# Patient Record
Sex: FEMALE | Race: WHITE | NOT HISPANIC OR LATINO | URBAN - METROPOLITAN AREA
[De-identification: names, ages, dates, MRNs, and addresses within clinical notes are randomized per-mention and may not be internally consistent; named-entity substitution may affect disease eponyms.]

---

## 2023-03-16 ENCOUNTER — INPATIENT (INPATIENT)
Facility: HOSPITAL | Age: 48
LOS: 5 days | Discharge: HOME | DRG: 315 | End: 2023-03-22
Attending: INTERNAL MEDICINE | Admitting: INTERNAL MEDICINE
Payer: COMMERCIAL

## 2023-03-16 ENCOUNTER — APPOINTMENT (OUTPATIENT)
Dept: HEART AND VASCULAR | Facility: CLINIC | Age: 48
End: 2023-03-16
Payer: COMMERCIAL

## 2023-03-16 ENCOUNTER — NON-APPOINTMENT (OUTPATIENT)
Age: 48
End: 2023-03-16

## 2023-03-16 VITALS
OXYGEN SATURATION: 96 % | DIASTOLIC BLOOD PRESSURE: 71 MMHG | TEMPERATURE: 99 F | HEIGHT: 62 IN | WEIGHT: 270.07 LBS | RESPIRATION RATE: 18 BRPM | HEART RATE: 68 BPM | SYSTOLIC BLOOD PRESSURE: 125 MMHG

## 2023-03-16 VITALS
OXYGEN SATURATION: 99 % | BODY MASS INDEX: 50.12 KG/M2 | HEIGHT: 62 IN | DIASTOLIC BLOOD PRESSURE: 87 MMHG | HEART RATE: 81 BPM | SYSTOLIC BLOOD PRESSURE: 154 MMHG

## 2023-03-16 VITALS — WEIGHT: 274 LBS

## 2023-03-16 DIAGNOSIS — Z98.891 HISTORY OF UTERINE SCAR FROM PREVIOUS SURGERY: Chronic | ICD-10-CM

## 2023-03-16 DIAGNOSIS — Z98.890 OTHER SPECIFIED POSTPROCEDURAL STATES: Chronic | ICD-10-CM

## 2023-03-16 DIAGNOSIS — I44.30 UNSPECIFIED ATRIOVENTRICULAR BLOCK: ICD-10-CM

## 2023-03-16 PROBLEM — Z00.00 ENCOUNTER FOR PREVENTIVE HEALTH EXAMINATION: Status: ACTIVE | Noted: 2023-03-16

## 2023-03-16 LAB
ANION GAP SERPL CALC-SCNC: 13 MMOL/L — SIGNIFICANT CHANGE UP (ref 5–17)
APTT BLD: 30.8 SEC — SIGNIFICANT CHANGE UP (ref 27.5–35.5)
BASOPHILS # BLD AUTO: 0.03 K/UL — SIGNIFICANT CHANGE UP (ref 0–0.2)
BASOPHILS NFR BLD AUTO: 0.3 % — SIGNIFICANT CHANGE UP (ref 0–2)
BUN SERPL-MCNC: 8 MG/DL — SIGNIFICANT CHANGE UP (ref 7–23)
CALCIUM SERPL-MCNC: 9.6 MG/DL — SIGNIFICANT CHANGE UP (ref 8.4–10.5)
CHLORIDE SERPL-SCNC: 98 MMOL/L — SIGNIFICANT CHANGE UP (ref 96–108)
CO2 SERPL-SCNC: 25 MMOL/L — SIGNIFICANT CHANGE UP (ref 22–31)
CREAT SERPL-MCNC: 0.52 MG/DL — SIGNIFICANT CHANGE UP (ref 0.5–1.3)
CRP SERPL-MCNC: 34.5 MG/L — HIGH (ref 0–4)
EGFR: 115 ML/MIN/1.73M2 — SIGNIFICANT CHANGE UP
EOSINOPHIL # BLD AUTO: 0.04 K/UL — SIGNIFICANT CHANGE UP (ref 0–0.5)
EOSINOPHIL NFR BLD AUTO: 0.3 % — SIGNIFICANT CHANGE UP (ref 0–6)
ERYTHROCYTE [SEDIMENTATION RATE] IN BLOOD: 48 MM/HR — HIGH
GLUCOSE SERPL-MCNC: 234 MG/DL — HIGH (ref 70–99)
HCG SERPL-ACNC: <0 MIU/ML — SIGNIFICANT CHANGE UP
HCT VFR BLD CALC: 38.2 % — SIGNIFICANT CHANGE UP (ref 34.5–45)
HGB BLD-MCNC: 12.4 G/DL — SIGNIFICANT CHANGE UP (ref 11.5–15.5)
IMM GRANULOCYTES NFR BLD AUTO: 0.3 % — SIGNIFICANT CHANGE UP (ref 0–0.9)
INR BLD: 1.12 — SIGNIFICANT CHANGE UP (ref 0.88–1.16)
LYMPHOCYTES # BLD AUTO: 2.38 K/UL — SIGNIFICANT CHANGE UP (ref 1–3.3)
LYMPHOCYTES # BLD AUTO: 20.5 % — SIGNIFICANT CHANGE UP (ref 13–44)
MAGNESIUM SERPL-MCNC: 1.8 MG/DL — SIGNIFICANT CHANGE UP (ref 1.6–2.6)
MCHC RBC-ENTMCNC: 29.9 PG — SIGNIFICANT CHANGE UP (ref 27–34)
MCHC RBC-ENTMCNC: 32.5 GM/DL — SIGNIFICANT CHANGE UP (ref 32–36)
MCV RBC AUTO: 92 FL — SIGNIFICANT CHANGE UP (ref 80–100)
MONOCYTES # BLD AUTO: 0.62 K/UL — SIGNIFICANT CHANGE UP (ref 0–0.9)
MONOCYTES NFR BLD AUTO: 5.3 % — SIGNIFICANT CHANGE UP (ref 2–14)
NEUTROPHILS # BLD AUTO: 8.49 K/UL — HIGH (ref 1.8–7.4)
NEUTROPHILS NFR BLD AUTO: 73.3 % — SIGNIFICANT CHANGE UP (ref 43–77)
NRBC # BLD: 0 /100 WBCS — SIGNIFICANT CHANGE UP (ref 0–0)
NT-PROBNP SERPL-SCNC: 2421 PG/ML — HIGH (ref 0–300)
PLATELET # BLD AUTO: 400 K/UL — SIGNIFICANT CHANGE UP (ref 150–400)
POTASSIUM SERPL-MCNC: 4.1 MMOL/L — SIGNIFICANT CHANGE UP (ref 3.5–5.3)
POTASSIUM SERPL-SCNC: 4.1 MMOL/L — SIGNIFICANT CHANGE UP (ref 3.5–5.3)
PROTHROM AB SERPL-ACNC: 13.3 SEC — SIGNIFICANT CHANGE UP (ref 10.5–13.4)
RBC # BLD: 4.15 M/UL — SIGNIFICANT CHANGE UP (ref 3.8–5.2)
RBC # FLD: 16.4 % — HIGH (ref 10.3–14.5)
SARS-COV-2 RNA SPEC QL NAA+PROBE: NEGATIVE — SIGNIFICANT CHANGE UP
SODIUM SERPL-SCNC: 136 MMOL/L — SIGNIFICANT CHANGE UP (ref 135–145)
TROPONIN T SERPL-MCNC: 0.01 NG/ML — SIGNIFICANT CHANGE UP (ref 0–0.01)
TSH SERPL-MCNC: 7.15 UIU/ML — HIGH (ref 0.27–4.2)
WBC # BLD: 11.6 K/UL — HIGH (ref 3.8–10.5)
WBC # FLD AUTO: 11.6 K/UL — HIGH (ref 3.8–10.5)

## 2023-03-16 PROCEDURE — 71045 X-RAY EXAM CHEST 1 VIEW: CPT | Mod: 26

## 2023-03-16 PROCEDURE — 99212 OFFICE O/P EST SF 10 MIN: CPT | Mod: 25

## 2023-03-16 PROCEDURE — 93000 ELECTROCARDIOGRAM COMPLETE: CPT

## 2023-03-16 PROCEDURE — 99291 CRITICAL CARE FIRST HOUR: CPT

## 2023-03-16 RX ORDER — FUROSEMIDE 40 MG
20 TABLET ORAL ONCE
Refills: 0 | Status: COMPLETED | OUTPATIENT
Start: 2023-03-16 | End: 2023-03-16

## 2023-03-16 RX ORDER — LOSARTAN POTASSIUM 100 MG/1
25 TABLET, FILM COATED ORAL ONCE
Refills: 0 | Status: DISCONTINUED | OUTPATIENT
Start: 2023-03-16 | End: 2023-03-17

## 2023-03-16 RX ORDER — MAGNESIUM OXIDE 400 MG ORAL TABLET 241.3 MG
400 TABLET ORAL ONCE
Refills: 0 | Status: COMPLETED | OUTPATIENT
Start: 2023-03-16 | End: 2023-03-16

## 2023-03-16 RX ORDER — LOSARTAN POTASSIUM 100 MG/1
25 TABLET, FILM COATED ORAL EVERY 24 HOURS
Refills: 0 | Status: DISCONTINUED | OUTPATIENT
Start: 2023-03-17 | End: 2023-03-17

## 2023-03-16 RX ORDER — INSULIN LISPRO 100/ML
VIAL (ML) SUBCUTANEOUS
Refills: 0 | Status: DISCONTINUED | OUTPATIENT
Start: 2023-03-16 | End: 2023-03-22

## 2023-03-16 RX ADMIN — MAGNESIUM OXIDE 400 MG ORAL TABLET 400 MILLIGRAM(S): 241.3 TABLET ORAL at 19:00

## 2023-03-16 RX ADMIN — Medication 20 MILLIGRAM(S): at 22:12

## 2023-03-16 NOTE — H&P ADULT - HISTORY OF PRESENT ILLNESS
46 y/o F, with no significant PMH, who presented to [facility] ED on 3/16/23 from Dr. Rushing's office who did an EKG that shows a high degree AV block and LBBB. Patient states that she was in her usual state of health until about 2 weeks ago, where she had difficulty breathing with ambulation. Patient also w/ subjective fever, weakness, and light cough; however have all resolved.  Patient went to urgent care 1 night ago and had an EKG that showed high degree AV block, which prompted her to see her cardiologist today. Patient feels well overall. Patient denies CP, dizziness, headache, lightheadedness, syncope, fever, chills, malaise, or other symptoms.     In the ED, VS: T: 99F, BP: 125/71 mmHg, HR: 68 bpm, spO2: 96% on RA  Labs significant: WBC: 11.60, BNP: 2421, COVID-19: Negative; CXR: High degree AVB, LBBB @    CXR: Cardiomegaly. Question mild pulmonary edema. Hazy opacification of the peripheral lung bases, most likely due to overlying soft tissue, although   early consolidation cannot be excluded. EKG: High grade AV block, LBBB, rates in 66 bpm   Patient admitted to cardiology/telemetry for further workup of high-grade AV block.

## 2023-03-16 NOTE — H&P ADULT - ASSESSMENT
48 y/o F, with no significant PMH, who presented to [facility] ED on 3/16/23 from Dr. Rushing's office who did an EKG that shows a high degree AV block and LBBB. Patient states that she was in her usual state of health until about 2 weeks ago, where she had difficulty breathing with ambulation. Patient also w/ subjective fever, weakness, and light cough; however have all resolved.  in the ED, patient w/ LBBB and high gradeAV block on EKG. Seen by EP who recommended cards admission for cardiac MRI/echo and further monitoring.

## 2023-03-16 NOTE — PHYSICAL EXAM
[Normal] : alert and oriented, normal memory [de-identified] : Heavyset [de-identified] : tachy w extra systoles

## 2023-03-16 NOTE — PROGRESS NOTE ADULT - SUBJECTIVE AND OBJECTIVE BOX
*TRANSFER from [facility] to [facility] 5E*  48 y/o F, with no significant PMH, who presented to [facility] ED on 3/16/23 from Dr. Rushing's office who did an EKG that shows a high degree AV block and LBBB. Patient states that she was in her usual state of health until about 2 weeks ago, where she had difficulty breathing with ambulation. Patient also w/ subjective fever, weakness, and light cough; however have all resolved.  Patient went to urgent care 1 night ago and had an EKG that showed high degree AV block, which prompted her to see her cardiologist the day of presentation . Patient feels well overall. Patient denies CP, dizziness, headache, lightheadedness, syncope, fever, chills, malaise, or other symptoms. The patient is admitted to CCU for monitoring of AV block w implementation of pacer pads with continuation of conservative treatment.     ALISHA MIDDLETON 47y Female  MRN#: [id number]     SUBJECTIVE  Currently admitted to medicine with the primary diagnosis of symptomatic AV heart block  Today the patient continues to feel well overall. They deny any CP, headache, lightheadedness, dizziness, syncope, or subjective fever. She reports improved SOB.      OBJECTIVE  PAST MEDICAL & SURGICAL HISTORY  S/P     S/P hernia repair    Home Meds:    ALLERGIES:  No Known Allergies    MEDICATIONS:  STANDING MEDICATIONS    PRN MEDICATIONS    VITAL SIGNS: Last 24 Hours  T(C): 37.1 (16 Mar 2023 18:15), Max: 37.2 (16 Mar 2023 12:40)  T(F): 98.8 (16 Mar 2023 18:15), Max: 99 (16 Mar 2023 12:40)  HR: 56 (16 Mar 2023 19:58) (56 - 79)  BP: 162/74 (16 Mar 2023 19:58) (107/73 - 162/74)  BP(mean): 106 (16 Mar 2023 19:58) (89 - 106)  RR: 22 (16 Mar 2023 19:58) (17 - 22)  SpO2: 97% (16 Mar 2023 19:58) (95% - 98%)    LABS:                        12.4   11.60 )-----------( 400      ( 16 Mar 2023 13:04 )             38.2     03-16    136  |  98  |  8   ----------------------------<  234<H>  4.1   |  25  |  0.52    Ca    9.6      16 Mar 2023 13:04  Mg     1.8     03-16      PT/INR - ( 16 Mar 2023 13:04 )   PT: 13.3 sec;   INR: 1.12          PTT - ( 16 Mar 2023 13:04 )  PTT:30.8 sec      Troponin T, Serum: 0.01 ng/mL (23 @ 13:04)  Sedimentation Rate, Erythrocyte: 48 mm/Hr *H* (23 @ 13:04)      CARDIAC MARKERS ( 16 Mar 2023 13:04 )  x     / 0.01 ng/mL / x     / x     / x          PHYSICAL EXAM:  General: NAD, AAOx3  HEENT: atraumatic, normocephalic  Pulmonary: + rales in the BL lung fields, No wheeze, no increased WOB on exam.   Cardiovascular: Regular rate and rhythm; no murmurs, rubs or gallops. Normal S1S2  Gastrointestinal: Soft, nontender, nondistended; bowel sounds present  Musculoskeletal: 2+ peripheral pulses, no clubbing, cyanosis or edema  Neurology: Pt. alert and oriented, fluent speech, able to move all extremities  Skin: no rashes or lesions         *TRANSFER from [facility] to [facility] 5E*    The patient is a 48 y/o F, with no significant PMH, who presented to [facility] ED on 3/16/23 from Dr. Rushing's office s/p EKG showing high degree AV block w concurrent LBBB. Patient states that she was in her usual state of health until about 2 weeks ago, where she had difficulty breathing, particularly w ambulation w concurrent subjective fever, weakness, and light cough; however have all resolved upon presentation. On presentation, the patient reported feeling well overall without any CP, dizziness, headache, lightheadedness, syncope, fever, chills, malaise, or other symptoms. In the ED the patient presented with stable vitals with CXR notable for cardiomegaly (BNP >2K) w hazy opacification of the peripheral lung bases. Patient is admitted to CCU s/p implementation of pacer pads with management and workup of high-grade AV block (w LBBB).   ALISHA MIDDLETON 47y Female  MRN#: [id number]     SUBJECTIVE  Currently admitted to medicine with the primary diagnosis of symptomatic AV heart block  Today the patient continues to feel well overall. They deny any CP, headache, lightheadedness, dizziness, syncope, or subjective fever. She reports improved SOB.      OBJECTIVE  PAST MEDICAL & SURGICAL HISTORY  S/P     S/P hernia repair    Home Meds:    ALLERGIES:  No Known Allergies    MEDICATIONS:  STANDING MEDICATIONS    PRN MEDICATIONS    VITAL SIGNS: Last 24 Hours  T(C): 37.1 (16 Mar 2023 18:15), Max: 37.2 (16 Mar 2023 12:40)  T(F): 98.8 (16 Mar 2023 18:15), Max: 99 (16 Mar 2023 12:40)  HR: 56 (16 Mar 2023 19:58) (56 - 79)  BP: 162/74 (16 Mar 2023 19:58) (107/73 - 162/74)  BP(mean): 106 (16 Mar 2023 19:58) (89 - 106)  RR: 22 (16 Mar 2023 19:58) (17 - 22)  SpO2: 97% (16 Mar 2023 19:58) (95% - 98%)    LABS:                        12.4   11.60 )-----------( 400      ( 16 Mar 2023 13:04 )             38.2     03-16    136  |  98  |  8   ----------------------------<  234<H>  4.1   |  25  |  0.52    Ca    9.6      16 Mar 2023 13:04  Mg     1.8           PT/INR - ( 16 Mar 2023 13:04 )   PT: 13.3 sec;   INR: 1.12          PTT - ( 16 Mar 2023 13:04 )  PTT:30.8 sec      Troponin T, Serum: 0.01 ng/mL (23 @ 13:04)  Sedimentation Rate, Erythrocyte: 48 mm/Hr *H* (23 @ 13:04)      CARDIAC MARKERS ( 16 Mar 2023 13:04 )  x     / 0.01 ng/mL / x     / x     / x          PHYSICAL EXAM:  General: NAD, AAOx3  HEENT: atraumatic, normocephalic  Pulmonary: + rales in the BL lung fields, No wheeze, no increased WOB on exam.   Cardiovascular: Regular rate and rhythm; no murmurs, rubs or gallops. Normal S1S2  Gastrointestinal: Soft, nontender, nondistended; bowel sounds present  Musculoskeletal: 2+ peripheral pulses, no clubbing, cyanosis or edema  Neurology: Pt. alert and oriented, fluent speech, able to move all extremities  Skin: no rashes or lesions         *TRANSFER from [facility] to [facility] 5E*    The patient is a 48 y/o F, with no significant PMH, who presented to [facility] ED on 3/16/23 from Dr. Rushing's office s/p EKG showing high degree AV block w concurrent LBBB. Patient states that she was in her usual state of health until about 2 weeks ago, where she had difficulty breathing, particularly w ambulation w concurrent subjective fever, weakness, and light cough; however have all resolved upon presentation. On presentation, the patient reported feeling well overall without any CP, dizziness, headache, lightheadedness, syncope, fever, chills, malaise, or other symptoms. In the ED the patient presented with stable vitals with CXR notable for cardiomegaly (BNP >2K) w hazy opacification of the peripheral lung bases. Patient is admitted to CCU s/p implementation of pacer pads with management and workup of high-grade AV block (w LBBB).     ALISHA MIDDLETON 47y Female  MRN#: [id number]     SUBJECTIVE  Currently admitted to medicine with the primary diagnosis of symptomatic AV heart block  Today the patient continues to feel well overall. They deny any CP, headache, lightheadedness, dizziness, syncope, or subjective fever. She reports improved SOB.      OBJECTIVE  PAST MEDICAL & SURGICAL HISTORY  S/P     S/P hernia repair    Home Meds:    ALLERGIES:  No Known Allergies    MEDICATIONS:  STANDING MEDICATIONS    PRN MEDICATIONS    VITAL SIGNS: Last 24 Hours  T(C): 37.1 (16 Mar 2023 18:15), Max: 37.2 (16 Mar 2023 12:40)  T(F): 98.8 (16 Mar 2023 18:15), Max: 99 (16 Mar 2023 12:40)  HR: 56 (16 Mar 2023 19:58) (56 - 79)  BP: 162/74 (16 Mar 2023 19:58) (107/73 - 162/74)  BP(mean): 106 (16 Mar 2023 19:58) (89 - 106)  RR: 22 (16 Mar 2023 19:58) (17 - 22)  SpO2: 97% (16 Mar 2023 19:58) (95% - 98%)    LABS:                        12.4   11.60 )-----------( 400      ( 16 Mar 2023 13:04 )             38.2     03-16    136  |  98  |  8   ----------------------------<  234<H>  4.1   |  25  |  0.52    Ca    9.6      16 Mar 2023 13:04  Mg     1.8           PT/INR - ( 16 Mar 2023 13:04 )   PT: 13.3 sec;   INR: 1.12          PTT - ( 16 Mar 2023 13:04 )  PTT:30.8 sec      Troponin T, Serum: 0.01 ng/mL (23 @ 13:04)  Sedimentation Rate, Erythrocyte: 48 mm/Hr *H* (23 @ 13:04)      CARDIAC MARKERS ( 16 Mar 2023 13:04 )  x     / 0.01 ng/mL / x     / x     / x          PHYSICAL EXAM:  General: NAD, AAOx3  HEENT: atraumatic, normocephalic  Pulmonary: + rales in the BL lung fields, No wheeze, no increased WOB on exam.   Cardiovascular: Regular rate and rhythm; no murmurs, rubs or gallops. Normal S1S2  Gastrointestinal: Soft, nontender, nondistended; bowel sounds present  Musculoskeletal: 2+ peripheral pulses, no clubbing, cyanosis or edema  Neurology: Pt. alert and oriented, fluent speech, able to move all extremities  Skin: no rashes or lesions         *TRANSFER from [facility] to [facility] 5E*    The patient is a 48 y/o F, with no significant PMH, who presented to [facility] ED on 3/16/23 from Dr. Rushing's office s/p EKG showing high degree AV block w concurrent LBBB. Patient states that she was in her usual state of health until about 2 weeks ago, where she had difficulty breathing, particularly w ambulation w concurrent subjective fever, weakness, and light cough; however have all resolved upon presentation. On presentation, the patient reported feeling well overall without any CP, dizziness, headache, lightheadedness, syncope, fever, chills, malaise, or other symptoms. In the ED the patient presented with stable vitals with CXR notable for cardiomegaly (BNP >2K) w hazy opacification of the peripheral lung bases. Patient is admitted to CCU s/p implementation of pacer pads with management and workup of high-grade AV block (w LBBB).     ALISHA MIDDLETON 47y Female  MRN#: [id number]     SUBJECTIVE  Currently admitted to medicine with the primary diagnosis of symptomatic AV heart block  Today the patient continues to feel well overall. They deny any CP, headache, lightheadedness, dizziness, syncope, or subjective fever. She reports improved SOB.      OBJECTIVE  PAST MEDICAL & SURGICAL HISTORY  S/P     S/P hernia repair    Home Meds:    ALLERGIES:  No Known Allergies    MEDICATIONS:  STANDING MEDICATIONS    PRN MEDICATIONS    VITAL SIGNS: Last 24 Hours  T(C): 37.1 (16 Mar 2023 18:15), Max: 37.2 (16 Mar 2023 12:40)  T(F): 98.8 (16 Mar 2023 18:15), Max: 99 (16 Mar 2023 12:40)  HR: 56 (16 Mar 2023 19:58) (56 - 79)  BP: 162/74 (16 Mar 2023 19:58) (107/73 - 162/74)  BP(mean): 106 (16 Mar 2023 19:58) (89 - 106)  RR: 22 (16 Mar 2023 19:58) (17 - 22)  SpO2: 97% (16 Mar 2023 19:58) (95% - 98%)    LABS:                        12.4   11.60 )-----------( 400      ( 16 Mar 2023 13:04 )             38.2     03-16    136  |  98  |  8   ----------------------------<  234<H>  4.1   |  25  |  0.52    Ca    9.6      16 Mar 2023 13:04  Mg     1.8           PT/INR - ( 16 Mar 2023 13:04 )   PT: 13.3 sec;   INR: 1.12          PTT - ( 16 Mar 2023 13:04 )  PTT:30.8 sec      Troponin T, Serum: 0.01 ng/mL (23 @ 13:04)  Sedimentation Rate, Erythrocyte: 48 mm/Hr *H* (23 @ 13:04)      CARDIAC MARKERS ( 16 Mar 2023 13:04 )  x     / 0.01 ng/mL / x     / x     / x          PHYSICAL EXAM:  General: NAD, AAOx3  HEENT: atraumatic, normocephalic  Pulmonary: + rales in the BL lung fields, No wheeze, no increased WOB on exam.   Cardiovascular: Regular rate and rhythm; no murmurs, rubs or gallops. Normal S1S2  Gastrointestinal: Soft, nontender, nondistended; bowel sounds present  Musculoskeletal: 2+ peripheral pulses, no clubbing, +1 pitting edema B/L LE   Neurology: Pt. alert and oriented, fluent speech, able to move all extremities  Skin: no rashes or lesions         *TRANSFER from [facility] to [facility] 5E*  Hospital Course  The patient is a 48 y/o F, with no significant PMH, who presented to [facility] ED on 3/16/23 from Dr. Rushing's office s/p EKG showing high degree AV block w concurrent LBBB. Patient states that she was in her usual state of health until about 2 weeks ago, where she had difficulty breathing, particularly w ambulation w concurrent subjective fever, generalized weakness, and a light cough (w/o sputum production); however have all resolved upon presentation. On presentation, the patient reported feeling well overall without any CP, dizziness, headache, lightheadedness, syncope, fever, chills, malaise, or other symptoms. In the ED the patient presented with stable vitals with CXR notable for cardiomegaly (BNP >2K) w hazy opacification of the peripheral lung bases. Patient is admitted to CCU s/p implementation of pacer pads with management and workup of high-grade AV block (w LBBB).     ALISHA MIDDLETON 47y Female  MRN#: [id number]     SUBJECTIVE  Currently admitted to medicine with the primary diagnosis of symptomatic AV heart block  Today the patient continues to feel well overall. They deny any CP, headache, lightheadedness, dizziness, syncope, or subjective fever. She reports improved SOB.      OBJECTIVE  PAST MEDICAL & SURGICAL HISTORY  S/P     S/P hernia repair    Home Meds:    ALLERGIES:  No Known Allergies    MEDICATIONS:  STANDING MEDICATIONS    PRN MEDICATIONS    VITAL SIGNS: Last 24 Hours  T(C): 37.1 (16 Mar 2023 18:15), Max: 37.2 (16 Mar 2023 12:40)  T(F): 98.8 (16 Mar 2023 18:15), Max: 99 (16 Mar 2023 12:40)  HR: 56 (16 Mar 2023 19:58) (56 - 79)  BP: 162/74 (16 Mar 2023 19:58) (107/73 - 162/74)  BP(mean): 106 (16 Mar 2023 19:58) (89 - 106)  RR: 22 (16 Mar 2023 19:58) (17 - 22)  SpO2: 97% (16 Mar 2023 19:58) (95% - 98%)    LABS:                        12.4   11.60 )-----------( 400      ( 16 Mar 2023 13:04 )             38.2     03-    136  |  98  |  8   ----------------------------<  234<H>  4.1   |  25  |  0.52    Ca    9.6      16 Mar 2023 13:04  Mg     1.8     03-16      PT/INR - ( 16 Mar 2023 13:04 )   PT: 13.3 sec;   INR: 1.12          PTT - ( 16 Mar 2023 13:04 )  PTT:30.8 sec      Troponin T, Serum: 0.01 ng/mL (23 @ 13:04)  Sedimentation Rate, Erythrocyte: 48 mm/Hr *H* (23 @ 13:04)      CARDIAC MARKERS ( 16 Mar 2023 13:04 )  x     / 0.01 ng/mL / x     / x     / x          PHYSICAL EXAM:  General: NAD, AAOx3  HEENT: atraumatic, normocephalic  Pulmonary: + rales in the BL lung fields, No wheeze, no increased WOB on exam.   Cardiovascular: Regular rate and rhythm; no murmurs, rubs or gallops. Normal S1S2  Gastrointestinal: Soft, nontender, nondistended; bowel sounds present  Musculoskeletal: 2+ peripheral pulses, no clubbing, +1 pitting edema B/L LE   Neurology: Pt. alert and oriented, fluent speech, able to move all extremities  Skin: no rashes or lesions         *TRANSFER from [facility] to [facility] 5E*  Hospital Course  The patient is a 46 y/o F, with no significant PMH, who presented to [facility] ED on 3/16/23 from Dr. Rushing's office s/p EKG showing high degree AV block w concurrent LBBB. Patient states that she was in her usual state of health until about 2 weeks ago, where she had difficulty breathing, particularly w ambulation w concurrent subjective fever, generalized weakness, and a light cough (w/o sputum production); however have all resolved upon presentation. On presentation, the patient reported feeling well overall without any CP, dizziness, headache, lightheadedness, syncope, fever, chills, malaise, or other symptoms. In the ED the patient presented with stable vitals with CXR notable for cardiomegaly (BNP >2K) w hazy opacification of the peripheral lung bases. Patient is admitted to CCU s/p implementation of pacer pads with management and workup of high-grade AV block (w LBBB).     In the ED, VS: T: 99F, BP: 125/71 mmHg, HR: 68 bpm, spO2: 96% on RA  Labs significant: WBC: 11.60, BNP: 2421, COVID-19: Negative; CXR: High degree AVB, LBBB @    CXR: Cardiomegaly. Question mild pulmonary edema. Hazy opacification of the peripheral lung bases, most likely due to overlying soft tissue, although   early consolidation cannot be excluded. EKG: High grade AV block, LBBB, rates in 66 bpm   Patient admitted to cardiology/telemetry for further workup of high-grade AV block.     ALISHA MIDDLETON 47y Female  MRN#: [id number]     SUBJECTIVE  Currently admitted to medicine with the primary diagnosis of symptomatic AV heart block  Today the patient continues to feel well overall. They deny any CP, headache, lightheadedness, dizziness, syncope, or subjective fever. She reports improved SOB.      OBJECTIVE  PAST MEDICAL & SURGICAL HISTORY  S/P     S/P hernia repair    Home Meds:    ALLERGIES:  No Known Allergies    MEDICATIONS:  STANDING MEDICATIONS    PRN MEDICATIONS    VITAL SIGNS: Last 24 Hours  T(C): 37.1 (16 Mar 2023 18:15), Max: 37.2 (16 Mar 2023 12:40)  T(F): 98.8 (16 Mar 2023 18:15), Max: 99 (16 Mar 2023 12:40)  HR: 56 (16 Mar 2023 19:58) (56 - 79)  BP: 162/74 (16 Mar 2023 19:58) (107/73 - 162/74)  BP(mean): 106 (16 Mar 2023 19:58) (89 - 106)  RR: 22 (16 Mar 2023 19:58) (17 - 22)  SpO2: 97% (16 Mar 2023 19:58) (95% - 98%)    LABS:                        12.4   11.60 )-----------( 400      ( 16 Mar 2023 13:04 )             38.2     03-16    136  |  98  |  8   ----------------------------<  234<H>  4.1   |  25  |  0.52    Ca    9.6      16 Mar 2023 13:04  Mg     1.8     03-16      PT/INR - ( 16 Mar 2023 13:04 )   PT: 13.3 sec;   INR: 1.12          PTT - ( 16 Mar 2023 13:04 )  PTT:30.8 sec      Troponin T, Serum: 0.01 ng/mL (23 @ 13:04)  Sedimentation Rate, Erythrocyte: 48 mm/Hr *H* (23 @ 13:04)      CARDIAC MARKERS ( 16 Mar 2023 13:04 )  x     / 0.01 ng/mL / x     / x     / x          PHYSICAL EXAM:  General: NAD, AAOx3  HEENT: atraumatic, normocephalic  Pulmonary: + rales in the BL lung fields, No wheeze, no increased WOB on exam.   Cardiovascular: Regular rate and rhythm; no murmurs, rubs or gallops. Normal S1S2  Gastrointestinal: Soft, nontender, nondistended; bowel sounds present  Musculoskeletal: 2+ peripheral pulses, no clubbing, +1 pitting edema B/L LE   Neurology: Pt. alert and oriented, fluent speech, able to move all extremities  Skin: no rashes or lesions

## 2023-03-16 NOTE — PATIENT PROFILE ADULT - FALL HARM RISK - HARM RISK INTERVENTIONS

## 2023-03-16 NOTE — ASSESSMENT
[FreeTextEntry1] : EKG 3/15/2022 (outside, canned into [employer]): NSR, high degree AV block, wide LBBB (QRS approx 170 msec)\par EKG 3/16, atrial tachycardia, PVCs, LBBB\par \par A.P\par 1. Abnormal EKG\par 2. LBBB\par 3. High degree AV block\par Hx as above\par Fairly acute symptom onset, with objective and evolving EKG changes as above w transient high degree AV block. On exam now, hemodynamically stable, euvolemic.\par Plan\par - refer  ED (ED notified)\par - EP consult (Dr Marie aware)\par - echo in ED\par Further w/u pending echo findings

## 2023-03-16 NOTE — H&P ADULT - ATTENDING COMMENTS
46 yo woman  with no significant PMH or issues during previous pregnancies. Presented with a 2wk URI viral prodrome after contact with sick children followed by insidious onset of PEREIRA, orthopnea and LE edema. Came to see a cardiologist in the office, found to be bradycardic.    ECG showed high-degree AVB  CXR showed mild pulmonary edema   Labs with no major electrolyte abnormalities and no anemia - pBNP~2k, CRP~34   TTE shows global LV dysfunction with LVEF~30%, normal cavity size and normal RV  Covid swab negative     No VT and hemodynamically stable.     Suspect acute myocarditis with high risk features: acute heart failure and high degree AVB. Suspect viral/lymphocytic based on history but cannot exclude GCM.    - Offered EMB after long discussion with her and  - they declined. Reinforced that if she worsens we would like to do it  - Lyme titers (low likelihood)  - Improved substantially with one dose of Lasix, now stable   - Expectant management for now - If she does not worsen will slowly introduce GDMT for HFrEF    Ben Cronin MD

## 2023-03-16 NOTE — H&P ADULT - CONSTITUTIONAL COMMENTS
Spoke with Dr Je Wu to reorder inhaler as ordered previously. Pt needs b12 and folate checked. Bactrim daily as pervious.
NAD, sitting comfortably in bed

## 2023-03-16 NOTE — H&P ADULT - PROBLEM SELECTOR PLAN 1
EKG w/ high grade AV block and LBBB (new)  - States hx of viral-like symptoms - cough, fever, PEREIRA 2 weeks ago   - Troponin: Negative x 1  - CXR w/ questionable cardiopulmonary edema   - ECHO ordered  - Cardiac MRI ordered/ ? if patient has reaction from contrast (give IV solumedrol prior to MRI)  - Ordered ESR/CRP; lyme titers; thyroid studies, lipid, a1c  - EP to follow patient   - Pacer pads on, atropine at bedside, telemetry monitoring     F: none   E: K >4, MG > 2  N: DASH, TLC  Dvt: ambulatory  Dispo: pending EP workup

## 2023-03-16 NOTE — CONSULT NOTE ADULT - ASSESSMENT
46 yo F with no significant past medical history who was referred to [facility] Ed for evaluation of high degree heart block noted on her  ECG and symptoms of SOB, PEREIRA , LE swelling and weight gain.   Patients ECGs show intermitted high degree AVB, LBBB, and Wenckebach on ecg 3/16/23.   Will follow up echo cardiogram, please get cMRI to r/o any infiltrative heart diseases, would also send Lyme titers .  Cardiac telemetry with pacing pads . Will follow

## 2023-03-16 NOTE — HISTORY OF PRESENT ILLNESS
[FreeTextEntry1] : 47 F\par No signif cardiac or medical history - HTN, -DM, - lipids, - tob\par Remote post gestation TFT abnormality resolved without treatment\par Prior routine EKG 3 years ago, definitively normal per pt\par \par In [facility], active, two weeks ago noted PEREIRA during usual walk [place] to [place], accompanied by LE edema and weight gain. No CP. Symptoms on and off over last two weeks but steadily progressing. Saw Urgent Care last night, EKG w high degree AV block. Overnight spontaneous improvement in exertional symptoms w accompanying spontaneous diuresis.\par Denies CP, palpitations, dizziness, lightheadedness or syncope. No constitutional symptoms. travel to [place] 2 mos ago. No vitamins/ health food supplements\par \par No family history cardiac disease

## 2023-03-16 NOTE — ED PROVIDER NOTE - PHYSICAL EXAMINATION
General: Well appearing, in no acute distress  HEENT: Normocephalic, atraumatic, extraocular movements intact  CV: Regular rate  Pulm: No respiratory distress, no tachypnea, CTAB  Abd: Flat, no gross distension  Ext: warm and well perfused, no LE edema  Skin: No gross rashes or lesions  Neuro: Alert and oriented, moving all extremities general: Well appearing, in no acute distress

## 2023-03-16 NOTE — CHART NOTE - NSCHARTNOTEFT_GEN_A_CORE
Limited TTE:    Largely limited by poor body habitus, technically difficult study  Probably normal LV size and function  No significant valvular disease    Official TTE with contrast in the AM  Kyle Marshall PGY4 Cardiology Limited TTE:    Largely limited by poor body habitus, technically difficult study  LV size likely normal but mild to moderate reduced function (EF 35-40%)  No significant valvular disease    Official TTE with contrast in the AM  Kyle Marshall PGY4 Cardiology Limited TTE:    Largely limited by poor body habitus, technically difficult study  LV size likely normal but mild to moderate reduced function (EF ~40%)  No significant valvular disease    Official TTE with contrast in the AM  Kyle Marshall PGY4 Cardiology

## 2023-03-16 NOTE — PROGRESS NOTE ADULT - ASSESSMENT
48 y/o F, with no significant PMH, who presented to [facility] ED on 3/16/23 from Dr. Rushing's office w EKG high degree AV block and LBBB w 2 week hx of SOB and increased WOB w ambulation. In addition had accompanying subjective fever, weakness, and light cough; however have all resolved. Patient found to have LBBB and high grade AV block on EKG w current admission for monitoring of AV block with further assessment via cardiac MRI/echo.       48 y/o F, with no significant PMH, who presented to [facility] ED on 3/16/23 from Dr. Rushing's office w EKG high degree AV block and LBBB w 2 week hx of SOB and increased WOB w ambulation. In addition had accompanying subjective fever, weakness, and light cough; however have all resolved. Patient found to have LBBB and high grade AV block on EKG w current admission for monitoring of AV block with further assessment via cardiac MRI/echo.      NEURO    PULM    CARDIOVASCULAR    ENDO    GI        RENAL    HEME    ID      MISC  F:   E: Replete PRN   N:   DVT ppx:   GI ppx:   Dispo: CCU    LINES  A-line (3/13)  TLC (3/13)  Cordova (3/13)     Extubated 3/15*     46 y/o F, with no significant PMH, who presented to [facility] ED on 3/16/23 from Dr. Rushing's office w EKG high degree AV block and LBBB w 2 week hx of SOB and increased WOB w ambulation. In addition had accompanying subjective fever, weakness, and light cough; however have all resolved. Patient found to have LBBB and high grade AV block on EKG w current admission for monitoring of AV block with further assessment via cardiac MRI/echo.    NEURO  TRA     PULM  on RA   - TRA    CARDIOVASCULAR  #high grade AV block and LBBB  2/2 likely infectious etiology vs ischemia vs infiltrative (viral myocarditis)   Pt states hx of viral-like symptoms, cough, fever, PEREIRA 2 weeks ago. Troponin: Negative x 1. CXR w/ questionable cardiopulmonary edema   - f/u TTE  - f/u cardiac MRI - note if reaction from contrast (give IV solumedrol prior to MRI)   - f/u ESR/CRP, lyme titers, thyroid studies, lipid, a1c   - EP consulted, f/u recs   - Pacer pads on w atropine at bedside + telemetry     ENDO    GI        RENAL    HEME    ID      MISC  F: None   E: Replete PRN   N: DASH, TLC   DVT ppx: ambulatory   Dispo: CCU    LINES  A-line (3/13)  TLC (3/13)  Cordova (3/13)     Extubated 3/15*     48 y/o F, with no significant PMH, who presented to [facility] ED on 3/16/23 from Dr. Rushing's office w EKG high degree AV block and LBBB w 2 week hx of SOB and increased WOB w ambulation. In addition had accompanying subjective fever, weakness, and light cough; however have all resolved. Patient found to have LBBB and high grade AV block on EKG w current admission for monitoring of AV block with further assessment via cardiac MRI/echo.    NEURO  TRA     PULM  on RA   - TRA    CARDIOVASCULAR  #high grade AV block and LBBB  2/2 likely infectious etiology vs ischemia vs infiltrative (viral myocarditis)   Pt states hx of viral-like symptoms, cough, fever, PEREIRA 2 weeks ago. Troponin: Negative x 1. CXR w/ questionable cardiopulmonary edema   - f/u TTE  - f/u cardiac MRI - note if reaction from contrast (give IV solumedrol prior to MRI)   - f/u ESR/CRP, lyme titers, thyroid studies, lipid, a1c   - EP consulted, f/u recs   - Pacer pads on w atropine at bedside + telemetry     ENDO  No known hx DM, however glucose 234   - AM A1c   - Sliding Scale  - On Dash Diet    GI          RENAL    HEME    ID      MISC  F: None   E: Replete PRN   N: DASH, TLC   DVT ppx: ambulatory   Dispo: CCU    LINES  A-line (3/13)  TLC (3/13)  Cordova (3/13)     Extubated 3/15*     46 y/o F, with no significant PMH, who presented to [facility] ED on 3/16/23 from Dr. Rushing's office w EKG high degree AV block and LBBB w 2 week hx of SOB and increased WOB w ambulation. In addition had accompanying subjective fever, weakness, and light cough; however have all resolved. Patient found to have LBBB and high grade AV block on EKG w current admission for monitoring of AV block with further assessment via cardiac MRI/echo.    NEURO  TRA     PULM  on RA   - TRA    CARDIOVASCULAR  #high grade AV block and LBBB  2/2 likely infectious etiology vs ischemia vs infiltrative (viral myocarditis)   Pt states hx of viral-like symptoms, cough, fever, PEREIRA 2 weeks ago. Troponin: Negative x 1. CXR w/ questionable cardiopulmonary edema   - f/u TTE  - f/u cardiac MRI - note if reaction from contrast (give IV solumedrol prior to MRI)   - f/u ESR/CRP, lyme titers, thyroid studies, lipid, a1c   - EP consulted, f/u recs   - Pacer pads on w atropine at bedside + telemetry     ENDO  No known hx DM, however glucose 234   - AM A1c   - Sliding Scale  - On Dash Diet    GI  TRA      TRA    RENAL  TRA    HEME  TRA    ID  TRA    MISC  F: None   E: Replete PRN   N: DASH, TLC   DVT ppx: ambulatory   Dispo: CCU       46 y/o F, with no significant PMH, who presented to [facility] ED on 3/16/23 from Dr. Rushing's office w EKG high degree AV block and LBBB w 2 week hx of SOB and increased WOB w ambulation. In addition had accompanying subjective fever, weakness, and light cough; however have all resolved. Patient found to have LBBB and high grade AV block on EKG w current admission for monitoring of AV block with further assessment via cardiac MRI/echo.    NEURO  TRA     PULM  on RA   TRA    CARDIOVASCULAR  #high grade AV block and LBBB  2/2 likely infectious etiology vs ischemia vs infiltrative (viral myocarditis)   Pt states hx of viral-like symptoms, cough, fever, PEREIRA 2 weeks ago. Troponin: Negative x 1. CXR w/ questionable cardiopulmonary edema   - f/u TTE  - f/u cardiac MRI - note if reaction from contrast (give IV solumedrol prior to MRI)   - f/u ESR/CRP, lyme titers, thyroid studies, lipid, a1c   - EP consulted, f/u recs   - Pacer pads on w atropine at bedside + telemetry     ENDO  No known hx DM, however glucose 234   - AM A1c   - Sliding Scale  - On Dash Diet    GI  TRA      TRA    RENAL  TRA    HEME  TRA    ID  TRA    MISC  F: None   E: Replete PRN   N: DASH, TLC   DVT ppx: ambulatory   Dispo: CCU       Assessment:    48 y/o F, with no significant PMH, who presented to [facility] ED on 3/16/23 from Dr. Rushing's office w EKG high degree AV block and LBBB w 2 week hx of SOB and increased WOB w ambulation. In addition had accompanying subjective fever, weakness, and light cough; however have all resolved. Patient found to have LBBB and high grade AV block on EKG w current admission for monitoring of AV block with further assessment via cardiac MRI/echo.    NEURO  TRA     PULM  TRA    CARDIOVASCULAR  #High grade AV block and LBBB  2/2 likely infectious etiology vs ischemia vs infiltrative (viral myocarditis)   Bedside TTE was poor quality but showed no effusion and likely slightly reduced EF. EKG w mixed wenckebach w 2:1 AV block. S/p 20 IV lasix w good response. Pacer pads on w atropine at bedside + telemetry.   - Lasix 40 IV in AM 3/17  - c/w losartan 25mg OD (pt refused 1st dose, however hypertensive on monitor 147/17)  - f/u TTE  - f/u cardiac MRI - note if reaction from contrast (give IV solumedrol prior to MRI)   - f/u ESR/CRP, lyme titers, thyroid studies, RVP  - EP consulted, f/u recs     ENDO  No known hx DM, however glucose 234   - AM lipid Panel  - AM A1c   - Sliding Scale    GI  TRA      TRA    RENAL  TRA    HEME  TRA    ID  WBC 11.6, however afebrile, WNL VS, likely reactive   - CTM w daily CBCs     MISC  F: None   E: Replete PRN   N: DASH/TLC   DVT ppx: ambulatory   Dispo: CCU       Assessment:    46 y/o F, with no significant PMH, who presented to [facility] ED on 3/16/23 from Dr. Rushing's office w EKG high degree AV block and LBBB w 2 week hx of SOB and increased WOB w ambulation. In addition had accompanying subjective fever, weakness, and light cough; however have all resolved. Patient found to have LBBB and high grade AV block on EKG w current admission for monitoring of AV block with further assessment via cardiac MRI/echo.    NEURO  TRA     PULM  TRA    CARDIOVASCULAR  # 2:1 AV block w LBBB likely 2/2 infectious etiology given history of viral like symptoms (2 wk hx of cough, fever PEREIRA)  vs ischemia vs infiltrative. Bedside TTE was poor quality but showed no effusion and likely slightly reduced EF. EKG w mixed wenckebach w 2:1 AV block. S/p 20 IV lasix w good response. Pacer pads on w atropine at bedside + telemetry.   - Lasix 40 IV in AM 3/17  - c/w losartan 25mg OD (pt refused 1st dose, however hypertensive on monitor 147/17)  - f/u TTE  - f/u cardiac MRI - note if reaction from contrast (give IV solumedrol prior to MRI) to rule out infiltrative disease   - f/u ESR/CRP, lyme titers, thyroid studies, RVP  - EP consulted, f/u recs     ENDO  No known hx DM, however glucose 234   - AM lipid Panel  - AM A1c   - Sliding Scale    GI  TRA      TRA    RENAL  TRA    HEME  TRA    ID  WBC 11.6, however afebrile, WNL VS, likely reactive   - CTM w daily CBCs     MISC  F: None   E: Replete PRN   N: DASH/TLC   DVT ppx: ambulatory   Dispo: CCU       Assessment:    48 y/o F, with no significant PMH, who presented to [facility] ED on 3/16/23 from Dr. Rushing's office w EKG high degree AV block and LBBB w 2 week hx of SOB and increased WOB w ambulation. In addition had accompanying subjective fever, weakness, and light cough; however have all resolved. Patient found to have LBBB and high grade AV block on EKG w current admission for monitoring of AV block with further assessment via cardiac MRI/echo.    NEURO  TRA     PULM  TRA    CARDIOVASCULAR  # 2:1 AV block w LBBB likely 2/2 infectious etiology given history of viral like symptoms (2 wk hx of cough, fever PEREIRA)  vs ischemia vs infiltrative. Bedside TTE was poor quality but showed no effusion and likely slightly reduced EF. EKG w mixed wenckebach w 2:1 AV block. S/p 20 IV lasix w good response. Pacer pads on w atropine at bedside + telemetry.   - Lasix 40 IV in AM 3/17  - c/w losartan 25mg OD (pt refused 1st dose, however hypertensive on monitor 147/17)  - f/u TTE  - f/u cardiac MRI - note if reaction from contrast (give IV solumedrol prior to MRI) to rule out infiltrative disease   - f/u ESR/CRP, lyme titers, thyroid studies, RVP  - EP consulted, f/u recs     ENDO  No known hx DM, however glucose 234   - AM lipid Panel  - AM A1c   - Sliding Scale    GI  TRA    RENAL  TRA    HEME  TRA    ID  WBC 11.6, however afebrile, WNL VS, likely reactive   - CTM w daily CBCs     MISC  F: None   E: Replete PRN   N: DASH/TLC   DVT ppx: ambulatory   Dispo: CCU       Assessment:    46 y/o F, with no significant PMH, who presented to [facility] ED on 3/16/23 from Dr. Rushing's office w EKG high degree AV block and LBBB w 2 week hx of SOB and increased WOB w ambulation. In addition had accompanying subjective fever, weakness, and light cough; however have all resolved. Patient found to have LBBB and high grade AV block on EKG w current admission for monitoring of AV block with further assessment via cardiac MRI/echo.    NEURO  TRA     PULM  TRA    CARDIOVASCULAR  # 2:1 AV block w LBBB likely 2/2 infectious etiology given history of viral like symptoms (2 wk hx of cough, fever PEREIRA)  vs ischemia vs infiltrative. Bedside TTE was poor quality but showed no effusion and likely slightly reduced EF. EKG w mixed wenckebach w 2:1 AV block. S/p 20 IV lasix w good response. Pacer pads on w atropine at bedside + telemetry.   - Lasix 40 IV in AM 3/17  - c/w losartan 25mg OD (pt refused 1st dose, however hypertensive on monitor 147/17)  - f/u TTE  - f/u cardiac MRI - note if reaction from contrast (give IV solumedrol prior to MRI) to rule out infiltrative disease   - f/u ESR/CRP, lyme titers, thyroid studies, RVP  - EP consulted, f/u recs     ENDO  No known hx DM, however glucose 234   - AM lipid Panel  - AM A1c   - Sliding Scale    GI  TRA    RENAL  TRA    HEME  TRA    ID  WBC 11.6, however afebrile, WNL VS, likely reactive   - CTM w daily CBCs     MISC  F: None   E: Replete PRN   N: DASH/TLC   DVT ppx: ambulatory   Dispo: CCU       Assessment:    46 y/o F, with no significant PMH, who presented to [facility] ED on 3/16/23 from Dr. Rushing's office w EKG high degree AV block and LBBB w 2 week hx of SOB and increased WOB w ambulation. In addition had accompanying subjective fever, weakness, and light cough; however have all resolved. Patient found to have LBBB and high grade AV block on EKG w current admission for monitoring of AV block with further assessment via cardiac MRI/echo.    NEURO  TRA     PULM  TRA    CARDIOVASCULAR  # 2:1 AV block w LBBB likely 2/2 infectious etiology given history of viral like symptoms (2 wk hx of cough, fever PEREIRA)  vs ischemia vs infiltrative. Bedside TTE was poor quality but showed no effusion and likely slightly reduced EF. EKG w mixed wenckebach w 2:1 AV block. S/p 20 IV lasix w good response. Pacer pads on w atropine at bedside + telemetry.   - c/w Lasix 40 IV   - c/w losartan 25mg OD (pt refused 1st dose, however hypertensive on monitor 147/17)  - f/u TTE  - f/u cardiac MRI - note if reaction from contrast (give IV solumedrol prior to MRI) to rule out infiltrative disease   - f/u ESR/CRP, lyme titers, thyroid studies, RVP  - EP consulted, f/u recs     ENDO  No known hx DM, however glucose 234   - AM lipid Panel  - AM A1c   - Sliding Scale    GI  TRA    RENAL  TRA    HEME  TRA    ID  WBC 11.6, however afebrile, WNL VS, likely reactive   - CTM w daily CBCs     MISC  F: None   E: Replete PRN   N: DASH/TLC   DVT ppx: ambulatory   Dispo: CCU       Assessment:    48 y/o F, with no significant PMH, who presented to [facility] ED on 3/16/23 from Dr. Rushing's office w EKG high degree AV block and LBBB w 2 week hx of SOB and increased WOB w ambulation. In addition had accompanying subjective fever, weakness, and light cough; however have all resolved. Patient found to have LBBB and high grade AV block on EKG w current admission for monitoring of AV block with further assessment via cardiac MRI/echo.    NEURO  TRA     PULM  TRA    CARDIOVASCULAR  # 2:1 AV block w LBBB likely 2/2 infectious etiology given history of viral like symptoms (2 wk hx of cough, fever PEREIRA)  vs ischemia vs infiltrative. Bedside TTE was poor quality but showed no effusion and likely slightly reduced EF. EKG w mixed wenckebach w 2:1 AV block. S/p 20 IV lasix w good response. Pacer pads on w atropine at bedside + telemetry.   - c/w Lasix 40 IV   - c/w losartan 25mg OD (pt refused 1st dose, however hypertensive on monitor 147/17)  - f/u TTE  - f/u cardiac MRI - note if reaction from contrast (give IV solumedrol prior to MRI) to rule out infiltrative disease   - f/u ESR/CRP, lyme titers, thyroid studies, RVP  - EP consulted, f/u recs     ENDO  No known hx DM, however glucose 234   - AM lipid Panel  - AM A1c   - Sliding Scale    GI  TRA    RENAL  TRA    HEME  TRA    ID  WBC 11.6, however afebrile, WNL VS, likely reactive   - CTM w daily CBCs     MISC  F: None   E: Replete PRN   N: DASH/TLC   DVT ppx: Lovenox  Dispo: CCU

## 2023-03-16 NOTE — ED PROVIDER NOTE - PROGRESS NOTE DETAILS
VADIM NAIR believes patient has high degree AV block, recommends cardiology admission, cardiac MRI to evaluate for PPM/ICD. Does not believe it is complete heart block. Will admit to cards.

## 2023-03-16 NOTE — CONSULT NOTE ADULT - SUBJECTIVE AND OBJECTIVE BOX
Electrophysiology Consult Note:     CHIEF COMPLAINT:  Patient is a 47y old  Female who presents with a chief complaint of       HISTORY OF PRESENT ILLNESS:   48 yo F with no significant past medical history who was referred to [facility] Ed for evaluation of high degree heart block noted on her  ECG.   Patient presented to her cardiologist office yesterday with c/o SOB ,PEREIRA, LE edema and weight gain. Patient states that her symptoms started about 2 weeks ago and were getting progressively worst. Patient denies any chest pain , palpitations, dizziness, syncope. No recent infections, hospitalizations no new medications.   She was advised to go to Ed for management and possible interventions.      PAST MEDICAL & SURGICAL HISTORY:      FAMILY HISTORY:      SOCIAL HISTORY:    [x ] Non-smoker      Allergies    No Known Allergies    Intolerances    	    MEDICATIONS:  MEDICATIONS  (STANDING):    MEDICATIONS  (PRN):        REVIEW OF SYSTEMS:  CONSTITUTIONAL: No fever, weight loss, or fatigue  EYES: No eye pain, visual disturbances, or discharge  ENMT:  No difficulty hearing, tinnitus, vertigo; No sinus or throat pain  NECK: No pain or stiffness  BREASTS: No pain, masses, or nipple discharge  RESPIRATORY: No cough, wheezing, chills or hemoptysis; No Shortness of Breath  CARDIOVASCULAR: No chest pain, palpitations, dizziness, or leg swelling  GASTROINTESTINAL: No abdominal or epigastric pain. No nausea, vomiting, or hematemesis; No diarrhea or constipation. No melena or hematochezia.  GENITOURINARY: No dysuria, frequency, hematuria, or incontinence  NEUROLOGICAL: No headaches, memory loss, loss of strength, numbness, or tremors      PHYSICAL EXAM:  Vital Signs Last 24 Hrs  T(C): 37.2 (16 Mar 2023 12:40), Max: 37.2 (16 Mar 2023 12:40)  T(F): 99 (16 Mar 2023 12:40), Max: 99 (16 Mar 2023 12:40)  HR: 68 (16 Mar 2023 12:40) (68 - 68)  BP: 125/71 (16 Mar 2023 12:40) (125/71 - 125/71)  BP(mean): --  RR: 18 (16 Mar 2023 12:40) (18 - 18)  SpO2: 96% (16 Mar 2023 12:40) (96% - 96%)    Parameters below as of 16 Mar 2023 12:40  Patient On (Oxygen Delivery Method): transtracheal catheter      Daily Height in cm: 157.48 (16 Mar 2023 12:40)    Daily     Constitutional: NAD	  HEENT:  PERRL, EOMI	  CVS: l S1 S2, No JVD, trace  edema  Pulm: Lungs clear to auscultation	  GI:  Soft, Non-tender, + BS	  Ext: trace  LE edema  Neurologic: A&O x 3,  Skin: No rash or lesion       	  LABS:	                         12.4   11.60 )-----------( 400      ( 16 Mar 2023 13:04 )             38.2     03-16    136  |  98  |  8   ----------------------------<  234<H>  4.1   |  25  |  0.52    Ca    9.6      16 Mar 2023 13:04      proBNP:   Lipid Profile:   HgA1c:   TSH: 	        Telemetry:  SR, LBBB, intermittent high degree HB     Echo: PND

## 2023-03-16 NOTE — ED PROVIDER NOTE - OBJECTIVE STATEMENT
47-year-old female with no significant past medical history presenting with 2 weeks of shortness of breath without fever, chills, chest pain, lightheadedness, dizziness.  No recent travel, no history of DVT or PE, no leg pain or leg swelling.  Patient saw her cardiologist Dr. Rushing Who did an EKG that showed a high degree AV block with a left bundle branch block, discussed case with Dr. Kitchen, who advised patient come to ER. Denies sob currently. Feels fine at rest. ROS as above.

## 2023-03-16 NOTE — ED PROVIDER NOTE - NS ED ROS FT
Constitutional: No fever or chills  Eyes: No discharge or drainage  Ears, Nose, Mouth, Throat: No nasal discharge, no sore throat  Cardiovascular: No chest pain, no palpitations  Respiratory: + shortness of breath, no cough  Gastrointestinal: No nausea or vomiting, no abdominal pain, no diarrhea or constipation  Musculoskeletal: No joint pain, no swelling  Skin: No rashes or lesions  Neurological: No numbness, weakness, tingling, no headache

## 2023-03-16 NOTE — ED ADULT NURSE NOTE - OBJECTIVE STATEMENT
Pt. a&ox4 ambulatory with steady gait, sent into ED by her cardiologist d/t abnormal EKG revealing complete HB, with no known hx or prior EKG. Pt. chief complaint of SOB / PEREIRA for last 2 days, when pt. f/u with pcp, sent to cards that she went to this am. Pt. denies cp, palpitations, dizziness, lightheadedness, fatigue, vision changes, recent illness, n/v/d, HA. Large bore IV placed, pt. on ccm, pulseox, and auto BP, placed on ACLS defib / pacing pads and zoll monitor. EKG done and cards paged.   Pt. had one episode on CCM revealing HR of 18bpm. Printed and scanned into chart. Cardiology and MD Brothers made aware. Pt. comfortable and remains asymptomatic.

## 2023-03-16 NOTE — ED PROVIDER NOTE - CLINICAL SUMMARY MEDICAL DECISION MAKING FREE TEXT BOX
47-year-old female presenting with shortness of breath, EKG with left bundle branch block with what appears to be high degree AV block, EP aware, patient otherwise hemodynamically stable. Pacer pads placed. Will check labs to ro electrolyte disturbance, CXR given sob, reassess. Not fluid overloaded on exam.

## 2023-03-17 LAB
A1C WITH ESTIMATED AVERAGE GLUCOSE RESULT: 9.8 % — HIGH (ref 4–5.6)
ALBUMIN SERPL ELPH-MCNC: 3.6 G/DL — SIGNIFICANT CHANGE UP (ref 3.3–5)
ALBUMIN SERPL ELPH-MCNC: 3.8 G/DL — SIGNIFICANT CHANGE UP (ref 3.3–5)
ALP SERPL-CCNC: 70 U/L — SIGNIFICANT CHANGE UP (ref 40–120)
ALP SERPL-CCNC: 75 U/L — SIGNIFICANT CHANGE UP (ref 40–120)
ALT FLD-CCNC: 53 U/L — HIGH (ref 10–45)
ALT FLD-CCNC: 55 U/L — HIGH (ref 10–45)
ANION GAP SERPL CALC-SCNC: 11 MMOL/L — SIGNIFICANT CHANGE UP (ref 5–17)
ANION GAP SERPL CALC-SCNC: 13 MMOL/L — SIGNIFICANT CHANGE UP (ref 5–17)
AST SERPL-CCNC: 37 U/L — SIGNIFICANT CHANGE UP (ref 10–40)
AST SERPL-CCNC: 37 U/L — SIGNIFICANT CHANGE UP (ref 10–40)
B BURGDOR C6 AB SER-ACNC: POSITIVE
B BURGDOR IGG+IGM SER-ACNC: 8.53 INDEX — HIGH (ref 0.01–0.89)
BASOPHILS # BLD AUTO: 0.04 K/UL — SIGNIFICANT CHANGE UP (ref 0–0.2)
BASOPHILS # BLD AUTO: 0.05 K/UL — SIGNIFICANT CHANGE UP (ref 0–0.2)
BASOPHILS NFR BLD AUTO: 0.3 % — SIGNIFICANT CHANGE UP (ref 0–2)
BASOPHILS NFR BLD AUTO: 0.5 % — SIGNIFICANT CHANGE UP (ref 0–2)
BILIRUB SERPL-MCNC: 0.8 MG/DL — SIGNIFICANT CHANGE UP (ref 0.2–1.2)
BILIRUB SERPL-MCNC: 0.9 MG/DL — SIGNIFICANT CHANGE UP (ref 0.2–1.2)
BUN SERPL-MCNC: 12 MG/DL — SIGNIFICANT CHANGE UP (ref 7–23)
BUN SERPL-MCNC: 8 MG/DL — SIGNIFICANT CHANGE UP (ref 7–23)
CALCIUM SERPL-MCNC: 9.4 MG/DL — SIGNIFICANT CHANGE UP (ref 8.4–10.5)
CALCIUM SERPL-MCNC: 9.6 MG/DL — SIGNIFICANT CHANGE UP (ref 8.4–10.5)
CHLORIDE SERPL-SCNC: 100 MMOL/L — SIGNIFICANT CHANGE UP (ref 96–108)
CHLORIDE SERPL-SCNC: 97 MMOL/L — SIGNIFICANT CHANGE UP (ref 96–108)
CHOLEST SERPL-MCNC: 155 MG/DL — SIGNIFICANT CHANGE UP
CO2 SERPL-SCNC: 27 MMOL/L — SIGNIFICANT CHANGE UP (ref 22–31)
CO2 SERPL-SCNC: 27 MMOL/L — SIGNIFICANT CHANGE UP (ref 22–31)
CREAT SERPL-MCNC: 0.56 MG/DL — SIGNIFICANT CHANGE UP (ref 0.5–1.3)
CREAT SERPL-MCNC: 0.58 MG/DL — SIGNIFICANT CHANGE UP (ref 0.5–1.3)
CRP SERPL-MCNC: 26.8 MG/L — HIGH (ref 0–4)
EGFR: 112 ML/MIN/1.73M2 — SIGNIFICANT CHANGE UP
EGFR: 113 ML/MIN/1.73M2 — SIGNIFICANT CHANGE UP
EOSINOPHIL # BLD AUTO: 0.11 K/UL — SIGNIFICANT CHANGE UP (ref 0–0.5)
EOSINOPHIL # BLD AUTO: 0.12 K/UL — SIGNIFICANT CHANGE UP (ref 0–0.5)
EOSINOPHIL NFR BLD AUTO: 0.9 % — SIGNIFICANT CHANGE UP (ref 0–6)
EOSINOPHIL NFR BLD AUTO: 1.1 % — SIGNIFICANT CHANGE UP (ref 0–6)
ESTIMATED AVERAGE GLUCOSE: 235 MG/DL — HIGH (ref 68–114)
GLUCOSE BLDC GLUCOMTR-MCNC: 160 MG/DL — HIGH (ref 70–99)
GLUCOSE BLDC GLUCOMTR-MCNC: 163 MG/DL — HIGH (ref 70–99)
GLUCOSE BLDC GLUCOMTR-MCNC: 164 MG/DL — HIGH (ref 70–99)
GLUCOSE BLDC GLUCOMTR-MCNC: 190 MG/DL — HIGH (ref 70–99)
GLUCOSE BLDC GLUCOMTR-MCNC: 221 MG/DL — HIGH (ref 70–99)
GLUCOSE SERPL-MCNC: 175 MG/DL — HIGH (ref 70–99)
GLUCOSE SERPL-MCNC: 177 MG/DL — HIGH (ref 70–99)
HCT VFR BLD CALC: 36.3 % — SIGNIFICANT CHANGE UP (ref 34.5–45)
HCT VFR BLD CALC: 36.4 % — SIGNIFICANT CHANGE UP (ref 34.5–45)
HDLC SERPL-MCNC: 32 MG/DL — LOW
HGB BLD-MCNC: 11.9 G/DL — SIGNIFICANT CHANGE UP (ref 11.5–15.5)
HGB BLD-MCNC: 12.1 G/DL — SIGNIFICANT CHANGE UP (ref 11.5–15.5)
IMM GRANULOCYTES NFR BLD AUTO: 0.3 % — SIGNIFICANT CHANGE UP (ref 0–0.9)
IMM GRANULOCYTES NFR BLD AUTO: 0.3 % — SIGNIFICANT CHANGE UP (ref 0–0.9)
LACTATE SERPL-SCNC: 1 MMOL/L — SIGNIFICANT CHANGE UP (ref 0.5–2)
LACTATE SERPL-SCNC: 2.1 MMOL/L — HIGH (ref 0.5–2)
LIPID PNL WITH DIRECT LDL SERPL: 95 MG/DL — SIGNIFICANT CHANGE UP
LYME IGG AB: 0.06 INDEX — SIGNIFICANT CHANGE UP (ref 0.01–0.9)
LYME IGG INTERP: NEGATIVE — SIGNIFICANT CHANGE UP
LYME IGM AB: <0.01 INDEX — SIGNIFICANT CHANGE UP (ref 0.01–0.9)
LYME IGM INTERP: NEGATIVE — SIGNIFICANT CHANGE UP
LYMPHOCYTES # BLD AUTO: 2.76 K/UL — SIGNIFICANT CHANGE UP (ref 1–3.3)
LYMPHOCYTES # BLD AUTO: 23.5 % — SIGNIFICANT CHANGE UP (ref 13–44)
LYMPHOCYTES # BLD AUTO: 3.22 K/UL — SIGNIFICANT CHANGE UP (ref 1–3.3)
LYMPHOCYTES # BLD AUTO: 30.3 % — SIGNIFICANT CHANGE UP (ref 13–44)
MAGNESIUM SERPL-MCNC: 1.7 MG/DL — SIGNIFICANT CHANGE UP (ref 1.6–2.6)
MAGNESIUM SERPL-MCNC: 1.8 MG/DL — SIGNIFICANT CHANGE UP (ref 1.6–2.6)
MCHC RBC-ENTMCNC: 30.6 PG — SIGNIFICANT CHANGE UP (ref 27–34)
MCHC RBC-ENTMCNC: 30.6 PG — SIGNIFICANT CHANGE UP (ref 27–34)
MCHC RBC-ENTMCNC: 32.7 GM/DL — SIGNIFICANT CHANGE UP (ref 32–36)
MCHC RBC-ENTMCNC: 33.3 GM/DL — SIGNIFICANT CHANGE UP (ref 32–36)
MCV RBC AUTO: 91.9 FL — SIGNIFICANT CHANGE UP (ref 80–100)
MCV RBC AUTO: 93.6 FL — SIGNIFICANT CHANGE UP (ref 80–100)
MONOCYTES # BLD AUTO: 0.71 K/UL — SIGNIFICANT CHANGE UP (ref 0–0.9)
MONOCYTES # BLD AUTO: 0.83 K/UL — SIGNIFICANT CHANGE UP (ref 0–0.9)
MONOCYTES NFR BLD AUTO: 6.7 % — SIGNIFICANT CHANGE UP (ref 2–14)
MONOCYTES NFR BLD AUTO: 7.1 % — SIGNIFICANT CHANGE UP (ref 2–14)
NEUTROPHILS # BLD AUTO: 6.49 K/UL — SIGNIFICANT CHANGE UP (ref 1.8–7.4)
NEUTROPHILS # BLD AUTO: 7.97 K/UL — HIGH (ref 1.8–7.4)
NEUTROPHILS NFR BLD AUTO: 61.1 % — SIGNIFICANT CHANGE UP (ref 43–77)
NEUTROPHILS NFR BLD AUTO: 67.9 % — SIGNIFICANT CHANGE UP (ref 43–77)
NON HDL CHOLESTEROL: 123 MG/DL — SIGNIFICANT CHANGE UP
NRBC # BLD: 0 /100 WBCS — SIGNIFICANT CHANGE UP (ref 0–0)
NRBC # BLD: 0 /100 WBCS — SIGNIFICANT CHANGE UP (ref 0–0)
PHOSPHATE SERPL-MCNC: 4.6 MG/DL — HIGH (ref 2.5–4.5)
PHOSPHATE SERPL-MCNC: 5.3 MG/DL — HIGH (ref 2.5–4.5)
PLATELET # BLD AUTO: 366 K/UL — SIGNIFICANT CHANGE UP (ref 150–400)
PLATELET # BLD AUTO: 381 K/UL — SIGNIFICANT CHANGE UP (ref 150–400)
POTASSIUM SERPL-MCNC: 3.7 MMOL/L — SIGNIFICANT CHANGE UP (ref 3.5–5.3)
POTASSIUM SERPL-MCNC: 3.8 MMOL/L — SIGNIFICANT CHANGE UP (ref 3.5–5.3)
POTASSIUM SERPL-SCNC: 3.7 MMOL/L — SIGNIFICANT CHANGE UP (ref 3.5–5.3)
POTASSIUM SERPL-SCNC: 3.8 MMOL/L — SIGNIFICANT CHANGE UP (ref 3.5–5.3)
PROT SERPL-MCNC: 7 G/DL — SIGNIFICANT CHANGE UP (ref 6–8.3)
PROT SERPL-MCNC: 7.3 G/DL — SIGNIFICANT CHANGE UP (ref 6–8.3)
RAPID RVP RESULT: SIGNIFICANT CHANGE UP
RBC # BLD: 3.89 M/UL — SIGNIFICANT CHANGE UP (ref 3.8–5.2)
RBC # BLD: 3.95 M/UL — SIGNIFICANT CHANGE UP (ref 3.8–5.2)
RBC # FLD: 16.1 % — HIGH (ref 10.3–14.5)
RBC # FLD: 16.7 % — HIGH (ref 10.3–14.5)
SARS-COV-2 RNA SPEC QL NAA+PROBE: SIGNIFICANT CHANGE UP
SODIUM SERPL-SCNC: 137 MMOL/L — SIGNIFICANT CHANGE UP (ref 135–145)
SODIUM SERPL-SCNC: 138 MMOL/L — SIGNIFICANT CHANGE UP (ref 135–145)
T3 SERPL-MCNC: 106 NG/DL — SIGNIFICANT CHANGE UP (ref 80–200)
T4 FREE SERPL-MCNC: 1.33 NG/DL — SIGNIFICANT CHANGE UP (ref 0.93–1.7)
TRIGL SERPL-MCNC: 142 MG/DL — SIGNIFICANT CHANGE UP
WBC # BLD: 10.62 K/UL — HIGH (ref 3.8–10.5)
WBC # BLD: 11.74 K/UL — HIGH (ref 3.8–10.5)
WBC # FLD AUTO: 10.62 K/UL — HIGH (ref 3.8–10.5)
WBC # FLD AUTO: 11.74 K/UL — HIGH (ref 3.8–10.5)

## 2023-03-17 PROCEDURE — 93306 TTE W/DOPPLER COMPLETE: CPT | Mod: 26

## 2023-03-17 PROCEDURE — 93308 TTE F-UP OR LMTD: CPT | Mod: 26

## 2023-03-17 PROCEDURE — 75561 CARDIAC MRI FOR MORPH W/DYE: CPT | Mod: 26

## 2023-03-17 PROCEDURE — 71045 X-RAY EXAM CHEST 1 VIEW: CPT | Mod: 26

## 2023-03-17 PROCEDURE — 99291 CRITICAL CARE FIRST HOUR: CPT

## 2023-03-17 RX ORDER — CEFTRIAXONE 500 MG/1
2000 INJECTION, POWDER, FOR SOLUTION INTRAMUSCULAR; INTRAVENOUS EVERY 24 HOURS
Refills: 0 | Status: DISCONTINUED | OUTPATIENT
Start: 2023-03-17 | End: 2023-03-17

## 2023-03-17 RX ORDER — ENOXAPARIN SODIUM 100 MG/ML
40 INJECTION SUBCUTANEOUS EVERY 12 HOURS
Refills: 0 | Status: DISCONTINUED | OUTPATIENT
Start: 2023-03-18 | End: 2023-03-22

## 2023-03-17 RX ORDER — FUROSEMIDE 40 MG
40 TABLET ORAL EVERY 24 HOURS
Refills: 0 | Status: DISCONTINUED | OUTPATIENT
Start: 2023-03-17 | End: 2023-03-17

## 2023-03-17 RX ORDER — ENOXAPARIN SODIUM 100 MG/ML
40 INJECTION SUBCUTANEOUS EVERY 24 HOURS
Refills: 0 | Status: DISCONTINUED | OUTPATIENT
Start: 2023-03-17 | End: 2023-03-17

## 2023-03-17 RX ORDER — ENOXAPARIN SODIUM 100 MG/ML
40 INJECTION SUBCUTANEOUS EVERY 12 HOURS
Refills: 0 | Status: DISCONTINUED | OUTPATIENT
Start: 2023-03-17 | End: 2023-03-17

## 2023-03-17 RX ORDER — POTASSIUM CHLORIDE 20 MEQ
20 PACKET (EA) ORAL
Refills: 0 | Status: COMPLETED | OUTPATIENT
Start: 2023-03-17 | End: 2023-03-18

## 2023-03-17 RX ORDER — MAGNESIUM SULFATE 500 MG/ML
1 VIAL (ML) INJECTION ONCE
Refills: 0 | Status: COMPLETED | OUTPATIENT
Start: 2023-03-17 | End: 2023-03-17

## 2023-03-17 RX ORDER — POTASSIUM CHLORIDE 20 MEQ
10 PACKET (EA) ORAL
Refills: 0 | Status: DISCONTINUED | OUTPATIENT
Start: 2023-03-17 | End: 2023-03-17

## 2023-03-17 RX ADMIN — Medication 100 GRAM(S): at 20:00

## 2023-03-17 RX ADMIN — Medication 100 MILLIEQUIVALENT(S): at 21:07

## 2023-03-17 RX ADMIN — Medication 40 MILLIGRAM(S): at 06:32

## 2023-03-17 NOTE — PROGRESS NOTE ADULT - SUBJECTIVE AND OBJECTIVE BOX
ALISHA MIDDLETON 47y Female  MRN#: [id number]     INCOMPLETE NOTE      SUBJECTIVE  Patient is a 47y old Female who presents with a chief complaint of AV block (16 Mar 2023 21:33)    Patient was seen and examined at bedside. As per overnight team, no o/n events, patient resting comfortably. No complaints at this time. Patient denies: fever, chills, lightheadedness, weakness, CP, palpitations, SOB, cough, N/V. ROS otherwise negative.    OBJECTIVE  PAST MEDICAL & SURGICAL HISTORY  S/P     S/P hernia repair      Home Meds:    ALLERGIES:  No Known Allergies    MEDICATIONS:  STANDING MEDICATIONS  insulin lispro (ADMELOG) corrective regimen sliding scale   SubCutaneous three times a day before meals    PRN MEDICATIONS      VITAL SIGNS: Last 24 Hours  T(C): 36.9 (17 Mar 2023 09:54), Max: 37.2 (16 Mar 2023 12:40)  T(F): 98.5 (17 Mar 2023 09:54), Max: 99 (16 Mar 2023 12:40)  HR: 73 (17 Mar 2023 11:00) (45 - 79)  BP: 126/76 (17 Mar 2023 11:00) (107/73 - 178/73)  BP(mean): 95 (17 Mar 2023 11:00) (83 - 117)  RR: 21 (17 Mar 2023 11:00) (15 - 34)  SpO2: 98% (17 Mar 2023 11:00) (95% - 98%)    PHYSICAL EXAM:  Constitutional: resting comfortably in bed; NAD  HEENT: NC/AT, PER, anicteric sclera, no nasal discharge; MMM  Neck: supple; no JVD or thyromegaly  Respiratory: CTA B/L; no W/R/R, no retractions  Cardiac: +S1/S2; RRR; no M/R/G  Gastrointestinal: soft, NT/ND; no rebound or guarding  Back: spine midline, no bony tenderness or step-offs; no CVAT B/L  Extremities: WWP, no clubbing or cyanosis; no peripheral edema  Musculoskeletal: NROM x4; no joint swelling, tenderness or erythema  Vascular: 2+ radial, DP/PT pulses B/L  Dermatologic: skin warm, dry and intact; no rashes, wounds, or scars  Neurologic: AAOx3; CNII-XII grossly intact; no focal deficits  Psychiatric: affect and characteristics of appearance, verbalizations, behaviors are appropriate    LABS:                        11.9   10.62 )-----------( 366      ( 17 Mar 2023 05:27 )             36.4         138  |  100  |  8   ----------------------------<  177<H>  3.8   |  27  |  0.58    Ca    9.6      17 Mar 2023 05:27  Phos  4.6       Mg     1.7         TPro  7.0  /  Alb  3.6  /  TBili  0.9  /  DBili  x   /  AST  37  /  ALT  53<H>  /  AlkPhos  70      PT/INR - ( 16 Mar 2023 13:04 )   PT: 13.3 sec;   INR: 1.12          PTT - ( 16 Mar 2023 13:04 )  PTT:30.8 sec      Lactate, Blood: 2.1 mmol/L *H* (23 @ 05:27)  Troponin T, Serum: 0.01 ng/mL (23 @ 13:04)  Sedimentation Rate, Erythrocyte: 48 mm/Hr *H* (23 @ 13:04)      Culture - Blood (collected 16 Mar 2023 23:49)  Source: .Blood Blood  Preliminary Report (17 Mar 2023 12:01):    No growth at 12 hours      CARDIAC MARKERS ( 16 Mar 2023 13:04 )  x     / 0.01 ng/mL / x     / x     / x          RADIOLOGY:     ALISHA MIDDLETON 47y Female  MRN#: [id number]     SUBJECTIVE  Patient is a 47y old Female who presents with AV block (16 Mar 2023 21:33)    Overnight: Pt arrived to CCU, received 20 IV lasix w/ good UOP.  EKG w/ intermittent 2:1 AV block. Refused lovenox and losartan.     Patient was seen and examined sitting in chair, eating breakfast. She denies any complaints at this time. She states she continues to have mild dyspnea with exertion. Patient denies: fever, chills, lightheadedness, weakness, CP, palpitations, cough, N/V. ROS otherwise negative.    OBJECTIVE  PAST MEDICAL & SURGICAL HISTORY  S/P     S/P hernia repair      Home Meds:    ALLERGIES:  No Known Allergies    MEDICATIONS:  STANDING MEDICATIONS  insulin lispro (ADMELOG) corrective regimen sliding scale   SubCutaneous three times a day before meals    PRN MEDICATIONS      VITAL SIGNS: Last 24 Hours  T(C): 36.9 (17 Mar 2023 09:54), Max: 37.2 (16 Mar 2023 12:40)  T(F): 98.5 (17 Mar 2023 09:54), Max: 99 (16 Mar 2023 12:40)  HR: 73 (17 Mar 2023 11:00) (45 - 79)  BP: 126/76 (17 Mar 2023 11:00) (107/73 - 178/73)  BP(mean): 95 (17 Mar 2023 11:00) (83 - 117)  RR: 21 (17 Mar 2023 11:00) (15 - 34)  SpO2: 98% (17 Mar 2023 11:00) (95% - 98%)    PHYSICAL EXAM:  Constitutional: obese, middle aged female, resting comfortably in chair, NAD  HEENT: NC/AT, PER, anicteric sclera, no nasal discharge; MMM  Neck: supple; no JVD or thyromegaly, no lymphadenopathy  Respiratory: CTA B/L; no W/R/R, no retractions  Cardiac: +S1/S2; slow rate, irregular rhythm no murmurs  Gastrointestinal: soft, NT/ND; no rebound or guarding  Extremities: WWP, no clubbing or cyanosis; 1+ pitting edema, varicose veins, chronic appearing skin changes  Musculoskeletal: NROM x4; no joint swelling, tenderness or erythema  Vascular: 2+ radial, DP/PT pulses B/L  Dermatologic: skin warm, dry and intact; no rashes, wounds, or scars  Neurologic: AAOx3; CNII-XII grossly intact; no focal deficits  Psychiatric: affect and characteristics of appearance, verbalizations, behaviors are appropriate    LABS:                        11.9   10.62 )-----------( 366      ( 17 Mar 2023 05:27 )             36.4         138  |  100  |  8   ----------------------------<  177<H>  3.8   |  27  |  0.58    Ca    9.6      17 Mar 2023 05:27  Phos  4.6       Mg     1.7         TPro  7.0  /  Alb  3.6  /  TBili  0.9  /  DBili  x   /  AST  37  /  ALT  53<H>  /  AlkPhos  70  -    PT/INR - ( 16 Mar 2023 13:04 )   PT: 13.3 sec;   INR: 1.12          PTT - ( 16 Mar 2023 13:04 )  PTT:30.8 sec      Lactate, Blood: 2.1 mmol/L *H* (23 @ 05:27)  Troponin T, Serum: 0.01 ng/mL (23 @ 13:04)  Sedimentation Rate, Erythrocyte: 48 mm/Hr *H* (23 @ 13:04)      Culture - Blood (collected 16 Mar 2023 23:49)  Source: .Blood Blood  Preliminary Report (17 Mar 2023 12:01):    No growth at 12 hours      CARDIAC MARKERS ( 16 Mar 2023 13:04 )  x     / 0.01 ng/mL / x     / x     / x

## 2023-03-17 NOTE — PROGRESS NOTE ADULT - ASSESSMENT
Assessment:    46 y/o F, with no significant PMH, who presented to [facility] ED on 3/16/23 from Dr. Rushing's office w EKG high degree AV block and LBBB w 2 week hx of SOB and increased WOB w ambulation. In addition had accompanying subjective fever, weakness, and light cough; however have all resolved. Patient found to have LBBB and high grade AV block on EKG w current admission for monitoring of AV block with further assessment via cardiac MRI/echo.    NEURO  TRA     PULM  TRA    CARDIOVASCULAR  # 2:1 AV block w LBBB likely 2/2 infectious etiology given history of viral like symptoms (2 wk hx of cough, fever PEREIRA)  vs ischemia vs infiltrative. Bedside TTE was poor quality but showed no effusion and likely slightly reduced EF. EKG w mixed wenckebach w 2:1 AV block. S/p 20 IV lasix w good response. Pacer pads on w atropine at bedside + telemetry.   - c/w Lasix 40 IV   - c/w losartan 25mg OD (pt refused 1st dose, however hypertensive on monitor 147/17)  - f/u TTE  - f/u cardiac MRI - note if reaction from contrast (give IV solumedrol prior to MRI) to rule out infiltrative disease   - f/u ESR/CRP, lyme titers, thyroid studies, RVP  - EP consulted, f/u recs     ENDO  No known hx DM, however glucose 234   - AM lipid Panel  - AM A1c   - Sliding Scale    GI  TRA    RENAL  TRA    HEME  TRA    ID  WBC 11.6, however afebrile, WNL VS, likely reactive   - CTM w daily CBCs     MISC  F: None   E: Replete PRN   N: DASH/TLC   DVT ppx: Lovenox  Dispo: CCU       Assessment:  46 y/o F, with no significant PMH, who presented to [facility] ED from outpatient provider, found to have high degree AV block and LBBB w 2 week hx of SOB and increased WOB w ambulation. Admitted to CCU for monitoring, pending Cardiac MRI for further evaluation of likely myocarditis.     NEURO  TRA     PULM  #PEREIRA/SOB  Pt reports 1 week hx of URI symptoms which have since resolved. Respiratory symptoms improving after IV diuresis, with stable CXR and physical exam  - s/p diuresis as below  - no indication for abx at this time    CARDIOVASCULAR  #High-degree AV Block  2:1 AV block w LBBB likely 2/2 myocarditis likely 2/2 infection given history of viral like symptoms (2 wk hx of cough, fever PEREIRA)  vs ischemia vs infiltrative disease. Bedside TTE was poor quality but showed no effusion and likely slightly reduced EF. EKG w 2:1 AV block. S/p 20 IV lasix w good UOP. Pacer pads on w atropine at bedside + telemetry.   - f/u formal TTE  - d/c Lasix  - d/c losartan 25mg OD (pt refused 1st dose, however hypertensive on monitor 147/17)  - f/u cardiac MRI  - f/u lyme titers  - EP consulted, f/u recs     ENDO  No known hx DM, however glucose 234   A1c 9.8  - Sliding Scale  - patient refusing insulin at this time    GI  TRA    RENAL  RTA    HEME  TRA    ID  WBC 11.6, however afebrile, WNL VS, likely reactive   - CTM w daily CBCs     MISC  F: None   E: Replete PRN   N: DASH/TLC   DVT ppx: pt refusing lovenox  Dispo: CCU       Assessment:  48 y/o F, with no significant PMH, who presented to [facility] ED from outpatient provider, found to have high degree AV block and LBBB w 2 week hx of SOB and increased WOB w ambulation. Admitted to CCU for monitoring, pending Cardiac MRI for further evaluation of likely myocarditis.     NEURO  TRA     PULM  #PEREIRA/SOB  Pt reports 1 week hx of URI symptoms which have since resolved. Respiratory symptoms improving after IV diuresis, with stable CXR and physical exam  - s/p diuresis as below  - no indication for abx at this time    CARDIOVASCULAR  #High-degree AV Block  2:1 AV block w LBBB likely 2/2 myocarditis likely 2/2 infection given history of viral like symptoms (2 wk hx of cough, fever PEREIRA)  vs ischemia vs infiltrative disease. Bedside TTE was poor quality but showed no effusion and likely slightly reduced EF. EKG w 2:1 AV block. S/p 20 IV lasix w good UOP. Pacer pads on w atropine at bedside + telemetry.   - f/u formal TTE  - d/c Lasix  - d/c losartan 25mg OD (pt refusing anti-HTN meds at this time)  - f/u cardiac MRI  - f/u lyme titers  - EP consulted, f/u recs     ENDO  No known hx DM, however glucose 234   A1c 9.8  - Sliding Scale  - patient refusing insulin at this time    GI  TRA    RENAL  TRA    HEME  TRA    ID  WBC 11.6, however afebrile, WNL VS, likely reactive   - CTM w daily CBCs     MISC  F: None   E: Replete PRN   N: DASH/TLC   DVT ppx: pt refusing lovenox  Dispo: CCU

## 2023-03-17 NOTE — CHART NOTE - NSCHARTNOTEFT_GEN_A_CORE
Pt refusing lovenox, sliding scale insulin, and losartan, stating that she does not believe she needs any of these medications at this time. Patient verbalized understanding of the indications for each of these medications. I explained the risks and benefits of each medication individually. Pt is AAOx3 and has capacity to refuse medical treatment.

## 2023-03-18 LAB
ALBUMIN SERPL ELPH-MCNC: 3.5 G/DL — SIGNIFICANT CHANGE UP (ref 3.3–5)
ALBUMIN SERPL ELPH-MCNC: 3.7 G/DL — SIGNIFICANT CHANGE UP (ref 3.3–5)
ALP SERPL-CCNC: 71 U/L — SIGNIFICANT CHANGE UP (ref 40–120)
ALP SERPL-CCNC: 71 U/L — SIGNIFICANT CHANGE UP (ref 40–120)
ALT FLD-CCNC: 45 U/L — SIGNIFICANT CHANGE UP (ref 10–45)
ALT FLD-CCNC: 49 U/L — HIGH (ref 10–45)
ANION GAP SERPL CALC-SCNC: 12 MMOL/L — SIGNIFICANT CHANGE UP (ref 5–17)
ANION GAP SERPL CALC-SCNC: 13 MMOL/L — SIGNIFICANT CHANGE UP (ref 5–17)
APTT BLD: 28.8 SEC — SIGNIFICANT CHANGE UP (ref 27.5–35.5)
AST SERPL-CCNC: 30 U/L — SIGNIFICANT CHANGE UP (ref 10–40)
AST SERPL-CCNC: 32 U/L — SIGNIFICANT CHANGE UP (ref 10–40)
B BURGDOR C6 AB SER-ACNC: POSITIVE
B BURGDOR IGG+IGM SER QL IB: SIGNIFICANT CHANGE UP
B BURGDOR IGG+IGM SER-ACNC: 7.54 INDEX — HIGH (ref 0.01–0.89)
BASOPHILS # BLD AUTO: 0.02 K/UL — SIGNIFICANT CHANGE UP (ref 0–0.2)
BASOPHILS # BLD AUTO: 0.04 K/UL — SIGNIFICANT CHANGE UP (ref 0–0.2)
BASOPHILS NFR BLD AUTO: 0.2 % — SIGNIFICANT CHANGE UP (ref 0–2)
BASOPHILS NFR BLD AUTO: 0.4 % — SIGNIFICANT CHANGE UP (ref 0–2)
BILIRUB SERPL-MCNC: 0.4 MG/DL — SIGNIFICANT CHANGE UP (ref 0.2–1.2)
BILIRUB SERPL-MCNC: 0.8 MG/DL — SIGNIFICANT CHANGE UP (ref 0.2–1.2)
BUN SERPL-MCNC: 11 MG/DL — SIGNIFICANT CHANGE UP (ref 7–23)
BUN SERPL-MCNC: 13 MG/DL — SIGNIFICANT CHANGE UP (ref 7–23)
CALCIUM SERPL-MCNC: 8.8 MG/DL — SIGNIFICANT CHANGE UP (ref 8.4–10.5)
CALCIUM SERPL-MCNC: 9.1 MG/DL — SIGNIFICANT CHANGE UP (ref 8.4–10.5)
CHLORIDE SERPL-SCNC: 101 MMOL/L — SIGNIFICANT CHANGE UP (ref 96–108)
CHLORIDE SERPL-SCNC: 99 MMOL/L — SIGNIFICANT CHANGE UP (ref 96–108)
CO2 SERPL-SCNC: 26 MMOL/L — SIGNIFICANT CHANGE UP (ref 22–31)
CO2 SERPL-SCNC: 26 MMOL/L — SIGNIFICANT CHANGE UP (ref 22–31)
CREAT SERPL-MCNC: 0.55 MG/DL — SIGNIFICANT CHANGE UP (ref 0.5–1.3)
CREAT SERPL-MCNC: 0.57 MG/DL — SIGNIFICANT CHANGE UP (ref 0.5–1.3)
CRP SERPL-MCNC: 25.6 MG/L — HIGH (ref 0–4)
EGFR: 113 ML/MIN/1.73M2 — SIGNIFICANT CHANGE UP
EGFR: 114 ML/MIN/1.73M2 — SIGNIFICANT CHANGE UP
EOSINOPHIL # BLD AUTO: 0.17 K/UL — SIGNIFICANT CHANGE UP (ref 0–0.5)
EOSINOPHIL # BLD AUTO: 0.21 K/UL — SIGNIFICANT CHANGE UP (ref 0–0.5)
EOSINOPHIL NFR BLD AUTO: 1.9 % — SIGNIFICANT CHANGE UP (ref 0–6)
EOSINOPHIL NFR BLD AUTO: 2.1 % — SIGNIFICANT CHANGE UP (ref 0–6)
GLUCOSE BLDC GLUCOMTR-MCNC: 153 MG/DL — HIGH (ref 70–99)
GLUCOSE BLDC GLUCOMTR-MCNC: 162 MG/DL — HIGH (ref 70–99)
GLUCOSE BLDC GLUCOMTR-MCNC: 171 MG/DL — HIGH (ref 70–99)
GLUCOSE BLDC GLUCOMTR-MCNC: 205 MG/DL — HIGH (ref 70–99)
GLUCOSE SERPL-MCNC: 196 MG/DL — HIGH (ref 70–99)
GLUCOSE SERPL-MCNC: 228 MG/DL — HIGH (ref 70–99)
HCT VFR BLD CALC: 35.6 % — SIGNIFICANT CHANGE UP (ref 34.5–45)
HCT VFR BLD CALC: 38.2 % — SIGNIFICANT CHANGE UP (ref 34.5–45)
HGB BLD-MCNC: 11.8 G/DL — SIGNIFICANT CHANGE UP (ref 11.5–15.5)
HGB BLD-MCNC: 12.2 G/DL — SIGNIFICANT CHANGE UP (ref 11.5–15.5)
IMM GRANULOCYTES NFR BLD AUTO: 0.2 % — SIGNIFICANT CHANGE UP (ref 0–0.9)
IMM GRANULOCYTES NFR BLD AUTO: 0.4 % — SIGNIFICANT CHANGE UP (ref 0–0.9)
INR BLD: 1.06 — SIGNIFICANT CHANGE UP (ref 0.88–1.16)
LACTATE SERPL-SCNC: 1.4 MMOL/L — SIGNIFICANT CHANGE UP (ref 0.5–2)
LACTATE SERPL-SCNC: 2.6 MMOL/L — HIGH (ref 0.5–2)
LYME IGG AB: 0.06 INDEX — SIGNIFICANT CHANGE UP (ref 0.01–0.9)
LYME IGG INTERP: NEGATIVE — SIGNIFICANT CHANGE UP
LYME IGM AB: <0.01 INDEX — SIGNIFICANT CHANGE UP (ref 0.01–0.9)
LYME IGM INTERP: NEGATIVE — SIGNIFICANT CHANGE UP
LYMPHOCYTES # BLD AUTO: 2.16 K/UL — SIGNIFICANT CHANGE UP (ref 1–3.3)
LYMPHOCYTES # BLD AUTO: 2.72 K/UL — SIGNIFICANT CHANGE UP (ref 1–3.3)
LYMPHOCYTES # BLD AUTO: 25.2 % — SIGNIFICANT CHANGE UP (ref 13–44)
LYMPHOCYTES # BLD AUTO: 26.1 % — SIGNIFICANT CHANGE UP (ref 13–44)
MAGNESIUM SERPL-MCNC: 1.8 MG/DL — SIGNIFICANT CHANGE UP (ref 1.6–2.6)
MAGNESIUM SERPL-MCNC: 2 MG/DL — SIGNIFICANT CHANGE UP (ref 1.6–2.6)
MCHC RBC-ENTMCNC: 29.5 PG — SIGNIFICANT CHANGE UP (ref 27–34)
MCHC RBC-ENTMCNC: 30.9 PG — SIGNIFICANT CHANGE UP (ref 27–34)
MCHC RBC-ENTMCNC: 31.9 GM/DL — LOW (ref 32–36)
MCHC RBC-ENTMCNC: 33.1 GM/DL — SIGNIFICANT CHANGE UP (ref 32–36)
MCV RBC AUTO: 92.5 FL — SIGNIFICANT CHANGE UP (ref 80–100)
MCV RBC AUTO: 93.2 FL — SIGNIFICANT CHANGE UP (ref 80–100)
MONOCYTES # BLD AUTO: 0.55 K/UL — SIGNIFICANT CHANGE UP (ref 0–0.9)
MONOCYTES # BLD AUTO: 0.68 K/UL — SIGNIFICANT CHANGE UP (ref 0–0.9)
MONOCYTES NFR BLD AUTO: 6.3 % — SIGNIFICANT CHANGE UP (ref 2–14)
MONOCYTES NFR BLD AUTO: 6.6 % — SIGNIFICANT CHANGE UP (ref 2–14)
NEUTROPHILS # BLD AUTO: 5.37 K/UL — SIGNIFICANT CHANGE UP (ref 1.8–7.4)
NEUTROPHILS # BLD AUTO: 7.09 K/UL — SIGNIFICANT CHANGE UP (ref 1.8–7.4)
NEUTROPHILS NFR BLD AUTO: 64.8 % — SIGNIFICANT CHANGE UP (ref 43–77)
NEUTROPHILS NFR BLD AUTO: 65.8 % — SIGNIFICANT CHANGE UP (ref 43–77)
NRBC # BLD: 0 /100 WBCS — SIGNIFICANT CHANGE UP (ref 0–0)
NRBC # BLD: 0 /100 WBCS — SIGNIFICANT CHANGE UP (ref 0–0)
PHOSPHATE SERPL-MCNC: 3.5 MG/DL — SIGNIFICANT CHANGE UP (ref 2.5–4.5)
PHOSPHATE SERPL-MCNC: 4 MG/DL — SIGNIFICANT CHANGE UP (ref 2.5–4.5)
PLATELET # BLD AUTO: 365 K/UL — SIGNIFICANT CHANGE UP (ref 150–400)
PLATELET # BLD AUTO: 374 K/UL — SIGNIFICANT CHANGE UP (ref 150–400)
POTASSIUM SERPL-MCNC: 3.8 MMOL/L — SIGNIFICANT CHANGE UP (ref 3.5–5.3)
POTASSIUM SERPL-MCNC: 4.3 MMOL/L — SIGNIFICANT CHANGE UP (ref 3.5–5.3)
POTASSIUM SERPL-SCNC: 3.8 MMOL/L — SIGNIFICANT CHANGE UP (ref 3.5–5.3)
POTASSIUM SERPL-SCNC: 4.3 MMOL/L — SIGNIFICANT CHANGE UP (ref 3.5–5.3)
PROT SERPL-MCNC: 6.9 G/DL — SIGNIFICANT CHANGE UP (ref 6–8.3)
PROT SERPL-MCNC: 6.9 G/DL — SIGNIFICANT CHANGE UP (ref 6–8.3)
PROTHROM AB SERPL-ACNC: 12.6 SEC — SIGNIFICANT CHANGE UP (ref 10.5–13.4)
RBC # BLD: 3.82 M/UL — SIGNIFICANT CHANGE UP (ref 3.8–5.2)
RBC # BLD: 4.13 M/UL — SIGNIFICANT CHANGE UP (ref 3.8–5.2)
RBC # FLD: 16.5 % — HIGH (ref 10.3–14.5)
RBC # FLD: 16.5 % — HIGH (ref 10.3–14.5)
S PNEUM AG UR QL: NEGATIVE — SIGNIFICANT CHANGE UP
S PYO AG SPEC QL IA: NEGATIVE — SIGNIFICANT CHANGE UP
SODIUM SERPL-SCNC: 138 MMOL/L — SIGNIFICANT CHANGE UP (ref 135–145)
SODIUM SERPL-SCNC: 139 MMOL/L — SIGNIFICANT CHANGE UP (ref 135–145)
WBC # BLD: 10.78 K/UL — HIGH (ref 3.8–10.5)
WBC # BLD: 8.29 K/UL — SIGNIFICANT CHANGE UP (ref 3.8–10.5)
WBC # FLD AUTO: 10.78 K/UL — HIGH (ref 3.8–10.5)
WBC # FLD AUTO: 8.29 K/UL — SIGNIFICANT CHANGE UP (ref 3.8–10.5)

## 2023-03-18 PROCEDURE — 99232 SBSQ HOSP IP/OBS MODERATE 35: CPT

## 2023-03-18 PROCEDURE — 99291 CRITICAL CARE FIRST HOUR: CPT

## 2023-03-18 PROCEDURE — 99223 1ST HOSP IP/OBS HIGH 75: CPT

## 2023-03-18 PROCEDURE — 36000 PLACE NEEDLE IN VEIN: CPT

## 2023-03-18 PROCEDURE — 71045 X-RAY EXAM CHEST 1 VIEW: CPT | Mod: 26

## 2023-03-18 RX ORDER — MAGNESIUM SULFATE 500 MG/ML
2 VIAL (ML) INJECTION ONCE
Refills: 0 | Status: COMPLETED | OUTPATIENT
Start: 2023-03-18 | End: 2023-03-18

## 2023-03-18 RX ORDER — FUROSEMIDE 40 MG
10 TABLET ORAL ONCE
Refills: 0 | Status: COMPLETED | OUTPATIENT
Start: 2023-03-18 | End: 2023-03-18

## 2023-03-18 RX ORDER — CEFTRIAXONE 500 MG/1
2000 INJECTION, POWDER, FOR SOLUTION INTRAMUSCULAR; INTRAVENOUS EVERY 24 HOURS
Refills: 0 | Status: DISCONTINUED | OUTPATIENT
Start: 2023-03-18 | End: 2023-03-22

## 2023-03-18 RX ORDER — POTASSIUM CHLORIDE 20 MEQ
20 PACKET (EA) ORAL ONCE
Refills: 0 | Status: COMPLETED | OUTPATIENT
Start: 2023-03-18 | End: 2023-03-18

## 2023-03-18 RX ADMIN — Medication 20 MILLIEQUIVALENT(S): at 03:14

## 2023-03-18 RX ADMIN — CEFTRIAXONE 100 MILLIGRAM(S): 500 INJECTION, POWDER, FOR SOLUTION INTRAMUSCULAR; INTRAVENOUS at 15:19

## 2023-03-18 RX ADMIN — Medication 25 GRAM(S): at 20:53

## 2023-03-18 RX ADMIN — Medication 20 MILLIEQUIVALENT(S): at 20:53

## 2023-03-18 RX ADMIN — Medication 10 MILLIGRAM(S): at 10:11

## 2023-03-18 RX ADMIN — Medication 20 MILLIEQUIVALENT(S): at 00:36

## 2023-03-18 RX ADMIN — Medication 4: at 11:48

## 2023-03-18 NOTE — CONSULT NOTE ADULT - SUBJECTIVE AND OBJECTIVE BOX
HPI:  48 y/o F, with no significant PMH, who presented to [facility] ED on 3/16/23 from Dr. Rushing's office who did an EKG that shows a high degree AV block and LBBB. Patient states that she was in her usual state of health until about 2 weeks ago, where she had difficulty breathing with ambulation. Patient also w/ subjective fever, weakness, and light cough; however have all resolved.  Patient went to urgent care 1 night ago and had an EKG that showed high degree AV block, which prompted her to see her cardiologist today. Patient feels well overall. Patient denies CP, dizziness, headache, lightheadedness, syncope, fever, chills, malaise, or other symptoms.     In the ED, VS: T: 99F, BP: 125/71 mmHg, HR: 68 bpm, spO2: 96% on RA  Labs significant: WBC: 11.60, BNP: 2421, COVID-19: Negative; CXR: High degree AVB, LBBB @    CXR: Cardiomegaly. Question mild pulmonary edema. Hazy opacification of the peripheral lung bases, most likely due to overlying soft tissue, although   early consolidation cannot be excluded. EKG: High grade AV block, LBBB, rates in 66 bpm   Patient admitted to cardiology/telemetry for further workup of high-grade AV block.  (16 Mar 2023 15:43)      PAST MEDICAL & SURGICAL HISTORY:  S/P       S/P hernia repair            REVIEW OF SYSTEMS:    General:	 no weakness; no fevers, no chills  Skin/Breast: no rash  Respiratory and Thorax: no SOB, no cough  Cardiovascular:	No chest pain  Gastrointestinal:	 no nausea, vomiting , diarrhea  Genitourinary:	no dysuria, no difficulty urinating, no hematuria  Musculoskeletal:	no weakness, no joint swelling/pain  Neurological:	no focal weakness/numbness  Endocrine:	no polyuria, no polydipsia      ANTIBIOTICS:  MEDICATIONS  (STANDING):  cefTRIAXone   IVPB 2000 milliGRAM(s) IV Intermittent every 24 hours  enoxaparin Injectable 40 milliGRAM(s) SubCutaneous every 12 hours  insulin lispro (ADMELOG) corrective regimen sliding scale   SubCutaneous Before meals and at bedtime    MEDICATIONS  (PRN):      Allergies    No Known Allergies    Intolerances        SOCIAL HISTORY:    FAMILY HISTORY:      Vital Signs Last 24 Hrs  T(C): 37.1 (18 Mar 2023 10:00), Max: 37.1 (18 Mar 2023 10:00)  T(F): 98.8 (18 Mar 2023 10:00), Max: 98.8 (18 Mar 2023 10:00)  HR: 83 (18 Mar 2023 14:00) (64 - 92)  BP: 146/84 (18 Mar 2023 14:00) (121/58 - 177/86)  BP(mean): 109 (18 Mar 2023 14:00) (81 - 122)  RR: 17 (18 Mar 2023 14:00) (15 - 37)  SpO2: 97% (18 Mar 2023 14:00) (93% - 99%)    Parameters below as of 18 Mar 2023 14:00  Patient On (Oxygen Delivery Method): room air        PHYSICAL EXAM:  Constitutional: NAD  Eyes: YADI, EOMI  Ear/Nose/Throat: no oral lesion, no sinus tenderness on percussion	  Neck: no JVD, no lymphadenopathy, supple  Respiratory: CTA willis  Cardiovascular: S1S2 RRR, no murmurs  Gastrointestinal: soft, (+) BS, no HSM  Extremities:no e/e/c  Vascular: DP Pulse: right normal; left normal            LABS:                        12.2   8.29  )-----------( 374      ( 18 Mar 2023 06:59 )             38.2     03-18    139  |  101  |  11  ----------------------------<  196<H>  4.3   |  26  |  0.57    Ca    8.8      18 Mar 2023 06:59  Phos  4.0     03-18  Mg     2.0     03-18    TPro  6.9  /  Alb  3.5  /  TBili  0.8  /  DBili  x   /  AST  32  /  ALT  49<H>  /  AlkPhos  71  03-18    PT/INR - ( 18 Mar 2023 06:59 )   PT: 12.6 sec;   INR: 1.06          PTT - ( 18 Mar 2023 06:59 )  PTT:28.8 sec      MICROBIOLOGY: Borrelia burgdorferi IgG/IgM Antibodies (23 @ 23:50)    LYME IgG/IgM Antibodies Result: 8.53 Index    Lyme C6 Interpretation: Positive: A negative result may occur in patients recently (< 14 days) infected  with Borrelia burgdorferi. If early Lyme disease is suspected, consider  collecting a new sample 2 – 4 weeks later for repeat testing.  Reference Range: (expressed as Index values)  < 0.90             Negative  0.90 - 1.09      Equivocal  >= 1.10           Positive  CDC/ASTPHLD Guidelines recommend that all samples judged equivocal or  positive be re-tested by confirmatory immunoassays.  Method: Chemiluminescent Immunoassay      RADIOLOGY & ADDITIONAL STUDIES: reviewed

## 2023-03-18 NOTE — CONSULT NOTE ADULT - ASSESSMENT
46 yo female with no sig PMH presenting with URI symptoms and fatigue, found to have AV block and positive Lyme screen (C6 peptide). Patient endorses spending significant time in her yard that is frequented by deer placing her at risk for Lyme disease. 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 Advise start ceftriaxone 2g IV q24h.

## 2023-03-18 NOTE — PROGRESS NOTE ADULT - SUBJECTIVE AND OBJECTIVE BOX
EPS Progress Note    S:   No complaints this am.      T(C): 37.1 (03-18-23 @ 10:00), Max: 37.1 (03-18-23 @ 10:00)  HR: 70 (03-18-23 @ 15:00) (64 - 92)  BP: 145/73 (03-18-23 @ 15:00) (121/58 - 177/86)  RR: 22 (03-18-23 @ 15:00) (15 - 37)  SpO2: 98% (03-18-23 @ 15:00) (93% - 99%)  Wt(kg): --     Telemetry:  NSR, 1st degree AV block, Mobitz type 1, IVCD          General:  No acute distress      Chest:  Chest is clear to auscultation bilaterally without wheezes, crackles, or rhonchi  Cardiac:  Regular rate and rhythm.  No murmur, rubs, or gallops heard.  Abdomen:  Soft without rebound or guarding.  Bowel sounds are presnt in all 4 quadrants.  No hepatosplenomegaly  Extremities:  No lower extremity edema is present.  No cyanosis or clubbing       MEDICATIONS  (STANDING):  cefTRIAXone   IVPB 2000 milliGRAM(s) IV Intermittent every 24 hours  enoxaparin Injectable 40 milliGRAM(s) SubCutaneous every 12 hours  insulin lispro (ADMELOG) corrective regimen sliding scale   SubCutaneous Before meals and at bedtime    MEDICATIONS  (PRN):                                                         12.2   8.29  )-----------( 374      ( 18 Mar 2023 06:59 )             38.2     03-18    139  |  101  |  11  ----------------------------<  196<H>  4.3   |  26  |  0.57    Ca    8.8      18 Mar 2023 06:59  Phos  4.0     03-18  Mg     2.0     03-18    TPro  6.9  /  Alb  3.5  /  TBili  0.8  /  DBili  x   /  AST  32  /  ALT  49<H>  /  AlkPhos  71  03-18    PT/INR - ( 18 Mar 2023 06:59 )   PT: 12.6 sec;   INR: 1.06          PTT - ( 18 Mar 2023 06:59 )  PTT:28.8 sec

## 2023-03-18 NOTE — PROGRESS NOTE ADULT - ASSESSMENT
Assessment:  48 y/o F, with no significant PMH, who presented to [facility] ED from outpatient provider, found to have high degree AV block and LBBB w 2 week hx of SOB and increased WOB w ambulation. Admitted to CCU for monitoring, pending Cardiac MRI for further evaluation of likely myocarditis.     NEURO  TRA     PULM  #PEREIRA/SOB  Pt reports 1 week hx of URI symptoms which have since resolved. Respiratory symptoms improving after IV diuresis, with stable CXR and physical exam  - s/p diuresis as below  - no indication for abx at this time    CARDIOVASCULAR  #High-degree AV Block  2:1 AV block w LBBB likely 2/2 myocarditis likely 2/2 infection given history of viral like symptoms (2 wk hx of cough, fever PEREIRA)  vs ischemia vs infiltrative disease. Bedside TTE was poor quality but showed no effusion and likely slightly reduced EF. EKG w 2:1 AV block. S/p 20 IV lasix w good UOP. Pacer pads on w atropine at bedside + telemetry.   - f/u formal TTE  - d/c Lasix  - d/c losartan 25mg OD (pt refusing anti-HTN meds at this time)  - f/u cardiac MRI  - f/u lyme titers  - EP consulted, f/u recs     ENDO  No known hx DM, however glucose 234   A1c 9.8  - Sliding Scale  - patient refusing insulin at this time    GI  TRA    RENAL  TRA    HEME  TRA    ID  WBC 11.6, however afebrile, WNL VS, likely reactive   - CTM w daily CBCs     MISC  F: None   E: Replete PRN   N: DASH/TLC   DVT ppx: pt refusing lovenox  Dispo: CCU       Assessment:  46 y/o F, with no significant PMH, who presented to [facility] ED from outpatient provider, w/ 2 weeks of SOB and increased WOB w ambulation, found to have high degree AV block and LBBB, admitted to CCU for monitoring & further evaluation for likely myocarditis.     NEURO  TRA     PULM  #PEREIRA/SOB  Pt reports 1 week hx of URI symptoms which have since resolved. Respiratory symptoms improving after IV diuresis, with stable CXR and physical exam  - s/p diuresis as below  - no indication for abx at this time    CARDIOVASCULAR  #High-degree AV Block  2:1 AV block w LBBB likely 2/2 myocarditis likely 2/2 infection given history of viral like symptoms (2 wk hx of cough, fever PEREIRA)  vs ischemia vs infiltrative disease.   Bedside TTE was poor quality but showed no effusion and likely slightly reduced EF.   EKG w 2:1 AV block. S/p 20 IV lasix w good UOP. Pacer pads on w atropine at bedside + telemetry.   TTE 3/17: moderately reduced LVSF EF 38% with global hypokinesis and normal RV size/function  Lyme titers 3/17: (+) Lyme C6 ab.   - cMRI performed, f/u read  - Start IV Lasix 20mg qd   - d/c losartan 25mg OD (pt refusing anti-HTN meds at this time)  - f/u Lyme Western Blot  - EP consulted, f/u recs     ENDO  No known hx DM, however glucose 234   A1c 9.8  - Sliding Scale  - patient refusing insulin at this time    GI  TRA    RENAL  TRA    HEME  TRA    ID  WBC 11.6, however afebrile, WNL VS, likely reactive   Lyme C6 ab (+). ID Consulted  - As above, f/u Lyme Western Blot  - ID consulted, f/u recs     MISC  F: None   E: Replete PRN   N: DASH/TLC   DVT ppx: pt refusing lovenox  Dispo: CCU       Assessment:  46 y/o F, with no significant PMH, who presented to [facility] ED from outpatient provider, w/ 2 weeks of SOB and increased WOB w ambulation, found to have high degree AV block and LBBB, admitted to CCU for monitoring & further evaluation for likely myocarditis.     NEURO  TRA     PULM  #PEREIRA/SOB  Pt reports 1 week hx of URI symptoms which have since resolved. Respiratory symptoms improving after IV diuresis, with stable CXR and physical exam  - s/p diuresis as below  - no indication for abx at this time    CARDIOVASCULAR  #High-degree AV Block  2:1 AV block w LBBB likely 2/2 myocarditis likely 2/2 infection given history of viral like symptoms (2 wk hx of cough, fever PEREIRA)  vs ischemia vs infiltrative disease.   Bedside TTE was poor quality but showed no effusion and likely slightly reduced EF.   EKG w 2:1 AV block. S/p 20 IV lasix w good UOP. Pacer pads on w atropine at bedside + telemetry.   TTE 3/17: moderately reduced LVSF EF 38% with global hypokinesis and normal RV size/function  Lyme titers 3/17: (+) Lyme C6 ab.   - cMRI performed, f/u read  - 1x Lasix 10mg IV  - d/c losartan 25mg OD (pt refusing anti-HTN meds at this time)  - f/u Lyme Western Blot  - EP consulted, f/u recs     ENDO  No known hx DM, however glucose 234   A1c 9.8  - Sliding Scale  - patient refusing insulin at this time    GI  TRA    RENAL  TRA    HEME  TRA    ID  WBC 11.6, however afebrile, WNL VS, likely reactive   Lyme C6 ab (+). ID Consulted  - As above, f/u Lyme Western Blot  - ID consulted, f/u recs     MISC  F: None   E: Replete PRN   N: DASH/TLC   DVT ppx: pt refusing lovenox  Dispo: CCU

## 2023-03-18 NOTE — PROGRESS NOTE ADULT - SUBJECTIVE AND OBJECTIVE BOX
OVERNIGHT EVENTS: 7PM CMP w mag 1.8, repleated. Verbal consent given for cMRI, went down for cardiac MRI ~2115. Lyme C6 antibody positive. Blood cultures NGTD. Patient w IV infiltration, removed. After attempting to replace line w US guided, patient reports that she does not want us to attempt to place line again/lab draws    SUBJECTIVE / INTERVAL HPI: Patient seen and examined, w/ family at bedside. Slept well, w/o complaints. No chest pain, dyspnea, fever, chills, n/v/d. Answered questions regarding test results for Lyme & cMRI, as well as plan for the day. Discussed need to place another IV today. No other complaints at this time.     VITAL SIGNS:  Vital Signs Last 24 Hrs  T(C): 37.1 (18 Mar 2023 10:00), Max: 37.1 (18 Mar 2023 10:00)  T(F): 98.8 (18 Mar 2023 10:00), Max: 98.8 (18 Mar 2023 10:00)  HR: 78 (18 Mar 2023 12:00) (64 - 92)  BP: 144/77 (18 Mar 2023 12:00) (121/58 - 166/88)  BP(mean): 105 (18 Mar 2023 12:00) (81 - 117)  RR: 30 (18 Mar 2023 12:00) (15 - 37)  SpO2: 98% (18 Mar 2023 12:00) (93% - 98%)    Parameters below as of 18 Mar 2023 12:00  Patient On (Oxygen Delivery Method): room air        PHYSICAL EXAM:  Constitutional: obese, middle aged female, resting comfortably in bed, NAD  HEENT: NC/AT, PER, anicteric sclera, no nasal discharge; MMM  Neck: supple; no JVD or thyromegaly, no lymphadenopathy  Respiratory: CTA B/L; no W/R/R, no retractions  Cardiac: +S1/S2; bradycardic, irregular rhythm, no audible murmurs  Gastrointestinal: soft, NT/ND; no rebound or guarding  Extremities: WWP, no clubbing or cyanosis; 1+ pitting LE edema to mid-shins b/l, +varicose veins, chronic appearing skin changes  Musculoskeletal: NROM x4; no joint swelling, tenderness or erythema  Vascular: 2+ DP pulses  Dermatologic: skin warm, dry and intact; no rashes, wounds, or scars  Neurologic: AAOx3; CNII-XII grossly intact; no focal deficits  Psychiatric: affect and characteristics of appearance, verbalizations, behaviors are appropriate    MEDICATIONS:  MEDICATIONS  (STANDING):  enoxaparin Injectable 40 milliGRAM(s) SubCutaneous every 12 hours  insulin lispro (ADMELOG) corrective regimen sliding scale   SubCutaneous Before meals and at bedtime    MEDICATIONS  (PRN):      ALLERGIES:  Allergies    No Known Allergies    Intolerances        LABS:                        12.2   8.29  )-----------( 374      ( 18 Mar 2023 06:59 )             38.2     03-18    139  |  101  |  11  ----------------------------<  196<H>  4.3   |  26  |  0.57    Ca    8.8      18 Mar 2023 06:59  Phos  4.0     03-18  Mg     2.0     03-18    TPro  6.9  /  Alb  3.5  /  TBili  0.8  /  DBili  x   /  AST  32  /  ALT  49<H>  /  AlkPhos  71  03-18    PT/INR - ( 18 Mar 2023 06:59 )   PT: 12.6 sec;   INR: 1.06          PTT - ( 18 Mar 2023 06:59 )  PTT:28.8 sec    CAPILLARY BLOOD GLUCOSE      POCT Blood Glucose.: 205 mg/dL (18 Mar 2023 11:07)      RADIOLOGY & ADDITIONAL TESTS: Reviewed.

## 2023-03-18 NOTE — PROGRESS NOTE ADULT - ASSESSMENT
47 year-old f emale admitted with high grade AV block and new onset LV dysfunction found to have positive Lyme titers (predominately IgG).  Agree Western blot to confirm diagnosis along with empiric ceftriaxone.  If Western blot negative, would pursue remaining myocarditis workup (aso, kelby, etc)  -maintain pacing pads  -f/u MRI results  -EP will continue to follow

## 2023-03-19 LAB
ALBUMIN SERPL ELPH-MCNC: 3.5 G/DL — SIGNIFICANT CHANGE UP (ref 3.3–5)
ALP SERPL-CCNC: 73 U/L — SIGNIFICANT CHANGE UP (ref 40–120)
ALT FLD-CCNC: 43 U/L — SIGNIFICANT CHANGE UP (ref 10–45)
ANION GAP SERPL CALC-SCNC: 13 MMOL/L — SIGNIFICANT CHANGE UP (ref 5–17)
APTT BLD: 30.1 SEC — SIGNIFICANT CHANGE UP (ref 27.5–35.5)
AST SERPL-CCNC: 33 U/L — SIGNIFICANT CHANGE UP (ref 10–40)
BASOPHILS # BLD AUTO: 0.04 K/UL — SIGNIFICANT CHANGE UP (ref 0–0.2)
BASOPHILS NFR BLD AUTO: 0.3 % — SIGNIFICANT CHANGE UP (ref 0–2)
BILIRUB SERPL-MCNC: 0.6 MG/DL — SIGNIFICANT CHANGE UP (ref 0.2–1.2)
BUN SERPL-MCNC: 12 MG/DL — SIGNIFICANT CHANGE UP (ref 7–23)
CALCIUM SERPL-MCNC: 9.5 MG/DL — SIGNIFICANT CHANGE UP (ref 8.4–10.5)
CHLORIDE SERPL-SCNC: 98 MMOL/L — SIGNIFICANT CHANGE UP (ref 96–108)
CO2 SERPL-SCNC: 24 MMOL/L — SIGNIFICANT CHANGE UP (ref 22–31)
CREAT SERPL-MCNC: 0.49 MG/DL — LOW (ref 0.5–1.3)
EGFR: 117 ML/MIN/1.73M2 — SIGNIFICANT CHANGE UP
EOSINOPHIL # BLD AUTO: 0.34 K/UL — SIGNIFICANT CHANGE UP (ref 0–0.5)
EOSINOPHIL NFR BLD AUTO: 2.9 % — SIGNIFICANT CHANGE UP (ref 0–6)
GLUCOSE BLDC GLUCOMTR-MCNC: 171 MG/DL — HIGH (ref 70–99)
GLUCOSE BLDC GLUCOMTR-MCNC: 180 MG/DL — HIGH (ref 70–99)
GLUCOSE BLDC GLUCOMTR-MCNC: 187 MG/DL — HIGH (ref 70–99)
GLUCOSE BLDC GLUCOMTR-MCNC: 194 MG/DL — HIGH (ref 70–99)
GLUCOSE SERPL-MCNC: 186 MG/DL — HIGH (ref 70–99)
HCT VFR BLD CALC: 38.4 % — SIGNIFICANT CHANGE UP (ref 34.5–45)
HGB BLD-MCNC: 12.3 G/DL — SIGNIFICANT CHANGE UP (ref 11.5–15.5)
IMM GRANULOCYTES NFR BLD AUTO: 0.3 % — SIGNIFICANT CHANGE UP (ref 0–0.9)
INR BLD: 1.04 — SIGNIFICANT CHANGE UP (ref 0.88–1.16)
LACTATE SERPL-SCNC: 1.4 MMOL/L — SIGNIFICANT CHANGE UP (ref 0.5–2)
LYMPHOCYTES # BLD AUTO: 2.92 K/UL — SIGNIFICANT CHANGE UP (ref 1–3.3)
LYMPHOCYTES # BLD AUTO: 24.6 % — SIGNIFICANT CHANGE UP (ref 13–44)
MAGNESIUM SERPL-MCNC: 2.1 MG/DL — SIGNIFICANT CHANGE UP (ref 1.6–2.6)
MCHC RBC-ENTMCNC: 30.1 PG — SIGNIFICANT CHANGE UP (ref 27–34)
MCHC RBC-ENTMCNC: 32 GM/DL — SIGNIFICANT CHANGE UP (ref 32–36)
MCV RBC AUTO: 94.1 FL — SIGNIFICANT CHANGE UP (ref 80–100)
MONOCYTES # BLD AUTO: 0.79 K/UL — SIGNIFICANT CHANGE UP (ref 0–0.9)
MONOCYTES NFR BLD AUTO: 6.6 % — SIGNIFICANT CHANGE UP (ref 2–14)
NEUTROPHILS # BLD AUTO: 7.76 K/UL — HIGH (ref 1.8–7.4)
NEUTROPHILS NFR BLD AUTO: 65.3 % — SIGNIFICANT CHANGE UP (ref 43–77)
NRBC # BLD: 0 /100 WBCS — SIGNIFICANT CHANGE UP (ref 0–0)
PHOSPHATE SERPL-MCNC: 4.5 MG/DL — SIGNIFICANT CHANGE UP (ref 2.5–4.5)
PLATELET # BLD AUTO: 358 K/UL — SIGNIFICANT CHANGE UP (ref 150–400)
POTASSIUM SERPL-MCNC: 4.2 MMOL/L — SIGNIFICANT CHANGE UP (ref 3.5–5.3)
POTASSIUM SERPL-SCNC: 4.2 MMOL/L — SIGNIFICANT CHANGE UP (ref 3.5–5.3)
PROT SERPL-MCNC: 7.3 G/DL — SIGNIFICANT CHANGE UP (ref 6–8.3)
PROTHROM AB SERPL-ACNC: 12.4 SEC — SIGNIFICANT CHANGE UP (ref 10.5–13.4)
RBC # BLD: 4.08 M/UL — SIGNIFICANT CHANGE UP (ref 3.8–5.2)
RBC # FLD: 16.3 % — HIGH (ref 10.3–14.5)
S PYO DNA THROAT QL NAA+PROBE: SIGNIFICANT CHANGE UP
SODIUM SERPL-SCNC: 135 MMOL/L — SIGNIFICANT CHANGE UP (ref 135–145)
WBC # BLD: 11.88 K/UL — HIGH (ref 3.8–10.5)
WBC # FLD AUTO: 11.88 K/UL — HIGH (ref 3.8–10.5)

## 2023-03-19 PROCEDURE — 99291 CRITICAL CARE FIRST HOUR: CPT

## 2023-03-19 PROCEDURE — 99232 SBSQ HOSP IP/OBS MODERATE 35: CPT

## 2023-03-19 RX ADMIN — CEFTRIAXONE 100 MILLIGRAM(S): 500 INJECTION, POWDER, FOR SOLUTION INTRAMUSCULAR; INTRAVENOUS at 14:55

## 2023-03-19 NOTE — CHART NOTE - NSCHARTNOTEFT_GEN_A_CORE
Patient refusing frequent blood pressure measurements and would like a reduction of checks - every 4 hours. Patient made aware about the risks of less frequent blood pressure checks as well as the importance/benefits of frequent blood pressure checks. The patient is at fully capacity and AOx3. The patient is also refusing anti-hypertensive medications and was also made aware of the risks and benefits of each medication. The patient verbalized understanding of both the importance/indications of the medications as well as the importance/indications of blood pressure checks. Patient refusing frequent blood pressure measurements and would like a reduction of checks - every 4 hours. Patient made aware about the risks of less frequent blood pressure checks as well as the importance/benefits of frequent blood pressure checks. The patient is at fully capacity and AOx3. The patient is also refusing anti-hypertensive medications and was also made aware of the risks and benefits of each medication. The patient verbalized understanding of both the importance/indications of the medications as well as the importance/indications of blood pressure checks. In addition, patient continues to refuse insulin o/n, made aware of risks of refusing insulin.

## 2023-03-19 NOTE — PROGRESS NOTE ADULT - SUBJECTIVE AND OBJECTIVE BOX
ALISHA MIDDLETON 47y Female  MRN#: [id number]     SUBJECTIVE  Patient is a 47y old Female who presents with a chief complaint of AV block (18 Mar 2023 15:58)    Patient was seen and examined at bedside. As per overnight team, no o/n events, patient resting comfortably this AM. C/o mild lightheadedness when transferring from bed to chair. Patient denies: fever, chills, weakness, CP, palpitations, SOB, cough, N/V. ROS otherwise negative.    OBJECTIVE  PAST MEDICAL & SURGICAL HISTORY  S/P     S/P hernia repair      Home Meds:    ALLERGIES:  No Known Allergies    MEDICATIONS:  STANDING MEDICATIONS  cefTRIAXone   IVPB 2000 milliGRAM(s) IV Intermittent every 24 hours  enoxaparin Injectable 40 milliGRAM(s) SubCutaneous every 12 hours  insulin lispro (ADMELOG) corrective regimen sliding scale   SubCutaneous Before meals and at bedtime    PRN MEDICATIONS      VITAL SIGNS: Last 24 Hours  T(C): 36.4 (19 Mar 2023 09:00), Max: 36.8 (18 Mar 2023 22:28)  T(F): 97.5 (19 Mar 2023 09:00), Max: 98.3 (18 Mar 2023 22:28)  HR: 76 (19 Mar 2023 10:00) (67 - 92)  BP: 149/79 (19 Mar 2023 10:00) (111/56 - 177/86)  BP(mean): 108 (19 Mar 2023 10:00) (79 - 122)  RR: 27 (19 Mar 2023 10:00) (12 - 35)  SpO2: 98% (19 Mar 2023 10:00) (92% - 99%)    PHYSICAL EXAM:  Constitutional: obese, middle aged female, resting comfortably in bed, NAD  HEENT: NC/AT, PER, anicteric sclera, no nasal discharge; MMM  Neck: supple; no JVD or thyromegaly, no lymphadenopathy  Respiratory: CTA B/L; no W/R/R, no retractions  Cardiac: +S1/S2; regular rate, irregular rhythm, no audible murmurs  Gastrointestinal: soft, round, NT/ND; no rebound or guarding  Extremities: WWP, no clubbing or cyanosis; 1+ pitting LE edema to mid-shins b/l, +varicose veins, chronic appearing skin changes  Musculoskeletal: NROM x4; no joint swelling, tenderness or erythema  Vascular: 2+ DP pulses  Neurologic: AAOx3; CNII-XII grossly intact; no focal deficits  Psychiatric: affect and characteristics of appearance, verbalizations, behaviors are appropriate    LABS:                        12.3   11.88 )-----------( 358      ( 19 Mar 2023 05:39 )             38.4         135  |  98  |  12  ----------------------------<  186<H>  4.2   |  24  |  0.49<L>    Ca    9.5      19 Mar 2023 05:39  Phos  4.5       Mg     2.1         TPro  7.3  /  Alb  3.5  /  TBili  0.6  /  DBili  x   /  AST  33  /  ALT  43  /  AlkPhos  73  -    PT/INR - ( 19 Mar 2023 05:39 )   PT: 12.4 sec;   INR: 1.04          PTT - ( 19 Mar 2023 05:39 )  PTT:30.1 sec      Lactate, Blood: 1.4 mmol/L (23 @ 05:39)  Lactate, Blood: 2.6 mmol/L *H* (23 @ 18:03)      Culture - Blood (collected 16 Mar 2023 23:49)  Source: .Blood Blood  Preliminary Report (19 Mar 2023 00:00):    No growth at 2 days.

## 2023-03-19 NOTE — PROGRESS NOTE ADULT - ASSESSMENT
Assessment:  48 y/o F, with no significant PMH, who presented to [facility] ED from outpatient provider, w/ 2 weeks of SOB and increased WOB w ambulation, found to have high degree AV block and LBBB, admitted to CCU for monitoring & further evaluation for likely myocarditis.     NEURO  TRA     PULM  #PEREIRA/SOB--RESOLVED  Pt reports 1 week hx of URI symptoms which have since resolved. Respiratory symptoms improving after IV diuresis, with stable CXR and physical exam  - s/p diuresis as below    CARDIOVASCULAR  #High-degree AV Block  2:1 AV block w LBBB likely 2/2 myocarditis likely 2/2 infection given history of viral like symptoms (2 wk hx of cough, fever PEREIRA)  vs ischemia vs infiltrative disease.   Bedside TTE was poor quality but showed no effusion and likely slightly reduced EF.   EKG w 2:1 AV block. S/p 20 IV lasix w good UOP. Pacer pads on w atropine at bedside + telemetry.   TTE 3/17: moderately reduced LVSF EF 38% with global hypokinesis and normal RV size/function  Lyme titers 3/17: (+) Lyme C6 ab.     Rhythm on 3/19 AM 1st degree AV Block w/ infrequent PVCs, LBBB.   - cMRI performed, pending read  - no further diuresis  - d/c losartan 25mg OD (pt refusing anti-HTN meds at this time)  - f/u Lyme Western Blot    ENDO  No known hx DM, however glucose 234   A1c 9.8  - Sliding Scale  - patient refusing insulin at this time    GI  TRA    RENAL  TRA    HEME  TRA    ID  #rule-out Lyme Disease  Lyme C6 ab (+). ID Consulted  - c/w CTX 2g QD for 14 days (last day April 1st)  - pt will likely need mid-line for home infusions  - f/u Lyme Western Blot  - ID consulted, f/u recs     MISC  F: None   E: Replete PRN   N: DASH/TLC   DVT ppx: pt refusing lovenox  Dispo: CCU

## 2023-03-19 NOTE — PROVIDER CONTACT NOTE (OTHER) - SITUATION
pt c/o lightheadedness and had multiple frequent PVC's while ambulating on portable monitor in hallway

## 2023-03-20 DIAGNOSIS — A69.20 LYME DISEASE, UNSPECIFIED: ICD-10-CM

## 2023-03-20 DIAGNOSIS — E11.9 TYPE 2 DIABETES MELLITUS WITHOUT COMPLICATIONS: ICD-10-CM

## 2023-03-20 DIAGNOSIS — I40.9 ACUTE MYOCARDITIS, UNSPECIFIED: ICD-10-CM

## 2023-03-20 DIAGNOSIS — I44.39 OTHER ATRIOVENTRICULAR BLOCK: ICD-10-CM

## 2023-03-20 DIAGNOSIS — I50.22 CHRONIC SYSTOLIC (CONGESTIVE) HEART FAILURE: ICD-10-CM

## 2023-03-20 LAB
ACE SERPL-CCNC: 28 U/L — SIGNIFICANT CHANGE UP (ref 14–82)
ALBUMIN SERPL ELPH-MCNC: 3.8 G/DL — SIGNIFICANT CHANGE UP (ref 3.3–5)
ALP SERPL-CCNC: 67 U/L — SIGNIFICANT CHANGE UP (ref 40–120)
ALT FLD-CCNC: 36 U/L — SIGNIFICANT CHANGE UP (ref 10–45)
ANION GAP SERPL CALC-SCNC: 14 MMOL/L — SIGNIFICANT CHANGE UP (ref 5–17)
AST SERPL-CCNC: 27 U/L — SIGNIFICANT CHANGE UP (ref 10–40)
BASOPHILS # BLD AUTO: 0.04 K/UL — SIGNIFICANT CHANGE UP (ref 0–0.2)
BASOPHILS NFR BLD AUTO: 0.4 % — SIGNIFICANT CHANGE UP (ref 0–2)
BILIRUB SERPL-MCNC: 0.7 MG/DL — SIGNIFICANT CHANGE UP (ref 0.2–1.2)
BUN SERPL-MCNC: 13 MG/DL — SIGNIFICANT CHANGE UP (ref 7–23)
C3 SERPL-MCNC: 213 MG/DL — HIGH (ref 81–157)
C4 SERPL-MCNC: 36 MG/DL — SIGNIFICANT CHANGE UP (ref 13–39)
CALCIUM SERPL-MCNC: 9.4 MG/DL — SIGNIFICANT CHANGE UP (ref 8.4–10.5)
CARDIOLIPIN AB SER-ACNC: POSITIVE
CHLORIDE SERPL-SCNC: 100 MMOL/L — SIGNIFICANT CHANGE UP (ref 96–108)
CMV DNA CSF QL NAA+PROBE: SIGNIFICANT CHANGE UP
CMV DNA SPEC NAA+PROBE-LOG#: SIGNIFICANT CHANGE UP LOG10IU/ML
CO2 SERPL-SCNC: 25 MMOL/L — SIGNIFICANT CHANGE UP (ref 22–31)
CREAT SERPL-MCNC: 0.53 MG/DL — SIGNIFICANT CHANGE UP (ref 0.5–1.3)
EBV EA AB SER IA-ACNC: 11.1 U/ML — HIGH
EBV EA AB TITR SER IF: POSITIVE
EBV EA IGG SER-ACNC: POSITIVE
EBV NA IGG SER IA-ACNC: 263 U/ML — HIGH
EBV PATRN SPEC IB-IMP: SIGNIFICANT CHANGE UP
EBV VCA IGG AVIDITY SER QL IA: POSITIVE
EBV VCA IGM SER IA-ACNC: <10 U/ML — SIGNIFICANT CHANGE UP
EBV VCA IGM SER IA-ACNC: >750 U/ML — HIGH
EBV VCA IGM TITR FLD: NEGATIVE — SIGNIFICANT CHANGE UP
EGFR: 115 ML/MIN/1.73M2 — SIGNIFICANT CHANGE UP
EOSINOPHIL # BLD AUTO: 0.24 K/UL — SIGNIFICANT CHANGE UP (ref 0–0.5)
EOSINOPHIL NFR BLD AUTO: 2.7 % — SIGNIFICANT CHANGE UP (ref 0–6)
GLUCOSE BLDC GLUCOMTR-MCNC: 181 MG/DL — HIGH (ref 70–99)
GLUCOSE BLDC GLUCOMTR-MCNC: 184 MG/DL — HIGH (ref 70–99)
GLUCOSE BLDC GLUCOMTR-MCNC: 187 MG/DL — HIGH (ref 70–99)
GLUCOSE BLDC GLUCOMTR-MCNC: 190 MG/DL — HIGH (ref 70–99)
GLUCOSE SERPL-MCNC: 193 MG/DL — HIGH (ref 70–99)
HCT VFR BLD CALC: 37.4 % — SIGNIFICANT CHANGE UP (ref 34.5–45)
HGB BLD-MCNC: 12.2 G/DL — SIGNIFICANT CHANGE UP (ref 11.5–15.5)
IMM GRANULOCYTES NFR BLD AUTO: 0.3 % — SIGNIFICANT CHANGE UP (ref 0–0.9)
LYMPHOCYTES # BLD AUTO: 2.02 K/UL — SIGNIFICANT CHANGE UP (ref 1–3.3)
LYMPHOCYTES # BLD AUTO: 22.7 % — SIGNIFICANT CHANGE UP (ref 13–44)
MAGNESIUM SERPL-MCNC: 1.8 MG/DL — SIGNIFICANT CHANGE UP (ref 1.6–2.6)
MCHC RBC-ENTMCNC: 30.7 PG — SIGNIFICANT CHANGE UP (ref 27–34)
MCHC RBC-ENTMCNC: 32.6 GM/DL — SIGNIFICANT CHANGE UP (ref 32–36)
MCV RBC AUTO: 94 FL — SIGNIFICANT CHANGE UP (ref 80–100)
MONOCYTES # BLD AUTO: 0.6 K/UL — SIGNIFICANT CHANGE UP (ref 0–0.9)
MONOCYTES NFR BLD AUTO: 6.7 % — SIGNIFICANT CHANGE UP (ref 2–14)
NEUTROPHILS # BLD AUTO: 5.98 K/UL — SIGNIFICANT CHANGE UP (ref 1.8–7.4)
NEUTROPHILS NFR BLD AUTO: 67.2 % — SIGNIFICANT CHANGE UP (ref 43–77)
NRBC # BLD: 0 /100 WBCS — SIGNIFICANT CHANGE UP (ref 0–0)
PHOSPHATE SERPL-MCNC: 4.6 MG/DL — HIGH (ref 2.5–4.5)
PLATELET # BLD AUTO: 393 K/UL — SIGNIFICANT CHANGE UP (ref 150–400)
POTASSIUM SERPL-MCNC: 4 MMOL/L — SIGNIFICANT CHANGE UP (ref 3.5–5.3)
POTASSIUM SERPL-SCNC: 4 MMOL/L — SIGNIFICANT CHANGE UP (ref 3.5–5.3)
PROT SERPL-MCNC: 6.7 G/DL — SIGNIFICANT CHANGE UP (ref 6–8.3)
RBC # BLD: 3.98 M/UL — SIGNIFICANT CHANGE UP (ref 3.8–5.2)
RBC # FLD: 16 % — HIGH (ref 10.3–14.5)
SODIUM SERPL-SCNC: 139 MMOL/L — SIGNIFICANT CHANGE UP (ref 135–145)
WBC # BLD: 8.91 K/UL — SIGNIFICANT CHANGE UP (ref 3.8–10.5)
WBC # FLD AUTO: 8.91 K/UL — SIGNIFICANT CHANGE UP (ref 3.8–10.5)

## 2023-03-20 PROCEDURE — 99291 CRITICAL CARE FIRST HOUR: CPT | Mod: GC

## 2023-03-20 PROCEDURE — 99232 SBSQ HOSP IP/OBS MODERATE 35: CPT

## 2023-03-20 RX ORDER — MAGNESIUM SULFATE 500 MG/ML
1 VIAL (ML) INJECTION ONCE
Refills: 0 | Status: COMPLETED | OUTPATIENT
Start: 2023-03-20 | End: 2023-03-20

## 2023-03-20 RX ADMIN — CEFTRIAXONE 100 MILLIGRAM(S): 500 INJECTION, POWDER, FOR SOLUTION INTRAMUSCULAR; INTRAVENOUS at 14:36

## 2023-03-20 RX ADMIN — Medication 100 GRAM(S): at 08:23

## 2023-03-20 NOTE — PROGRESS NOTE ADULT - CRITICAL CARE SERVICES
Dose Setting #3 (Mj/Cm2): 791
Total Energy In Joules: 311.44
Treatment Number: 6
Post-Care Instructions: I reviewed with the patient in detail post-care instructions. Patient should stay away from the sun and wear sun protection until treated areas are fully healed.
40
Location #2: axillae
Detail Level: Generalized
50
Location #3: scalp
Location #1: feet, legs, arms, back, hands, buttocks, toenails, nails, chest
Dose Setting #2 (Mj/Cm2): 0
Dose Setting #1 (Mj/Cm2): 416
50
Consent: see scanned.
Total Square Area In Cm2 (Required For Proper Billing): 448

## 2023-03-20 NOTE — PROGRESS NOTE ADULT - ASSESSMENT
47F with no significant PMHx presenting with URI symptoms and fatigue, found to have AV block and positive Lyme screen (C6 peptide). Patient endorses spending significant time in her yard that is frequented by deer placing her at risk for Lyme disease. 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   - C/w CTX 2g IV q24h  - f/u western blot for confirmatory testing  - f/u cardiac MRI read  - trend EKGs    ID team 1 following

## 2023-03-20 NOTE — PROGRESS NOTE ADULT - PROBLEM SELECTOR PLAN 1
2:1 AV block w LBBB likely 2/2 myocarditis likely 2/2 infection given history of viral like symptoms (2 wk hx of cough, fever PEREIRA)  vs ischemia vs infiltrative disease.   - ECHO 03/17/2023: Moderately reduced LVSF, EF: 38%, with global hypokinesis and normal RV size/function   - Cardiac MRI 3/17/23: LV is normal in size. No evidence of LVH. LV global systolic function is reduced. Global hypokinesis with paradoxical septal motion.  RV is normal in size. RV global systolic function is normal. RV ejection fraction is 60%.  No significant valvular abnormalities.  No significant abnormalities of the visualized portions of the great vessels. On delayed enhancement imaging,  there is evidence of myocardial scarring in a non ischemic pattern. The pattern can be seen in myocarditis.  - Patient's EKG improved after initiation of - CTX 2g QD for 14 days (last day April 1st)  - Lyme titers +, f/u western blot   - EP following

## 2023-03-20 NOTE — PROGRESS NOTE ADULT - PROBLEM SELECTOR PLAN 2
ECHO 03/17/2023: Moderately reduced LVSF, EF: 38%, with global hypokinesis and normal RV size/function  - s/p lasix 20 mg IVP x 1 and Lasix 40 mg IVP x 1, has not required additional Lasix since  - No further diuresis needed at this time

## 2023-03-20 NOTE — PROGRESS NOTE ADULT - SUBJECTIVE AND OBJECTIVE BOX
Will continue to MONITOR the POSTERIOR CAPSULE. ALISHA MIDDLETON 47y Female  MRN#: [id number]     SUBJECTIVE  Patient is a 47y old Female who presents with a chief complaint of AV block (19 Mar 2023 16:40)    Patient was seen and examined at bedside. As per overnight team, no o/n events, patient resting comfortably. No complaints at this time. Patient denies: fever, chills, lightheadedness, weakness, CP, palpitations, SOB, cough, N/V. ROS otherwise negative.    OBJECTIVE  PAST MEDICAL & SURGICAL HISTORY  S/P     S/P hernia repair      Home Meds:    ALLERGIES:  No Known Allergies    MEDICATIONS:  STANDING MEDICATIONS  cefTRIAXone   IVPB 2000 milliGRAM(s) IV Intermittent every 24 hours  enoxaparin Injectable 40 milliGRAM(s) SubCutaneous every 12 hours  insulin lispro (ADMELOG) corrective regimen sliding scale   SubCutaneous Before meals and at bedtime  magnesium sulfate  IVPB 1 Gram(s) IV Intermittent once    PRN MEDICATIONS      VITAL SIGNS: Last 24 Hours  T(C): 36.4 (20 Mar 2023 00:53), Max: 36.6 (19 Mar 2023 17:27)  T(F): 97.6 (20 Mar 2023 00:53), Max: 97.8 (19 Mar 2023 17:27)  HR: 81 (20 Mar 2023 07:00) (68 - 104)  BP: 136/84 (20 Mar 2023 04:00) (120/58 - 164/82)  BP(mean): 106 (20 Mar 2023 04:00) (82 - 121)  RR: 25 (20 Mar 2023 07:00) (12 - 44)  SpO2: 100% (20 Mar 2023 04:00) (95% - 100%)    PHYSICAL EXAM:  Constitutional: resting comfortably in bed; NAD  HEENT: NC/AT, PER, anicteric sclera, no nasal discharge; MMM  Neck: supple; no JVD or thyromegaly  Respiratory: CTA B/L; no W/R/R, no retractions  Cardiac: +S1/S2; RRR; no M/R/G  Gastrointestinal: soft, NT/ND; no rebound or guarding  Back: spine midline, no bony tenderness or step-offs; no CVAT B/L  Extremities: WWP, no clubbing or cyanosis; no peripheral edema  Musculoskeletal: NROM x4; no joint swelling, tenderness or erythema  Vascular: 2+ radial, DP/PT pulses B/L  Dermatologic: skin warm, dry and intact; no rashes, wounds, or scars  Neurologic: AAOx3; CNII-XII grossly intact; no focal deficits  Psychiatric: affect and characteristics of appearance, verbalizations, behaviors are appropriate    LABS:                        12.2   8.91  )-----------( 393      ( 20 Mar 2023 05:30 )             37.4     03-20    139  |  100  |  13  ----------------------------<  193<H>  4.0   |  25  |  0.53    Ca    9.4      20 Mar 2023 05:30  Phos  4.6     03-20  Mg     1.8     03-20    TPro  6.7  /  Alb  3.8  /  TBili  0.7  /  DBili  x   /  AST  27  /  ALT  36  /  AlkPhos  67  03-20    PT/INR - ( 19 Mar 2023 05:39 )   PT: 12.4 sec;   INR: 1.04          PTT - ( 19 Mar 2023 05:39 )  PTT:30.1 sec          Culture - Group A Streptococcus (collected 18 Mar 2023 11:44)  Source: Cult/Viral Throat  Preliminary Report (19 Mar 2023 10:36):    No beta hemolytic streptococci or Arcanobacterium haemolyticum isolated.    to date          RADIOLOGY:     ALISHA MIDDLETON 47y Female  MRN#: [id number]     SUBJECTIVE  Patient is a 47y old Female who presents with a chief complaint of AV block (19 Mar 2023 16:40)    Overnight: EBV labs c/w past infection. No other overnight events.    Patient was seen and examined at bedside. Patient resting comfortably. No complaints at this time. Pt states she became lightheaded while walking yesterday. Per RN, observed PVCs on portable monitor on tele. Patient denies: fever, chills, weakness, CP, palpitations, SOB, cough, N/V. ROS otherwise negative.    OBJECTIVE  PAST MEDICAL & SURGICAL HISTORY  S/P     S/P hernia repair      Home Meds:    ALLERGIES:  No Known Allergies    MEDICATIONS:  STANDING MEDICATIONS  cefTRIAXone   IVPB 2000 milliGRAM(s) IV Intermittent every 24 hours  enoxaparin Injectable 40 milliGRAM(s) SubCutaneous every 12 hours  insulin lispro (ADMELOG) corrective regimen sliding scale   SubCutaneous Before meals and at bedtime  magnesium sulfate  IVPB 1 Gram(s) IV Intermittent once    PRN MEDICATIONS      VITAL SIGNS: Last 24 Hours  T(C): 36.4 (20 Mar 2023 00:53), Max: 36.6 (19 Mar 2023 17:27)  T(F): 97.6 (20 Mar 2023 00:53), Max: 97.8 (19 Mar 2023 17:27)  HR: 81 (20 Mar 2023 07:00) (68 - 104)  BP: 136/84 (20 Mar 2023 04:00) (120/58 - 164/82)  BP(mean): 106 (20 Mar 2023 04:00) (82 - 121)  RR: 25 (20 Mar 2023 07:00) (12 - 44)  SpO2: 100% (20 Mar 2023 04:00) (95% - 100%)    PHYSICAL EXAM:  Constitutional: obese, middle aged female, resting comfortably in bed, NAD  HEENT: NC/AT, PER, anicteric sclera, no nasal discharge; MMM  Neck: supple; no JVD or thyromegaly, no lymphadenopathy  Respiratory: CTA B/L; no W/R/R, no retractions  Cardiac: +S1/S2; regular rate, irregular rhythm, no audible murmurs  Gastrointestinal: soft, round, NT/ND; no rebound or guarding  Extremities: WWP, no clubbing or cyanosis; 1+ pitting LE edema to mid-shins b/l, +varicose veins, chronic appearing skin changes  Musculoskeletal: NROM x4; no joint swelling, tenderness or erythema  Vascular: 2+ DP pulses  Neurologic: AAOx3; CNII-XII grossly intact; no focal deficits  Psychiatric: affect and characteristics of appearance, verbalizations, behaviors are appropriate    LABS:                        12.2   8.91  )-----------( 393      ( 20 Mar 2023 05:30 )             37.4     03-20    139  |  100  |  13  ----------------------------<  193<H>  4.0   |  25  |  0.53    Ca    9.4      20 Mar 2023 05:30  Phos  4.6     03-20  Mg     1.8     03-20    TPro  6.7  /  Alb  3.8  /  TBili  0.7  /  DBili  x   /  AST  27  /  ALT  36  /  AlkPhos  67  03-20    PT/INR - ( 19 Mar 2023 05:39 )   PT: 12.4 sec;   INR: 1.04          PTT - ( 19 Mar 2023 05:39 )  PTT:30.1 sec          Culture - Group A Streptococcus (collected 18 Mar 2023 11:44)  Source: Cult/Viral Throat  Preliminary Report (19 Mar 2023 10:36):    No beta hemolytic streptococci or Arcanobacterium haemolyticum isolated.    to date       ALISHA MIDDLETON 47y Female  MRN#: [id number]     ******Transfer CCU to [facility]******  Hospital Course:  48 y/o F, with no reported PMH, who presented to [facility] ED from outpatient provider, w/ 2 weeks of SOB and increased WOB w ambulation, found to have high degree AV block and LBBB, admitted to CCU for monitoring and acute heart failure management. CXR showed mild pulmonary edema, labs with no major electrolyte abnormalities and no anemia, TTE shows global LV dysfunction with LVEF~30%, normal cavity size and normal RV. EP consulted, not recommending pacemaker at this time.  Lyme C6 abs positive, ID consulted, started on empiric CTX. EBV abs c/w past infection. Cardiac MRI read pending. Pt now with 1st degree AV block x 3 days after initiating CTX treatment. Stable for stepdown to [employer] tele.    SUBJECTIVE  Patient is a 47y old Female who presents with a chief complaint of AV block (19 Mar 2023 16:40)    Overnight: EBV labs c/w past infection. No other overnight events.    Patient was seen and examined at bedside. Patient resting comfortably. No complaints at this time. Pt states she became lightheaded while walking yesterday. Per RN, observed PVCs on portable monitor on tele. Patient denies: fever, chills, weakness, CP, palpitations, SOB, cough, N/V. ROS otherwise negative.    OBJECTIVE  PAST MEDICAL & SURGICAL HISTORY  S/P     S/P hernia repair      Home Meds:    ALLERGIES:  No Known Allergies    MEDICATIONS:  STANDING MEDICATIONS  cefTRIAXone   IVPB 2000 milliGRAM(s) IV Intermittent every 24 hours  enoxaparin Injectable 40 milliGRAM(s) SubCutaneous every 12 hours  insulin lispro (ADMELOG) corrective regimen sliding scale   SubCutaneous Before meals and at bedtime  magnesium sulfate  IVPB 1 Gram(s) IV Intermittent once    PRN MEDICATIONS      VITAL SIGNS: Last 24 Hours  T(C): 36.4 (20 Mar 2023 00:53), Max: 36.6 (19 Mar 2023 17:27)  T(F): 97.6 (20 Mar 2023 00:53), Max: 97.8 (19 Mar 2023 17:27)  HR: 81 (20 Mar 2023 07:00) (68 - 104)  BP: 136/84 (20 Mar 2023 04:00) (120/58 - 164/82)  BP(mean): 106 (20 Mar 2023 04:00) (82 - 121)  RR: 25 (20 Mar 2023 07:00) (12 - 44)  SpO2: 100% (20 Mar 2023 04:00) (95% - 100%)    PHYSICAL EXAM:  Constitutional: obese, middle aged female, resting comfortably in bed, NAD  HEENT: NC/AT, PER, anicteric sclera, no nasal discharge; MMM  Neck: supple; no JVD or thyromegaly, no lymphadenopathy  Respiratory: CTA B/L; no W/R/R, no retractions  Cardiac: +S1/S2; regular rate, irregular rhythm, no audible murmurs  Gastrointestinal: soft, round, NT/ND; no rebound or guarding  Extremities: WWP, no clubbing or cyanosis; 1+ pitting LE edema to mid-shins b/l, +varicose veins, chronic appearing skin changes  Musculoskeletal: NROM x4; no joint swelling, tenderness or erythema  Vascular: 2+ DP pulses  Neurologic: AAOx3; CNII-XII grossly intact; no focal deficits  Psychiatric: affect and characteristics of appearance, verbalizations, behaviors are appropriate    LABS:                        12.2   8.91  )-----------( 393      ( 20 Mar 2023 05:30 )             37.4     03-20    139  |  100  |  13  ----------------------------<  193<H>  4.0   |  25  |  0.53    Ca    9.4      20 Mar 2023 05:30  Phos  4.6     03-20  Mg     1.8     03-20    TPro  6.7  /  Alb  3.8  /  TBili  0.7  /  DBili  x   /  AST  27  /  ALT  36  /  AlkPhos  67  03-20    PT/INR - ( 19 Mar 2023 05:39 )   PT: 12.4 sec;   INR: 1.04          PTT - ( 19 Mar 2023 05:39 )  PTT:30.1 sec          Culture - Group A Streptococcus (collected 18 Mar 2023 11:44)  Source: Cult/Viral Throat  Preliminary Report (19 Mar 2023 10:36):    No beta hemolytic streptococci or Arcanobacterium haemolyticum isolated.    to date

## 2023-03-20 NOTE — PROGRESS NOTE ADULT - SUBJECTIVE AND OBJECTIVE BOX
STEPDOWN from CCU to TELEMETRY     Hospital Course: 48 y/o F, with no reported PMH, who presented to [facility] ED from outpatient provider, w/ 2 weeks of SOB and increased WOB w ambulation, found to have high degree AV block and LBBB, admitted to CCU for monitoring and acute heart failure management. CXR showed mild pulmonary edema, labs with no major electrolyte abnormalities and no anemia, TTE shows global LV dysfunction with LVEF~30%, normal cavity size and normal RV. EP consulted, not recommending pacemaker at this time.  Lyme C6 abs positive, ID consulted, started on empiric CTX. EBV abs c/w past infection. Cardiac MRI read pending. Pt now with 1st degree AV block x 3 days after initiating CTX treatment. Stable for stepdown to cards tele.    ROS Negative except as per Subjective and HPI  	  MEDICATIONS:  cefTRIAXone   IVPB 2000 milliGRAM(s) IV Intermittent every 24 hours  insulin lispro (ADMELOG) corrective regimen sliding scale   SubCutaneous Before meals and at bedtime  enoxaparin Injectable 40 milliGRAM(s) SubCutaneous every 12 hours    [PHYSICAL EXAM:  TELEMETRY:  T(C): 36.8 (03-20-23 @ 09:11), Max: 36.8 (03-20-23 @ 09:11)  HR: 72 (03-20-23 @ 11:00) (68 - 104)  BP: 148/83 (03-20-23 @ 10:18) (120/58 - 164/82)  RR: 25 (03-20-23 @ 11:00) (18 - 44)  SpO2: 98% (03-20-23 @ 10:18) (95% - 100%)  Wt(kg): --  I&O's Summary    19 Mar 2023 07:01  -  20 Mar 2023 07:00  --------------------------------------------------------  IN: 100 mL / OUT: 1600 mL / NET: -1500 mL                                    Appearance: Normal, obese, sitting comfortably in bed   HEENT:   Normal oral mucosa, PERRL, EOMI	  Neck: Supple, - JVD; Carotid Bruit   Cardiovascular: Normal S1 S2, No JVD, No murmurs,   Respiratory: + bibasilar rales 	  Gastrointestinal:  Soft, Non-tender, + BS	  Skin: No rashes, No ecchymoses, No cyanosis  Extremities: Normal range of motion, No clubbing, cyanosis or edema  Vascular: Peripheral pulses palpable 2+ bilaterally  Neurologic: Non-focal  Psychiatry: A & O x 3, Mood & affect appropriate      	    ECG:  	  RADIOLOGY:   DIAGNOSTIC TESTING:  [ ] Echocardiogram:  [ ]  Catheterization:  [ ] Stress Test:    [ ] CODI  OTHER: 	    LABS:	 	  CARDIAC MARKERS:                                  12.2   8.91  )-----------( 393      ( 20 Mar 2023 05:30 )             37.4     03-20    139  |  100  |  13  ----------------------------<  193<H>  4.0   |  25  |  0.53    Ca    9.4      20 Mar 2023 05:30  Phos  4.6     03-20  Mg     1.8     03-20    TPro  6.7  /  Alb  3.8  /  TBili  0.7  /  DBili  x   /  AST  27  /  ALT  36  /  AlkPhos  67  03-20    proBNP:   Lipid Profile:   HgA1c:   TSH:   PT/INR - ( 19 Mar 2023 05:39 )   PT: 12.4 sec;   INR: 1.04          PTT - ( 19 Mar 2023 05:39 )  PTT:30.1 sec    ASSESSMENT/PLAN: 	        DVT ppx:  Dispo:     STEPDOWN from CCU to TELEMETRY     Hospital Course: 48 y/o F, with no reported PMH, who presented to [facility] ED from outpatient provider, w/ 2 weeks of SOB and increased WOB w ambulation, found to have high degree AV block and LBBB, admitted to CCU for monitoring and acute heart failure management. CXR showed mild pulmonary edema, labs with no major electrolyte abnormalities and no anemia, TTE shows global LV dysfunction with LVEF~30%, normal cavity size and normal RV. EP consulted, not recommending pacemaker at this time.  Lyme C6 abs positive, ID consulted, started on empiric CTX. EBV abs c/w past infection. Cardiac MRI read pending. Pt now with 1st degree AV block x 3 days after initiating CTX treatment. Stable for stepdown to cards tele.    ROS Negative except as per Subjective and HPI  	  MEDICATIONS:  cefTRIAXone   IVPB 2000 milliGRAM(s) IV Intermittent every 24 hours  insulin lispro (ADMELOG) corrective regimen sliding scale   SubCutaneous Before meals and at bedtime  enoxaparin Injectable 40 milliGRAM(s) SubCutaneous every 12 hours    [PHYSICAL EXAM:  TELEMETRY:  T(C): 36.8 (03-20-23 @ 09:11), Max: 36.8 (03-20-23 @ 09:11)  HR: 72 (03-20-23 @ 11:00) (68 - 104)  BP: 148/83 (03-20-23 @ 10:18) (120/58 - 164/82)  RR: 25 (03-20-23 @ 11:00) (18 - 44)  SpO2: 98% (03-20-23 @ 10:18) (95% - 100%)  Wt(kg): --  I&O's Summary    19 Mar 2023 07:01  -  20 Mar 2023 07:00  --------------------------------------------------------  IN: 100 mL / OUT: 1600 mL / NET: -1500 mL                                    Appearance: Normal, obese, sitting comfortably in bed   HEENT:   Normal oral mucosa, PERRL, EOMI	  Neck: Supple, - JVD; Carotid Bruit   Cardiovascular: Normal S1 S2, No JVD, No murmurs,   Respiratory: + bibasilar rales 	  Gastrointestinal:  Soft, Non-tender, + BS	  Skin: No rashes, No ecchymoses, No cyanosis  Extremities: Normal range of motion, No clubbing, cyanosis or edema  Vascular: Peripheral pulses palpable 2+ bilaterally  Neurologic: Non-focal  Psychiatry: A & O x 3, Mood & affect appropriate      	    DIAGNOSTIC TESTING:  [x ] Echocardiogram 03/17/2023:    1. Moderately reduced left ventricular systolic function with global   hypokinesis.   2. Normal right ventricular size and systolic function.   3. No significant valvular disease.   4. Mild pulmonary hypertension.   5. No pericardial effusion.      LABS:             12.2   8.91  )-----------( 393      ( 20 Mar 2023 05:30 )             37.4     03-20    139  |  100  |  13  ----------------------------<  193<H>  4.0   |  25  |  0.53    Ca    9.4      20 Mar 2023 05:30  Phos  4.6     03-20  Mg     1.8     03-20    TPro  6.7  /  Alb  3.8  /  TBili  0.7  /  DBili  x   /  AST  27  /  ALT  36  /  AlkPhos  67  03-20    proBNP: 2421  Lipid Profile: total cholesterol: 155, LDL: 95  HDL: 32   HgA1c: 9.8  TSH: 7.150  PT/INR - ( 19 Mar 2023 05:39 )   PT: 12.4 sec;   INR: 1.04     PTT - ( 19 Mar 2023 05:39 )  PTT:30.1 sec

## 2023-03-20 NOTE — PROGRESS NOTE ADULT - PROBLEM SELECTOR PLAN 4
Lyme C6 ab (+). ID Consulted  - Patient's cardiac MRI with findings consistent with myocarditis  - Patient w/ Type 2 AV block on ekg and prolonged DC interval   - C/w CTX 2g QD for 14 days (last day April 1st)  - pt will likely need mid-line for home infusions  - f/u esr/crp    F: none   E: K >4, Mg >2  N: DASH, TLC, fluid restriction   DVT: pt refusing lovenox   Dispo: pending workup     Case discussed with Dr. Rushing

## 2023-03-20 NOTE — PROGRESS NOTE ADULT - ASSESSMENT
48 y/o F, with no reported PMH, who presented to [facility] ED from outpatient provider, w/ 2 weeks of SOB and increased WOB w ambulation, found to have high degree AV block and LBBB, admitted to CCU for monitoring and acute heart failure management. CXR showed mild pulmonary edema, labs with no major electrolyte abnormalities and no anemia, TTE shows global LV dysfunction with LVEF~30%, normal cavity size and normal RV. EP consulted, not recommending pacemaker at this time.  Lyme C6 abs positive, ID consulted, started on empiric CTX. EBV abs c/w past infection. Cardiac MRI read pending. Pt now with 1st degree AV block x 3 days after initiating CTX treatment. Stable for stepdown to [facility] tele.

## 2023-03-20 NOTE — PROGRESS NOTE ADULT - ASSESSMENT
Assessment:  46 y/o F, with no significant PMH, who presented to [facility] ED from outpatient provider, w/ 2 weeks of SOB and increased WOB w ambulation, found to have high degree AV block and LBBB, admitted to CCU for monitoring & further evaluation for likely myocarditis.     NEURO  TRA     PULM  #PEREIRA/SOB--RESOLVED  Pt reports 1 week hx of URI symptoms which have since resolved. Respiratory symptoms improving after IV diuresis, with stable CXR and physical exam  - s/p diuresis as below    CARDIOVASCULAR  #High-degree AV Block  2:1 AV block w LBBB likely 2/2 myocarditis likely 2/2 infection given history of viral like symptoms (2 wk hx of cough, fever PEREIRA)  vs ischemia vs infiltrative disease.   Bedside TTE was poor quality but showed no effusion and likely slightly reduced EF.   EKG w 2:1 AV block. S/p 20 IV lasix w good UOP. Pacer pads on w atropine at bedside + telemetry.   TTE 3/17: moderately reduced LVSF EF 38% with global hypokinesis and normal RV size/function  Lyme titers 3/17: (+) Lyme C6 ab.     Rhythm on 3/19 AM 1st degree AV Block w/ infrequent PVCs, LBBB.   - cMRI performed, pending read  - no further diuresis  - d/c losartan 25mg OD (pt refusing anti-HTN meds at this time)  - f/u Lyme Western Blot    ENDO  No known hx DM, however glucose 234   A1c 9.8  - Sliding Scale  - patient refusing insulin at this time    GI  TRA    RENAL  TRA    HEME  TRA    ID  #rule-out Lyme Disease  Lyme C6 ab (+). ID Consulted  - c/w CTX 2g QD for 14 days (last day April 1st)  - pt will likely need mid-line for home infusions  - f/u Lyme Western Blot  - ID consulted, f/u recs     MISC  F: None   E: Replete PRN   N: DASH/TLC   DVT ppx: pt refusing lovenox  Dispo: CCU       Assessment:  46 y/o F, with no significant PMH, who presented to 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     NEURO  TRA     PULM  #PEREIRA/SOB--RESOLVED  Pt reports 1 week hx of URI symptoms which have since resolved. Respiratory symptoms improving after IV diuresis, with stable CXR and physical exam  - s/p diuresis as below    CARDIOVASCULAR  #High-degree AV Block  2:1 AV block w LBBB likely 2/2 myocarditis likely 2/2 infection given history of viral like symptoms (2 wk hx of cough, fever PEREIRA)  vs ischemia vs infiltrative disease.   Bedside TTE was poor quality but showed no effusion and likely slightly reduced EF.   EKG w 2:1 AV block. S/p 20 IV lasix w good UOP. Pacer pads on w atropine at bedside + telemetry.   TTE 3/17: moderately reduced LVSF EF 38% with global hypokinesis and normal RV size/function    Lyme titers 3/17: (+) Lyme C6 ab.  EBV labs c/w past infection.    Rhythm on 3/20 AM 1st degree AV Block, LBBB.   - cMRI performed, pending read  - no further diuresis  - d/c losartan 25mg OD (pt refusing anti-HTN meds at this time)  - f/u Lyme Western Blot    ENDO  No known hx DM, however glucose 234   A1c 9.8  - Sliding Scale  - patient refusing insulin at this time    GI  TRA    RENAL  TRA    HEME  TRA    ID  #rule-out Lyme Disease  Lyme C6 ab (+). ID Consulted  - c/w CTX 2g QD for 14 days (last day April 1st)  - pt will likely need mid-line for home infusions  - f/u Lyme Western Blot  - ID consulted, f/u recs     MISC  F: None   E: Replete PRN   N: DASH/TLC   DVT ppx: pt refusing lovenox  Dispo: CCU

## 2023-03-20 NOTE — PROGRESS NOTE ADULT - SUBJECTIVE AND OBJECTIVE BOX
INFECTIOUS DISEASES CONSULT FOLLOW-UP NOTE    INTERVAL HPI/OVERNIGHT EVENTS: Refused vitals checks, anti-HTN meds. EKG showed 1st degree AVB + LBBB, improving. No plan for PPM. Strep throat negative. EBV serologies positive. No complaints, ROS negative    ROS:   Constitutional, eyes, ENT, cardiovascular, respiratory, gastrointestinal, genitourinary, integumentary, neurological, psychiatric and heme/lymph are otherwise negative other than noted above     ANTIBIOTICS/RELEVANT:    MEDICATIONS  (STANDING):  cefTRIAXone   IVPB 2000 milliGRAM(s) IV Intermittent every 24 hours  enoxaparin Injectable 40 milliGRAM(s) SubCutaneous every 12 hours  insulin lispro (ADMELOG) corrective regimen sliding scale   SubCutaneous Before meals and at bedtime    MEDICATIONS  (PRN):    Vital Signs Last 24 Hrs  T(C): 36.2 (20 Mar 2023 13:53), Max: 36.8 (20 Mar 2023 09:11)  T(F): 97.2 (20 Mar 2023 13:53), Max: 98.2 (20 Mar 2023 09:11)  HR: 76 (20 Mar 2023 16:27) (68 - 104)  BP: 118/53 (20 Mar 2023 16:27) (118/53 - 164/82)  BP(mean): 109 (20 Mar 2023 10:18) (82 - 116)  RR: 20 (20 Mar 2023 16:27) (18 - 29)  SpO2: 95% (20 Mar 2023 16:27) (95% - 100%)    Parameters below as of 20 Mar 2023 16:27  Patient On (Oxygen Delivery Method): room air    03-19-23 @ 07:01  -  03-20-23 @ 07:00  --------------------------------------------------------  IN: 100 mL / OUT: 1600 mL / NET: -1500 mL    03-20-23 @ 07:01  -  03-20-23 @ 17:57  --------------------------------------------------------  IN: 180 mL / OUT: 0 mL / NET: 180 mL    PHYSICAL EXAM:  Constitutional: obese, middle aged female, resting comfortably in bed, NAD  HEENT: NC/AT, PER, anicteric sclera, no nasal discharge; MMM  Neck: supple; no JVD or thyromegaly, no lymphadenopathy  Respiratory: CTA B/L; no W/R/R, no retractions  Cardiac: +S1/S2; regular rate, irregular rhythm, no audible murmurs  Gastrointestinal: soft, round, NT/ND; no rebound or guarding  Extremities: WWP, no clubbing or cyanosis; 1+ pitting LE edema to mid-shins b/l, +varicose veins, chronic appearing skin changes  Musculoskeletal: NROM x4; no joint swelling, tenderness or erythema  Vascular: 2+ DP pulses  Neurologic: AAOx3; CNII-XII grossly intact; no focal deficits  Psychiatric: affect and characteristics of appearance, verbalizations, behaviors are appropriate    LABS:                        12.2   8.91  )-----------( 393      ( 20 Mar 2023 05:30 )             37.4     03-20    139  |  100  |  13  ----------------------------<  193<H>  4.0   |  25  |  0.53    Ca    9.4      20 Mar 2023 05:30  Phos  4.6     03-20  Mg     1.8     03-20    TPro  6.7  /  Alb  3.8  /  TBili  0.7  /  DBili  x   /  AST  27  /  ALT  36  /  AlkPhos  67  03-20    PT/INR - ( 19 Mar 2023 05:39 )   PT: 12.4 sec;   INR: 1.04       PTT - ( 19 Mar 2023 05:39 )  PTT:30.1 sec    MICROBIOLOGY:    RADIOLOGY & ADDITIONAL STUDIES:  Reviewed

## 2023-03-21 DIAGNOSIS — E03.9 HYPOTHYROIDISM, UNSPECIFIED: ICD-10-CM

## 2023-03-21 LAB
ALBUMIN SERPL ELPH-MCNC: 3.6 G/DL — SIGNIFICANT CHANGE UP (ref 3.3–5)
ALP SERPL-CCNC: 64 U/L — SIGNIFICANT CHANGE UP (ref 40–120)
ALT FLD-CCNC: 33 U/L — SIGNIFICANT CHANGE UP (ref 10–45)
ANION GAP SERPL CALC-SCNC: 12 MMOL/L — SIGNIFICANT CHANGE UP (ref 5–17)
APTT BLD: 30 SEC — SIGNIFICANT CHANGE UP (ref 27.5–35.5)
AST SERPL-CCNC: 24 U/L — SIGNIFICANT CHANGE UP (ref 10–40)
B BURGDOR DNA SPEC QL NAA+PROBE: NEGATIVE — SIGNIFICANT CHANGE UP
BASOPHILS # BLD AUTO: 0.02 K/UL — SIGNIFICANT CHANGE UP (ref 0–0.2)
BASOPHILS NFR BLD AUTO: 0.2 % — SIGNIFICANT CHANGE UP (ref 0–2)
BILIRUB SERPL-MCNC: 0.5 MG/DL — SIGNIFICANT CHANGE UP (ref 0.2–1.2)
BUN SERPL-MCNC: 11 MG/DL — SIGNIFICANT CHANGE UP (ref 7–23)
CALCIUM SERPL-MCNC: 9.1 MG/DL — SIGNIFICANT CHANGE UP (ref 8.4–10.5)
CARDIOLIPIN IGM SER-MCNC: 35.3 MPL — HIGH (ref 0–12.5)
CARDIOLIPIN IGM SER-MCNC: <5 GPL — SIGNIFICANT CHANGE UP (ref 0–12.5)
CHLORIDE SERPL-SCNC: 101 MMOL/L — SIGNIFICANT CHANGE UP (ref 96–108)
CO2 SERPL-SCNC: 25 MMOL/L — SIGNIFICANT CHANGE UP (ref 22–31)
CREAT SERPL-MCNC: 0.56 MG/DL — SIGNIFICANT CHANGE UP (ref 0.5–1.3)
DEPRECATED CARDIOLIPIN IGA SER: <5 APL — SIGNIFICANT CHANGE UP (ref 0–12.5)
DSDNA AB SER-ACNC: <12 IU/ML — SIGNIFICANT CHANGE UP
EGFR: 113 ML/MIN/1.73M2 — SIGNIFICANT CHANGE UP
EOSINOPHIL # BLD AUTO: 0.27 K/UL — SIGNIFICANT CHANGE UP (ref 0–0.5)
EOSINOPHIL NFR BLD AUTO: 3.2 % — SIGNIFICANT CHANGE UP (ref 0–6)
GLUCOSE BLDC GLUCOMTR-MCNC: 168 MG/DL — HIGH (ref 70–99)
GLUCOSE BLDC GLUCOMTR-MCNC: 175 MG/DL — HIGH (ref 70–99)
GLUCOSE BLDC GLUCOMTR-MCNC: 186 MG/DL — HIGH (ref 70–99)
GLUCOSE SERPL-MCNC: 186 MG/DL — HIGH (ref 70–99)
HCT VFR BLD CALC: 38.6 % — SIGNIFICANT CHANGE UP (ref 34.5–45)
HGB BLD-MCNC: 12.3 G/DL — SIGNIFICANT CHANGE UP (ref 11.5–15.5)
IMM GRANULOCYTES NFR BLD AUTO: 0.4 % — SIGNIFICANT CHANGE UP (ref 0–0.9)
INR BLD: 1.04 — SIGNIFICANT CHANGE UP (ref 0.88–1.16)
LYMPHOCYTES # BLD AUTO: 2.33 K/UL — SIGNIFICANT CHANGE UP (ref 1–3.3)
LYMPHOCYTES # BLD AUTO: 27.4 % — SIGNIFICANT CHANGE UP (ref 13–44)
MAGNESIUM SERPL-MCNC: 1.8 MG/DL — SIGNIFICANT CHANGE UP (ref 1.6–2.6)
MCHC RBC-ENTMCNC: 29.9 PG — SIGNIFICANT CHANGE UP (ref 27–34)
MCHC RBC-ENTMCNC: 31.9 GM/DL — LOW (ref 32–36)
MCV RBC AUTO: 93.9 FL — SIGNIFICANT CHANGE UP (ref 80–100)
MONOCYTES # BLD AUTO: 0.59 K/UL — SIGNIFICANT CHANGE UP (ref 0–0.9)
MONOCYTES NFR BLD AUTO: 6.9 % — SIGNIFICANT CHANGE UP (ref 2–14)
NEUTROPHILS # BLD AUTO: 5.27 K/UL — SIGNIFICANT CHANGE UP (ref 1.8–7.4)
NEUTROPHILS NFR BLD AUTO: 61.9 % — SIGNIFICANT CHANGE UP (ref 43–77)
NRBC # BLD: 0 /100 WBCS — SIGNIFICANT CHANGE UP (ref 0–0)
PHOSPHATE SERPL-MCNC: 4.2 MG/DL — SIGNIFICANT CHANGE UP (ref 2.5–4.5)
PLATELET # BLD AUTO: 369 K/UL — SIGNIFICANT CHANGE UP (ref 150–400)
POTASSIUM SERPL-MCNC: 4 MMOL/L — SIGNIFICANT CHANGE UP (ref 3.5–5.3)
POTASSIUM SERPL-SCNC: 4 MMOL/L — SIGNIFICANT CHANGE UP (ref 3.5–5.3)
PROT SERPL-MCNC: 6.6 G/DL — SIGNIFICANT CHANGE UP (ref 6–8.3)
PROTHROM AB SERPL-ACNC: 12.4 SEC — SIGNIFICANT CHANGE UP (ref 10.5–13.4)
RBC # BLD: 4.11 M/UL — SIGNIFICANT CHANGE UP (ref 3.8–5.2)
RBC # FLD: 16.2 % — HIGH (ref 10.3–14.5)
SODIUM SERPL-SCNC: 138 MMOL/L — SIGNIFICANT CHANGE UP (ref 135–145)
STRONGYLOIDES AB SER-ACNC: NEGATIVE — SIGNIFICANT CHANGE UP
WBC # BLD: 8.51 K/UL — SIGNIFICANT CHANGE UP (ref 3.8–10.5)
WBC # FLD AUTO: 8.51 K/UL — SIGNIFICANT CHANGE UP (ref 3.8–10.5)

## 2023-03-21 PROCEDURE — 99232 SBSQ HOSP IP/OBS MODERATE 35: CPT

## 2023-03-21 PROCEDURE — 99222 1ST HOSP IP/OBS MODERATE 55: CPT

## 2023-03-21 RX ORDER — LEVOTHYROXINE SODIUM 125 MCG
25 TABLET ORAL DAILY
Refills: 0 | Status: DISCONTINUED | OUTPATIENT
Start: 2023-03-21 | End: 2023-03-22

## 2023-03-21 RX ORDER — ISOPROPYL ALCOHOL, BENZOCAINE .7; .06 ML/ML; ML/ML
1 SWAB TOPICAL
Qty: 100 | Refills: 1
Start: 2023-03-21 | End: 2023-05-09

## 2023-03-21 RX ORDER — MAGNESIUM OXIDE 400 MG ORAL TABLET 241.3 MG
800 TABLET ORAL ONCE
Refills: 0 | Status: COMPLETED | OUTPATIENT
Start: 2023-03-21 | End: 2023-03-21

## 2023-03-21 RX ORDER — VALSARTAN 80 MG/1
40 TABLET ORAL DAILY
Refills: 0 | Status: DISCONTINUED | OUTPATIENT
Start: 2023-03-21 | End: 2023-03-22

## 2023-03-21 RX ORDER — DAPAGLIFLOZIN 10 MG/1
10 TABLET, FILM COATED ORAL EVERY 24 HOURS
Refills: 0 | Status: DISCONTINUED | OUTPATIENT
Start: 2023-03-21 | End: 2023-03-22

## 2023-03-21 RX ADMIN — CEFTRIAXONE 100 MILLIGRAM(S): 500 INJECTION, POWDER, FOR SOLUTION INTRAMUSCULAR; INTRAVENOUS at 14:06

## 2023-03-21 NOTE — PROGRESS NOTE ADULT - PROBLEM SELECTOR PLAN 4
Lyme C6 ab (+). ID Consulted  - Patient's cardiac MRI with findings consistent with myocarditis  - Patient w/ Type 2 AV block on ekg and prolonged TN interval   - C/w CTX 2g QD for 14 days (last day April 1st)  - pt will likely need mid-line for home infusions  - f/u esr/crp    F: none   E: K >4, Mg >2  N: DASH, TLC, fluid restriction   DVT: pt refusing lovenox   Dispo: pending workup     Case discussed with Dr. Rushing Lyme C6 ab (+). ID Consulted  - Patient's cardiac MRI with findings consistent with myocarditis  - Patient w/ Type 2 AV block on ekg and prolonged NE interval   - C/w CTX 2g QD for 14 days (last day April 1st)  - As per ID, if lyme +,  will transition to Doxy 100 mg BID x 3 weeks     F: none   E: K >4, Mg >2  N: DASH, TLC, fluid restriction   DVT: pt refusing lovenox   Dispo: pending Lyme Western blot and endo recs     Case discussed with Dr. Rushing

## 2023-03-21 NOTE — PROGRESS NOTE ADULT - PROBLEM SELECTOR PLAN 2
ECHO 03/17/2023: Moderately reduced LVSF, EF: 38%, with global hypokinesis and normal RV size/function  - s/p lasix 20 mg IVP x 1 and Lasix 40 mg IVP x 1, has not required additional Lasix since  - No further diuresis needed at this time ECHO 03/17/2023: Moderately reduced LVSF, EF: 38%, with global hypokinesis and normal RV size/function  - s/p lasix 20 mg IVP x 1 and Lasix 40 mg IVP x 1, has not required additional Lasix since  - No further diuresis needed at this time  - Will start Valsartan 40 mg once daily and Farxiga 10 mg daily

## 2023-03-21 NOTE — CONSULT NOTE ADULT - PROBLEM SELECTOR RECOMMENDATION 9
Please continue lantus       units at night / morning.  Please continue lispro      units before each meal.  Please continue lispro moderate / low dose sliding scale four times daily with meals and at bedtime    Pt's fingerstick glucose goal is     Will continue to monitor     For discharge, pt can continue    Pt can follow up at discharge with [facility] by calling  to make an appointment.   Will discuss case with     and update primary team Please continue lispro moderate dose sliding scale four times daily with meals and at bedtime. She can decline insulin.  She is agreeable to monitoring FSG at home, with the idea that between starting farxiga and diet modifications she will be able to control glucose. If glucose remain above target with these changes, she will seek out assistance of MD.  Test supplies sent to [facility].    Pt's fingerstick glucose goal is 100-180    Will continue to monitor       Pt can follow up at discharge with [facility] by calling  to make an appointment.   Will discuss case with Dr. Shipley  and update primary team

## 2023-03-21 NOTE — PROGRESS NOTE ADULT - SUBJECTIVE AND OBJECTIVE BOX
Interventional Cardiology PA Adult Progress Note    Subjective Assessment:    Patient seen and examined at bedside. Endorsing SOB with minimal exertion, polydipsia, and polyuria. Patient has multiple questions today regarding why her fingersticks have been elevated to 180s. Explained to patient her A1C is elevated 9.8 which indicates she has DM and that she should start medication for better glucose control.   Denies fever, chills, chest pain, nausea, vomiting, numbness/tingling, or LE swelling.   	  MEDICATIONS:    cefTRIAXone   IVPB 2000 milliGRAM(s) IV Intermittent every 24 hours  insulin lispro (ADMELOG) corrective regimen sliding scale   SubCutaneous Before meals and at bedtime  enoxaparin Injectable 40 milliGRAM(s) SubCutaneous every 12 hours  magnesium oxide 800 milliGRAM(s) Oral once      [PHYSICAL EXAM:  TELEMETRY:  T(C): 36.7 (03-21-23 @ 08:49), Max: 36.7 (03-21-23 @ 08:49)  HR: 83 (03-21-23 @ 09:28) (72 - 83)  BP: 164/83 (03-21-23 @ 09:28) (118/53 - 164/83)  RR: 20 (03-21-23 @ 09:28) (18 - 20)  SpO2: 95% (03-21-23 @ 09:28) (95% - 97%)  Wt(kg): --  I&O's Summary    20 Mar 2023 07:01  -  21 Mar 2023 07:00  --------------------------------------------------------  IN: 420 mL / OUT: 0 mL / NET: 420 mL    21 Mar 2023 07:01  -  21 Mar 2023 13:31  --------------------------------------------------------  IN: 180 mL / OUT: 0 mL / NET: 180 mL                            Appearance: sitting upright in chair, obese 	  Cardiovascular: Normal S1 S2    Respiratory: Lungs clear to auscultation  Gastrointestinal:  Soft, Non-tender, + BS	  Skin: No rashes, No ecchymoses, No cyanosis  Extremities: No LE edema   Vascular: Peripheral pulses palpable 2+ bilaterally  Neurologic: Non-focal  Psychiatry: A & O x 3, Mood & affect appropriate      	              12.3   8.51  )-----------( 369      ( 21 Mar 2023 06:59 )             38.6     03-21    138  |  101  |  11  ----------------------------<  186<H>  4.0   |  25  |  0.56    Ca    9.1      21 Mar 2023 06:59  Phos  4.2     03-21  Mg     1.8     03-21    TPro  6.6  /  Alb  3.6  /  TBili  0.5  /  DBili  x   /  AST  24  /  ALT  33  /  AlkPhos  64  03-21       Interventional Cardiology PA Adult Progress Note    Subjective Assessment:    Patient seen and examined at bedside. Endorsing SOB with minimal exertion, polydipsia, and polyuria. Patient has multiple questions today regarding why her fingersticks have been elevated to 180s. Explained to patient her A1C is elevated 9.8 which indicates she has DM and that she should start medication for better glucose control. Denies fever, chills, chest pain, nausea, vomiting, numbness/tingling, or LE swelling.   	  MEDICATIONS:    cefTRIAXone   IVPB 2000 milliGRAM(s) IV Intermittent every 24 hours  insulin lispro (ADMELOG) corrective regimen sliding scale   SubCutaneous Before meals and at bedtime  enoxaparin Injectable 40 milliGRAM(s) SubCutaneous every 12 hours  magnesium oxide 800 milliGRAM(s) Oral once      [PHYSICAL EXAM:  TELEMETRY:  T(C): 36.7 (03-21-23 @ 08:49), Max: 36.7 (03-21-23 @ 08:49)  HR: 83 (03-21-23 @ 09:28) (72 - 83)  BP: 164/83 (03-21-23 @ 09:28) (118/53 - 164/83)  RR: 20 (03-21-23 @ 09:28) (18 - 20)  SpO2: 95% (03-21-23 @ 09:28) (95% - 97%)  Wt(kg): --  I&O's Summary    20 Mar 2023 07:01  -  21 Mar 2023 07:00  --------------------------------------------------------  IN: 420 mL / OUT: 0 mL / NET: 420 mL    21 Mar 2023 07:01  -  21 Mar 2023 13:31  --------------------------------------------------------  IN: 180 mL / OUT: 0 mL / NET: 180 mL                            Appearance: sitting upright in chair, obese 	  Cardiovascular: Normal S1 S2    Respiratory: Lungs clear to auscultation  Gastrointestinal:  Soft, Non-tender, + BS	  Skin: No rashes, No ecchymoses, No cyanosis  Extremities: No LE edema   Vascular: Peripheral pulses palpable 2+ bilaterally  Neurologic: Non-focal  Psychiatry: A & O x 3, Mood & affect appropriate      	              12.3   8.51  )-----------( 369      ( 21 Mar 2023 06:59 )             38.6     03-21    138  |  101  |  11  ----------------------------<  186<H>  4.0   |  25  |  0.56    Ca    9.1      21 Mar 2023 06:59  Phos  4.2     03-21  Mg     1.8     03-21    TPro  6.6  /  Alb  3.6  /  TBili  0.5  /  DBili  x   /  AST  24  /  ALT  33  /  AlkPhos  64  03-21

## 2023-03-21 NOTE — PROGRESS NOTE ADULT - ASSESSMENT
46 y/o F, with no reported PMH, who presented to [facility] ED from outpatient provider, w/ 2 weeks of SOB and increased WOB w ambulation, found to have high degree AV block and LBBB, admitted to CCU for monitoring and acute heart failure management. CXR showed mild pulmonary edema, labs with no major electrolyte abnormalities and no anemia, TTE shows global LV dysfunction with LVEF~30%, normal cavity size and normal RV. EP consulted, not recommending pacemaker at this time.  Lyme C6 abs positive, ID consulted, started on empiric CTX. EBV abs c/w past infection. Cardiac MRI read pending. Pt now with 1st degree AV block x 3 days after initiating CTX treatment. Stable for stepdown to [facility] tele. 48 y/o F, with no reported PMH, who presented to [facility] ED from outpatient provider, w/ 2 weeks of SOB and increased WOB w ambulation, found to have high degree AV block and LBBB, admitted to CCU for monitoring and acute heart failure management. CXR showed mild pulmonary edema, labs with no major electrolyte abnormalities and no anemia, TTE shows global LV dysfunction with LVEF~30%, normal cavity size and normal RV. EP consulted, not recommending pacemaker at this time.  Lyme C6 abs positive, ID consulted, started on empiric CTX. EBV abs c/w past infection. Cardiac MRI w/ findings supporting myocarditis. 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 Patient also to be seen by endo for newly diagnosed diabetes and started on GDMT for HFrEF.

## 2023-03-21 NOTE — DIETITIAN INITIAL EVALUATION ADULT - ADD RECOMMEND
1. Monitor PO intake/appetite, GI distress, diet tolerance, labs, weights.  2. Honor pt food preferences as able.  3. Endo vs CDE Consult.  4. RD to remain available for additional nutrition interventions as needed.   * Moderate Nutrition Risk.

## 2023-03-21 NOTE — CONSULT NOTE ADULT - TIME BILLING
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
Discussion re: options for treatment of DM, initiation of thyroid hormone replacement

## 2023-03-21 NOTE — DIETITIAN INITIAL EVALUATION ADULT - OTHER CALCULATIONS
5'2''  pounds +-10%  Wt 270 pounds BMI 29.3 %[id number]  upper- IBW used to calculate energy needs due to pt's current body weight exceeding 120% of IBW   adjust for age/BMI, EF; fluids per team

## 2023-03-21 NOTE — DIETITIAN INITIAL EVALUATION ADULT - NAME AND PHONE
- Yes, [phone]   - Pt would benefit from further Medical Nutrition Therapy after discharge. Recommend to follow up with [facility] for continuity of care. Email [email or web address] or call [phone].

## 2023-03-21 NOTE — CONSULT NOTE ADULT - NS ATTEND AMEND GEN_ALL_CORE FT
Pt seen on rounds this afternoon.  47-yo woman admitted for treatment of CHF and evaluation of heart block with possible need for PPM.  Has no history of DM, but has not seen physicians in approximately 2 years.  Glucoses have been in the high 100 range since admission, but she has been refusing correction doses of insulin.  AS noted above, pt has nutritional training and wants to manage her diabetes initially with only dietary modification.  Review of diet indicates that intake of juice and other sweetened drinks has likely contributed to the hyperglycemia.  She was also noted to have sub-clinical hypothyroidism, with free T4 1.3 ng/dl, TSH elevated to 7.2 uU/ml.  She has no thyroid enlargement on exam.  --With glucoses only moderately elevated and stable, will hold off starting any standing medication--she clearly does not want insulin  --TFTs likely reflect underlying Hashimoto's disease, and pt is agreeable to starting a small dose of levothyroxine--will start 25 mcg/day (though we indicated to her that her mild hypothyroidism is not likely contributing to her difficulty losing weight)

## 2023-03-21 NOTE — DIETITIAN INITIAL EVALUATION ADULT - PERTINENT LABORATORY DATA
03-21    138  |  101  |  11  ----------------------------<  186<H>  4.0   |  25  |  0.56    Ca    9.1      21 Mar 2023 06:59  Phos  4.2     03-21  Mg     1.8     03-21    TPro  6.6  /  Alb  3.6  /  TBili  0.5  /  DBili  x   /  AST  24  /  ALT  33  /  AlkPhos  64  03-21  POCT Blood Glucose.: 175 mg/dL (03-21-23 @ 11:44)  A1C with Estimated Average Glucose Result: 9.8 % (03-17-23 @ 05:27)

## 2023-03-21 NOTE — CONSULT NOTE ADULT - SUBJECTIVE AND OBJECTIVE BOX
HPI: 48 y/o F, with no reported PMH, who presented to [facility] ED from outpatient provider, w/ 2 weeks of SOB and increased WOB w ambulation, found to have high degree AV block and LBBB, admitted to CCU for monitoring and acute heart failure management. CXR showed mild pulmonary edema, labs with no major electrolyte abnormalities and no anemia, TTE shows global LV dysfunction with LVEF~30%, normal cavity size and normal RV. EP consulted, not recommending pacemaker at this time.  Lyme C6 abs positive, ID consulted, started on empiric CTX. EBV abs c/w past infection. Cardiac MRI read pending. Pt now with 1st degree AV block x 3 days after initiating CTX treatment. Stable for stepdown to [facility].    FSG & insulin:    Dinner FSG   Lispro   +    Ate  Bedtime FSG     Lantus      and Lispro         Breakfast FSG   Lispro    +    Ate  Lunch FSG      Lispro    +    Ate      Age at Dx:  How dx:  Hx and duration of insulin:  Current Therapy:  Hx of other regimens:    Home FSG:  Fasting  Lunch  Dinner  Bed    Diet:  Exercise:    Hx of hypoglycemia:  Hx of DKA/HHS:  Complications:  Outpatient Endo:    FH:  DM:  Thyroid:  Autoimmune:  Other:    SH:  Smoking  Etoh:  Recreational Drugs:  Social Life:    Current Meds:  cefTRIAXone   IVPB 2000 milliGRAM(s) IV Intermittent every 24 hours  enoxaparin Injectable 40 milliGRAM(s) SubCutaneous every 12 hours  insulin lispro (ADMELOG) corrective regimen sliding scale   SubCutaneous Before meals and at bedtime  magnesium oxide 800 milliGRAM(s) Oral once      Allergies:  No Known Allergies      ROS:  Denies the following except as indicated.    General: weight loss/weight gain, decreased appetite, fatigue  Eyes: Blurry vision, double vision, visual changes  ENT: Throat pain, changes in voice,   CV: palpitations, SOB, CP, cough  GI: NVD, difficulty swallowing, abdominal pain  : polyuria, dysuria  Endo: abnormal menses, temperature intolerance, decreased libido  MSK: weakness, joint pain  Skin: rash, dryness, diaphoresis  Heme: Easy bruising, bleeding  Neuro: HA, dizziness, lightheadedness, numbness/ tingling  Psych: Anxiety, Depression    Vital Signs Last 24 Hrs  T(C): 36 (21 Mar 2023 13:43), Max: 36.7 (21 Mar 2023 08:49)  T(F): 96.8 (21 Mar 2023 13:43), Max: 98.1 (21 Mar 2023 08:49)  HR: 83 (21 Mar 2023 09:28) (72 - 83)  BP: 164/83 (21 Mar 2023 09:28) (118/53 - 164/83)  BP(mean): --  RR: 20 (21 Mar 2023 09:28) (18 - 20)  SpO2: 95% (21 Mar 2023 09:28) (95% - 97%)    Parameters below as of 21 Mar 2023 09:28  Patient On (Oxygen Delivery Method): room air      Height (cm): 157.5 (03-16 @ 15:43)  Weight (kg): 122.5 (03-16 @ 15:43)  BMI (kg/m2): 49.4 (03-16 @ 15:43)      Constitutional: wn/wd in NAD.   HEENT: NCAT, MMM, OP clear, EOMI, , no proptosis or lid retraction  Neck: no thyromegaly or palpable thyroid nodules   Respiratory: lungs CTAB.  Cardiovascular: regular rhythm, normal S1 and S2, no audible murmurs, no peripheral edema  GI: soft, NT/ND, no masses/HSM appreciated.  Neurology: no tremors, DTR 2+  Skin: no visible rashes/lesions. no acanthosis nigricans. no hyperlipotrophy. no cushing's stigmata.  Psychiatric: AAO x 3, normal affect/mood.  Ext: radial pulses intact, DP pulses intact, extremities warm, no cyanosis, clubbing or edema.       LABS:                        12.3   8.51  )-----------( 369      ( 21 Mar 2023 06:59 )             38.6     03-21    138  |  101  |  11  ----------------------------<  186<H>  4.0   |  25  |  0.56    Ca    9.1      21 Mar 2023 06:59  Phos  4.2     03-21  Mg     1.8     03-21    TPro  6.6  /  Alb  3.6  /  TBili  0.5  /  DBili  x   /  AST  24  /  ALT  33  /  AlkPhos  64  03-21    PT/INR - ( 21 Mar 2023 06:59 )   PT: 12.4 sec;   INR: 1.04          PTT - ( 21 Mar 2023 06:59 )  PTT:30.0 sec      Thyroid Stimulating Hormone, Serum: 7.150 (03-16 @ 13:04)    CAPILLARY BLOOD GLUCOSE  POCT Blood Glucose.: 175 mg/dL (21 Mar 2023 11:44)  POCT Blood Glucose.: 186 mg/dL (21 Mar 2023 06:56)  POCT Blood Glucose.: 190 mg/dL (20 Mar 2023 22:01)  POCT Blood Glucose.: 187 mg/dL (20 Mar 2023 16:49)     HPI: 48 y/o F, with no reported PMH, who presented to [facility] ED from outpatient provider, w/ 2 weeks of SOB and increased WOB w ambulation, found to have high degree AV block and LBBB, admitted to CCU for monitoring and acute heart failure management. CXR showed mild pulmonary edema, labs with no major electrolyte abnormalities and no anemia, TTE shows global LV dysfunction with LVEF~30%, normal cavity size and normal RV. EP consulted, not recommending pacemaker at this time.  Lyme C6 abs positive, ID consulted, started on empiric CTX. EBV abs c/w past infection. Cardiac MRI read pending. Pt now with 1st degree AV block x 3 days after initiating CTX treatment. Also found to have A1C of 9.8% and new T2DM diagnosis.    This is a new diabetes diagnosis though she has not seen a MD for 2 years. She has a family history of diabetes. Here fasting -180 and premeal -190. She is declining insulin correctional SS. She is hesitant to start any diabetes medications but has been started on farxiga by cardiology. She will eliminate juice and change her energy drinks to sugar free.  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  She reports that she has been unable to lose weight. TSH was elevated to 7.15 and free T4 1.3. She does not have a history of hypothyroidism She has had a thyroid US in the past with no nodules.    Current Meds:  cefTRIAXone   IVPB 2000 milliGRAM(s) IV Intermittent every 24 hours  enoxaparin Injectable 40 milliGRAM(s) SubCutaneous every 12 hours  insulin lispro (ADMELOG) corrective regimen sliding scale   SubCutaneous Before meals and at bedtime  magnesium oxide 800 milliGRAM(s) Oral once      Allergies:  No Known Allergies      ROS:  Denies the following except as indicated.    General: weight loss/weight gain, decreased appetite, + fatigue  Eyes: Blurry vision, double vision, visual changes  ENT: Throat pain, changes in voice,   CV: palpitations, SOB, CP, cough  GI: NVD, difficulty swallowing, abdominal pain  : polyuria, dysuria  Endo:  temperature intolerance  MSK: weakness, joint pain  Skin: rash, dryness, diaphoresis  Heme: Easy bruising, bleeding  Neuro: HA, dizziness, lightheadedness, numbness/ tingling  Psych: Anxiety, Depression    Vital Signs Last 24 Hrs  T(C): 36 (21 Mar 2023 13:43), Max: 36.7 (21 Mar 2023 08:49)  T(F): 96.8 (21 Mar 2023 13:43), Max: 98.1 (21 Mar 2023 08:49)  HR: 83 (21 Mar 2023 09:28) (72 - 83)  BP: 164/83 (21 Mar 2023 09:28) (118/53 - 164/83)  BP(mean): --  RR: 20 (21 Mar 2023 09:28) (18 - 20)  SpO2: 95% (21 Mar 2023 09:28) (95% - 97%)    Parameters below as of 21 Mar 2023 09:28  Patient On (Oxygen Delivery Method): room air      Height (cm): 157.5 (03-16 @ 15:43)  Weight (kg): 122.5 (03-16 @ 15:43)  BMI (kg/m2): 49.4 (03-16 @ 15:43)      Constitutional:  NAD. Obese  HEENT: NCAT, MMM, OP clear, EOMI, , no proptosis or lid retraction  Neck: no thyromegaly or palpable thyroid nodules   Respiratory: lungs CTAB.  Cardiovascular: regular rhythm, normal S1 and S2, no audible murmurs, mild peripheral edema  GI: soft, NT/ND, no masses/HSM appreciated.  Neurology: no tremors, DTR 2+  Skin: no visible rashes/lesions.  Psychiatric: AAO x 3, normal affect/mood.  Ext: radial pulses intact, DP pulses intact, extremities warm, no cyanosis, clubbing or edema.       LABS:                        12.3   8.51  )-----------( 369      ( 21 Mar 2023 06:59 )             38.6     03-21    138  |  101  |  11  ----------------------------<  186<H>  4.0   |  25  |  0.56    Ca    9.1      21 Mar 2023 06:59  Phos  4.2     03-21  Mg     1.8     03-21    TPro  6.6  /  Alb  3.6  /  TBili  0.5  /  DBili  x   /  AST  24  /  ALT  33  /  AlkPhos  64  03-21    PT/INR - ( 21 Mar 2023 06:59 )   PT: 12.4 sec;   INR: 1.04          PTT - ( 21 Mar 2023 06:59 )  PTT:30.0 sec      Thyroid Stimulating Hormone, Serum: 7.150 (03-16 @ 13:04)    CAPILLARY BLOOD GLUCOSE  POCT Blood Glucose.: 175 mg/dL (21 Mar 2023 11:44)  POCT Blood Glucose.: 186 mg/dL (21 Mar 2023 06:56)  POCT Blood Glucose.: 190 mg/dL (20 Mar 2023 22:01)  POCT Blood Glucose.: 187 mg/dL (20 Mar 2023 16:49)

## 2023-03-21 NOTE — PROGRESS NOTE ADULT - SUBJECTIVE AND OBJECTIVE BOX
INFECTIOUS DISEASES CONSULT FOLLOW-UP NOTE    INTERVAL HPI/OVERNIGHT EVENTS: cornel      ROS:   Constitutional, eyes, ENT, cardiovascular, respiratory, gastrointestinal, genitourinary, integumentary, neurological, psychiatric and heme/lymph are otherwise negative other than noted above       ANTIBIOTICS/RELEVANT:    MEDICATIONS  (STANDING):  cefTRIAXone   IVPB 2000 milliGRAM(s) IV Intermittent every 24 hours  dapagliflozin 10 milliGRAM(s) Oral every 24 hours  enoxaparin Injectable 40 milliGRAM(s) SubCutaneous every 12 hours  insulin lispro (ADMELOG) corrective regimen sliding scale   SubCutaneous Before meals and at bedtime  magnesium oxide 800 milliGRAM(s) Oral once  valsartan 40 milliGRAM(s) Oral daily    MEDICATIONS  (PRN):        Vital Signs Last 24 Hrs  T(C): 36 (21 Mar 2023 13:43), Max: 36.7 (21 Mar 2023 08:49)  T(F): 96.8 (21 Mar 2023 13:43), Max: 98.1 (21 Mar 2023 08:49)  HR: 83 (21 Mar 2023 09:28) (72 - 83)  BP: 164/83 (21 Mar 2023 09:28) (123/67 - 164/83)  BP(mean): --  RR: 20 (21 Mar 2023 09:28) (18 - 20)  SpO2: 95% (21 Mar 2023 09:28) (95% - 97%)    Parameters below as of 21 Mar 2023 09:28  Patient On (Oxygen Delivery Method): room air        03-20-23 @ 07:01 - 03-21-23 @ 07:00  --------------------------------------------------------  IN: 420 mL / OUT: 0 mL / NET: 420 mL    03-21-23 @ 07:01 - 03-21-23 @ 17:44  --------------------------------------------------------  IN: 360 mL / OUT: 0 mL / NET: 360 mL      PHYSICAL EXAM:  Constitutional: alert, NAD  Eyes: the sclera and conjunctiva were normal.   ENT: the ears and nose were normal in appearance.   Neck: the appearance of the neck was normal and the neck was supple.   Pulmonary: no respiratory distress and lungs were clear to auscultation bilaterally.   Heart: heart rate was normal and rhythm regular, normal S1 and S2  Vascular:. there was no peripheral edema  Abdomen: normal bowel sounds, soft, non-tender  Neurological: no focal deficits.   Psychiatric: the affect was normal        LABS:                        12.3   8.51  )-----------( 369      ( 21 Mar 2023 06:59 )             38.6     03-21    138  |  101  |  11  ----------------------------<  186<H>  4.0   |  25  |  0.56    Ca    9.1      21 Mar 2023 06:59  Phos  4.2     03-21  Mg     1.8     03-21    TPro  6.6  /  Alb  3.6  /  TBili  0.5  /  DBili  x   /  AST  24  /  ALT  33  /  AlkPhos  64  03-21    PT/INR - ( 21 Mar 2023 06:59 )   PT: 12.4 sec;   INR: 1.04          PTT - ( 21 Mar 2023 06:59 )  PTT:30.0 sec      MICROBIOLOGY:      RADIOLOGY & ADDITIONAL STUDIES:  Reviewed

## 2023-03-21 NOTE — DIETITIAN INITIAL EVALUATION ADULT - PERTINENT MEDS FT
MEDICATIONS  (STANDING):  cefTRIAXone   IVPB 2000 milliGRAM(s) IV Intermittent every 24 hours  enoxaparin Injectable 40 milliGRAM(s) SubCutaneous every 12 hours  insulin lispro (ADMELOG) corrective regimen sliding scale   SubCutaneous Before meals and at bedtime  magnesium oxide 800 milliGRAM(s) Oral once    MEDICATIONS  (PRN):

## 2023-03-21 NOTE — PROGRESS NOTE ADULT - PROBLEM SELECTOR PLAN 1
2:1 AV block w LBBB likely 2/2 myocarditis likely 2/2 infection given history of viral like symptoms (2 wk hx of cough, fever PEREIRA)  vs ischemia vs infiltrative disease.   - ECHO 03/17/2023: Moderately reduced LVSF, EF: 38%, with global hypokinesis and normal RV size/function   - Cardiac MRI 3/17/23: LV is normal in size. No evidence of LVH. LV global systolic function is reduced. Global hypokinesis with paradoxical septal motion.  RV is normal in size. RV global systolic function is normal. RV ejection fraction is 60%.  No significant valvular abnormalities.  No significant abnormalities of the visualized portions of the great vessels. On delayed enhancement imaging,  there is evidence of myocardial scarring in a non ischemic pattern. The pattern can be seen in myocarditis.  - Patient's EKG improved after initiation of - CTX 2g QD for 14 days (last day April 1st)  - Lyme titers +, f/u western blot   - EP following 2:1 AV block w LBBB likely 2/2 myocarditis likely 2/2 infection given history of viral like symptoms (2 wk hx of cough, fever PEREIRA)  vs ischemia vs infiltrative disease.   - ECHO 03/17/2023: Moderately reduced LVSF, EF: 38%, with global hypokinesis and normal RV size/function   - Cardiac MRI 3/17/23: LV is normal in size. No evidence of LVH. LV global systolic function is reduced. Global hypokinesis with paradoxical septal motion.  RV is normal in size. RV global systolic function is normal. RV ejection fraction is 60%.  No significant valvular abnormalities.  No significant abnormalities of the visualized portions of the great vessels. On delayed enhancement imaging,  there is evidence of myocardial scarring in a non ischemic pattern. The pattern can be seen in myocarditis.  - Patient's EKG improved after initiation of - CTX 2g QD for 14 days (last day April 1st)  - Lyme titers +, f/u western blot   - EP following - no indication for ppm at this time

## 2023-03-21 NOTE — CONSULT NOTE ADULT - PROBLEM SELECTOR RECOMMENDATION 2
New diagnosis.  TSH  7.15 and free T4 1.3.   Please start levothyroxine 25mcg daily.  She will need repeat TFTs in 4-6 weeks as an OP.

## 2023-03-21 NOTE — PROGRESS NOTE ADULT - ASSESSMENT
47F with no significant PMHx presenting with URI symptoms and fatigue, found to have AV block and positive Lyme screen (C6 peptide). Patient endorses spending significant time in her yard that is frequented by deer placing her at risk for Lyme disease. 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   - C/w CTX 2g IV q24h  - f/u western blot for confirmatory testing  - f/u cardiac MRI read  - trend EKGs  - please note that if western blot positive patient should be transitioned to doxycycline 100mg PO q12h to complete a 3 wk course (through 4/17)    ID team 1 to follow  47F with no significant PMHx presenting with URI symptoms and fatigue, found to have AV block and positive Lyme screen (C6 peptide). Patient endorses spending significant time in her yard that is frequented by deer placing her at risk for Lyme disease. 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   - C/w CTX 2g IV q24h  - f/u western blot for confirmatory testing    ID team 1 to follow

## 2023-03-21 NOTE — DIETITIAN INITIAL EVALUATION ADULT - OTHER INFO
46 y/o F, with no significant PMH, who presented to [facility] ED on 3/16/23 from Dr. Rushing's office who did an EKG that shows a high degree AV block and LBBB. Now admitted to cardiology/telemetry for further workup of high-grade AV block. Pt admitted to CCU for monitoring and acute heart failure management. CXR showed mild pulmonary edema, labs with no major electrolyte abnormalities and no anemia, TTE shows global LV dysfunction with LVEF~30%, normal cavity size and normal RV. EP consulted, not recommending pacemaker at this time. Lyme C6 abs positive, ID consulted, started on empiric CTX. EBV abs c/w past infection. Cardiac MRI read pending. Pt now with 1st degree AV block x 3 days after initiating CTX treatment. Stable for stepdown to [facility] tele.    Pt seen this AM on 5UR, attended IDR. Reported during IDR pt refusing much care/meds (insulin). Pt provided limited recall today when seen. PTA pt reports kosher diet, feels she watches what she eats. Reports intake of unsalted crackers /matzo, rice cakes, eggs. Feels she watches her sugar. Pt noted with A1c 9.8%, last 3 POCT 186 190 187 - no DM hx noted in chart, pt reports she has been made aware of this by team. Reports he last A1c was WDL x2 years ago and not checked since. Pt reporting various times during RD visit unsure of how she got DM as well as not understanding how her heart began to retain fluid. ? Pt understanding of current medical conditions. RD attempted to provide diet education today of which pt was some what willing to listen to. Admit wt 270 pounds, pt reports wt is up in setting of fluids. Remains with 2+BL leg ankle and foot edema. Reports owning scale. HDL 32. No pain. Kyrie 20. BM+3/19. No pressure ulcers.   Please see below for nutritions recommendations. Recs made with team.

## 2023-03-22 ENCOUNTER — TRANSCRIPTION ENCOUNTER (OUTPATIENT)
Age: 48
End: 2023-03-22

## 2023-03-22 VITALS — TEMPERATURE: 97 F

## 2023-03-22 LAB
ANION GAP SERPL CALC-SCNC: 8 MMOL/L — SIGNIFICANT CHANGE UP (ref 5–17)
B2 GLYCOPROT1 AB SER QL: NEGATIVE — SIGNIFICANT CHANGE UP
BUN SERPL-MCNC: 13 MG/DL — SIGNIFICANT CHANGE UP (ref 7–23)
CALCIUM SERPL-MCNC: 9.3 MG/DL — SIGNIFICANT CHANGE UP (ref 8.4–10.5)
CHLORIDE SERPL-SCNC: 102 MMOL/L — SIGNIFICANT CHANGE UP (ref 96–108)
CO2 SERPL-SCNC: 28 MMOL/L — SIGNIFICANT CHANGE UP (ref 22–31)
CREAT SERPL-MCNC: 0.63 MG/DL — SIGNIFICANT CHANGE UP (ref 0.5–1.3)
CULTURE RESULTS: SIGNIFICANT CHANGE UP
EGFR: 110 ML/MIN/1.73M2 — SIGNIFICANT CHANGE UP
GLUCOSE BLDC GLUCOMTR-MCNC: 148 MG/DL — HIGH (ref 70–99)
GLUCOSE BLDC GLUCOMTR-MCNC: 190 MG/DL — HIGH (ref 70–99)
GLUCOSE SERPL-MCNC: 182 MG/DL — HIGH (ref 70–99)
HCT VFR BLD CALC: 37.8 % — SIGNIFICANT CHANGE UP (ref 34.5–45)
HGB BLD-MCNC: 12.1 G/DL — SIGNIFICANT CHANGE UP (ref 11.5–15.5)
MAGNESIUM SERPL-MCNC: 1.8 MG/DL — SIGNIFICANT CHANGE UP (ref 1.6–2.6)
MCHC RBC-ENTMCNC: 30.1 PG — SIGNIFICANT CHANGE UP (ref 27–34)
MCHC RBC-ENTMCNC: 32 GM/DL — SIGNIFICANT CHANGE UP (ref 32–36)
MCV RBC AUTO: 94 FL — SIGNIFICANT CHANGE UP (ref 80–100)
NRBC # BLD: 0 /100 WBCS — SIGNIFICANT CHANGE UP (ref 0–0)
PLATELET # BLD AUTO: 390 K/UL — SIGNIFICANT CHANGE UP (ref 150–400)
POTASSIUM SERPL-MCNC: 4.4 MMOL/L — SIGNIFICANT CHANGE UP (ref 3.5–5.3)
POTASSIUM SERPL-SCNC: 4.4 MMOL/L — SIGNIFICANT CHANGE UP (ref 3.5–5.3)
RBC # BLD: 4.02 M/UL — SIGNIFICANT CHANGE UP (ref 3.8–5.2)
RBC # FLD: 16 % — HIGH (ref 10.3–14.5)
SODIUM SERPL-SCNC: 138 MMOL/L — SIGNIFICANT CHANGE UP (ref 135–145)
SPECIMEN SOURCE: SIGNIFICANT CHANGE UP
T GONDII DNA SPEC QL NAA+PROBE: SIGNIFICANT CHANGE UP
T GONDII DNA SPEC QL NAA+PROBE: SIGNIFICANT CHANGE UP
T4 FREE SERPL-MCNC: 1.24 NG/DL — SIGNIFICANT CHANGE UP (ref 0.93–1.7)
THYROGLOB AB FLD IA-ACNC: <20 IU/ML — SIGNIFICANT CHANGE UP
THYROGLOB AB SERPL-ACNC: <20 IU/ML — SIGNIFICANT CHANGE UP
THYROGLOB SERPL-MCNC: 57.9 NG/ML — SIGNIFICANT CHANGE UP (ref 1.6–59.9)
THYROPEROXIDASE AB SERPL-ACNC: <10 IU/ML — SIGNIFICANT CHANGE UP
TSH SERPL-MCNC: 5.46 UIU/ML — HIGH (ref 0.27–4.2)
WBC # BLD: 9.06 K/UL — SIGNIFICANT CHANGE UP (ref 3.8–10.5)
WBC # FLD AUTO: 9.06 K/UL — SIGNIFICANT CHANGE UP (ref 3.8–10.5)

## 2023-03-22 PROCEDURE — 87798 DETECT AGENT NOS DNA AMP: CPT

## 2023-03-22 PROCEDURE — 85610 PROTHROMBIN TIME: CPT

## 2023-03-22 PROCEDURE — 99232 SBSQ HOSP IP/OBS MODERATE 35: CPT

## 2023-03-22 PROCEDURE — 83735 ASSAY OF MAGNESIUM: CPT

## 2023-03-22 PROCEDURE — 83605 ASSAY OF LACTIC ACID: CPT

## 2023-03-22 PROCEDURE — 83036 HEMOGLOBIN GLYCOSYLATED A1C: CPT

## 2023-03-22 PROCEDURE — 86682 HELMINTH ANTIBODY: CPT

## 2023-03-22 PROCEDURE — 99291 CRITICAL CARE FIRST HOUR: CPT | Mod: 25

## 2023-03-22 PROCEDURE — 86376 MICROSOMAL ANTIBODY EACH: CPT

## 2023-03-22 PROCEDURE — 86617 LYME DISEASE ANTIBODY: CPT

## 2023-03-22 PROCEDURE — A9577: CPT

## 2023-03-22 PROCEDURE — 84100 ASSAY OF PHOSPHORUS: CPT

## 2023-03-22 PROCEDURE — 85730 THROMBOPLASTIN TIME PARTIAL: CPT

## 2023-03-22 PROCEDURE — 86140 C-REACTIVE PROTEIN: CPT

## 2023-03-22 PROCEDURE — 85652 RBC SED RATE AUTOMATED: CPT

## 2023-03-22 PROCEDURE — 86664 EPSTEIN-BARR NUCLEAR ANTIGEN: CPT

## 2023-03-22 PROCEDURE — 87899 AGENT NOS ASSAY W/OPTIC: CPT

## 2023-03-22 PROCEDURE — 87040 BLOOD CULTURE FOR BACTERIA: CPT

## 2023-03-22 PROCEDURE — 0225U NFCT DS DNA&RNA 21 SARSCOV2: CPT

## 2023-03-22 PROCEDURE — 86317 IMMUNOASSAY INFECTIOUS AGENT: CPT

## 2023-03-22 PROCEDURE — 87651 STREP A DNA AMP PROBE: CPT

## 2023-03-22 PROCEDURE — 87081 CULTURE SCREEN ONLY: CPT

## 2023-03-22 PROCEDURE — 86663 EPSTEIN-BARR ANTIBODY: CPT

## 2023-03-22 PROCEDURE — 86225 DNA ANTIBODY NATIVE: CPT

## 2023-03-22 PROCEDURE — 84702 CHORIONIC GONADOTROPIN TEST: CPT

## 2023-03-22 PROCEDURE — 86658 ENTEROVIRUS ANTIBODY: CPT

## 2023-03-22 PROCEDURE — 86800 THYROGLOBULIN ANTIBODY: CPT

## 2023-03-22 PROCEDURE — 86038 ANTINUCLEAR ANTIBODIES: CPT

## 2023-03-22 PROCEDURE — 93308 TTE F-UP OR LMTD: CPT

## 2023-03-22 PROCEDURE — 87880 STREP A ASSAY W/OPTIC: CPT

## 2023-03-22 PROCEDURE — 86753 PROTOZOA ANTIBODY NOS: CPT

## 2023-03-22 PROCEDURE — 99223 1ST HOSP IP/OBS HIGH 75: CPT | Mod: GC

## 2023-03-22 PROCEDURE — 84439 ASSAY OF FREE THYROXINE: CPT

## 2023-03-22 PROCEDURE — 84432 ASSAY OF THYROGLOBULIN: CPT

## 2023-03-22 PROCEDURE — 86147 CARDIOLIPIN ANTIBODY EA IG: CPT

## 2023-03-22 PROCEDURE — 80053 COMPREHEN METABOLIC PANEL: CPT

## 2023-03-22 PROCEDURE — 83880 ASSAY OF NATRIURETIC PEPTIDE: CPT

## 2023-03-22 PROCEDURE — 87476 LYME DIS DNA AMP PROBE: CPT

## 2023-03-22 PROCEDURE — 36415 COLL VENOUS BLD VENIPUNCTURE: CPT

## 2023-03-22 PROCEDURE — 84443 ASSAY THYROID STIM HORMONE: CPT

## 2023-03-22 PROCEDURE — 80061 LIPID PANEL: CPT

## 2023-03-22 PROCEDURE — 84480 ASSAY TRIIODOTHYRONINE (T3): CPT

## 2023-03-22 PROCEDURE — 82962 GLUCOSE BLOOD TEST: CPT

## 2023-03-22 PROCEDURE — 75561 CARDIAC MRI FOR MORPH W/DYE: CPT

## 2023-03-22 PROCEDURE — 86581 STRPTCS PNEUM ANTB SEROT IA: CPT

## 2023-03-22 PROCEDURE — 82164 ANGIOTENSIN I ENZYME TEST: CPT

## 2023-03-22 PROCEDURE — 86665 EPSTEIN-BARR CAPSID VCA: CPT

## 2023-03-22 PROCEDURE — 86146 BETA-2 GLYCOPROTEIN ANTIBODY: CPT

## 2023-03-22 PROCEDURE — 71045 X-RAY EXAM CHEST 1 VIEW: CPT

## 2023-03-22 PROCEDURE — 86160 COMPLEMENT ANTIGEN: CPT

## 2023-03-22 PROCEDURE — C8923: CPT

## 2023-03-22 PROCEDURE — 87635 SARS-COV-2 COVID-19 AMP PRB: CPT

## 2023-03-22 PROCEDURE — 84484 ASSAY OF TROPONIN QUANT: CPT

## 2023-03-22 PROCEDURE — 80048 BASIC METABOLIC PNL TOTAL CA: CPT

## 2023-03-22 PROCEDURE — 86618 LYME DISEASE ANTIBODY: CPT

## 2023-03-22 PROCEDURE — 85027 COMPLETE CBC AUTOMATED: CPT

## 2023-03-22 PROCEDURE — 85025 COMPLETE CBC W/AUTO DIFF WBC: CPT

## 2023-03-22 RX ORDER — DAPAGLIFLOZIN 10 MG/1
1 TABLET, FILM COATED ORAL
Qty: 30 | Refills: 0
Start: 2023-03-22 | End: 2023-04-20

## 2023-03-22 RX ORDER — LEVOTHYROXINE SODIUM 125 MCG
1 TABLET ORAL
Qty: 30 | Refills: 2
Start: 2023-03-22 | End: 2023-06-19

## 2023-03-22 RX ORDER — DAPAGLIFLOZIN 10 MG/1
1 TABLET, FILM COATED ORAL
Qty: 30 | Refills: 3
Start: 2023-03-22 | End: 2023-07-19

## 2023-03-22 RX ADMIN — CEFTRIAXONE 100 MILLIGRAM(S): 500 INJECTION, POWDER, FOR SOLUTION INTRAMUSCULAR; INTRAVENOUS at 13:33

## 2023-03-22 RX ADMIN — DAPAGLIFLOZIN 10 MILLIGRAM(S): 10 TABLET, FILM COATED ORAL at 10:06

## 2023-03-22 RX ADMIN — Medication 2: at 07:02

## 2023-03-22 RX ADMIN — Medication 25 MICROGRAM(S): at 07:01

## 2023-03-22 NOTE — PROGRESS NOTE ADULT - PROVIDER SPECIALTY LIST ADULT
Cardiology
Infectious Disease
Infectious Disease
CCU
Electrophysiology
Infectious Disease
CCU
CCU
Cardiology
Cardiology

## 2023-03-22 NOTE — PROGRESS NOTE ADULT - PROBLEM SELECTOR PLAN 3
No known hx DM, however glucose 234   A1c 9.8  - Sliding Scale  - patient refusing insulin at this time
No known hx DM, however glucose 234   A1c 9.8  - Sliding Scale  - Patient amenable to starting Farxiga   - Endo consulted, recs appreciated
No known hx DM, however glucose 234   A1c 9.8  - Sliding Scale  - Patient amenable to starting Farxiga   - Endo consulted

## 2023-03-22 NOTE — PROGRESS NOTE ADULT - PROBLEM SELECTOR PLAN 1
2:1 AV block w LBBB likely 2/2 myocarditis likely 2/2 infection given history of viral like symptoms (2 wk hx of cough, fever PEREIRA)  vs ischemia vs infiltrative disease.   - ECHO 03/17/2023: Moderately reduced LVSF, EF: 38%, with global hypokinesis and normal RV size/function   - Cardiac MRI 3/17/23: LV is normal in size. No evidence of LVH. LV global systolic function is reduced. Global hypokinesis with paradoxical septal motion.  RV is normal in size. RV global systolic function is normal. RV ejection fraction is 60%.  No significant valvular abnormalities.  No significant abnormalities of the visualized portions of the great vessels. On delayed enhancement imaging,  there is evidence of myocardial scarring in a non ischemic pattern. The pattern can be seen in myocarditis.  - Patient's EKG improved after initiation of - CTX 2g QD now being transitioned to Doxycycline 100 mg BID until April 7, 2023   - CHF following recommended starting Entresto but patient declining at this time   - EP following

## 2023-03-22 NOTE — PROGRESS NOTE ADULT - SUBJECTIVE AND OBJECTIVE BOX
Interventional Cardiology PA Adult Progress Note    Subjective Assessment:  Patient seen and examined at bedside denies chest pain, shortness of breath, dizziness, abdominal pain, nausea, vomiting, numbness, tingling, or LE edema.   Endorses palpitations in the morning after she wakes up which subsequently improve throughout the day.     Tele: 1st degree HRB w    ROS Negative except as per Subjective and HPI  	  MEDICATIONS:  valsartan 40 milliGRAM(s) Oral daily  cefTRIAXone   IVPB 2000 milliGRAM(s) IV Intermittent every 24 hours  dapagliflozin 10 milliGRAM(s) Oral every 24 hours  insulin lispro (ADMELOG) corrective regimen sliding scale   SubCutaneous Before meals and at bedtime  levothyroxine 25 MICROGram(s) Oral daily  enoxaparin Injectable 40 milliGRAM(s) SubCutaneous every 12 hours      	    [PHYSICAL EXAM:  TELEMETRY:  T(C): 36.3 (03-22-23 @ 13:46), Max: 36.7 (03-22-23 @ 06:09)  HR: 89 (03-22-23 @ 12:29) (72 - 89)  BP: 106/73 (03-22-23 @ 12:29) (106/73 - 150/78)  RR: 16 (03-22-23 @ 12:29) (16 - 18)  SpO2: 99% (03-22-23 @ 12:29) (97% - 100%)  Wt(kg): --  I&O's Summary    21 Mar 2023 07:01  -  22 Mar 2023 07:00  --------------------------------------------------------  IN: 600 mL / OUT: 1100 mL / NET: -500 mL    22 Mar 2023 07:01  -  22 Mar 2023 13:51  --------------------------------------------------------  IN: 180 mL / OUT: 0 mL / NET: 180 mL                                      Appearance: Normal	  HEENT:   Normal oral mucosa, PERRL, EOMI	  Neck: Supple, + JVD/ - JVD; Carotid Bruit   Cardiovascular: Normal S1 S2, No JVD, No murmurs,   Respiratory: Lungs clear to auscultation/Decreased Breath Sounds/No Rales, Rhonchi, Wheezing	  Gastrointestinal:  Soft, Non-tender, + BS	  Skin: No rashes, No ecchymoses, No cyanosis  Extremities: Normal range of motion, No clubbing, cyanosis or edema  Vascular: Peripheral pulses palpable 2+ bilaterally  Neurologic: Non-focal  Psychiatry: A & O x 3, Mood & affect appropriate      	    ECG:  	  RADIOLOGY:   DIAGNOSTIC TESTING:  [x] Echocardiogram 03/172023 : Moderately reduced LVSF, EF: 38%, with global hypokinesis and normal RV size/function   [ ]  Catheterization:  [ ] Stress Test:    [ ] CODI  OTHER: 	  [x] Cardiac MRI 3/17/23: LV is normal in size. No evidence of LVH. LV global systolic function is reduced. Global hypokinesis with paradoxical septal motion.  RV is normal in size. RV global systolic function is normal. RV ejection fraction is 60%.  No significant valvular abnormalities.  No significant abnormalities of the visualized portions of the great vessels. On delayed enhancement imaging,  there is evidence of myocardial scarring in a non ischemic pattern. The pattern can be seen in myocarditis.      LABS:	 	  CARDIAC MARKERS:                       12.1   9.06  )-----------( 390      ( 22 Mar 2023 08:32 )             37.8     03-22    138  |  102  |  13  ----------------------------<  182<H>  4.4   |  28  |  0.63    Ca    9.3      22 Mar 2023 08:32  Phos  4.2     03-21  Mg     1.8     03-22    TPro  6.6  /  Alb  3.6  /  TBili  0.5  /  DBili  x   /  AST  24  /  ALT  33  /  AlkPhos  64  03-21    proBNP:   Lipid Profile:   HgA1c:   TSH: Thyroid Stimulating Hormone, Serum: 5.460 uIU/mL (03-22 @ 08:32)    PT/INR - ( 21 Mar 2023 06:59 )   PT: 12.4 sec;   INR: 1.04          PTT - ( 21 Mar 2023 06:59 )  PTT:30.0 sec    ASSESSMENT/PLAN: 	        DVT ppx:  Dispo:

## 2023-03-22 NOTE — DISCHARGE NOTE PROVIDER - NSDCFUADDINST_GEN_ALL_CORE_FT
Please return to the ED if you develop chest pain, shortness of breath, palpitations, dizziness, headache, numbness, or tingling.

## 2023-03-22 NOTE — PROGRESS NOTE ADULT - ASSESSMENT
47F with no significant PMHx presenting with URI symptoms and fatigue, found to have AV block and positive Lyme screen (C6 peptide). Patient endorses spending significant time in her yard that is frequented by deer placing her at risk for Lyme disease. 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   - STOP CTX  - start doxycycline 100mh PO q12h though 4/7, please follow up with cardiologist, patient will not require ID follow up as o/p unless change in status  - may f/u western blot for confirmatory testing    ID team 1 to sign off 47F with no significant PMHx presenting with URI symptoms and fatigue, found to have AV block and positive Lyme screen (C6 peptide). Patient endorses spending significant time in her yard that is frequented by deer placing her at risk for Lyme disease. 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   - STOP CTX  - start doxycycline 100mg PO q12h though 4/7, please follow up with cardiologist, patient will also be following up with a Lyme specialist [facility]  - may f/u western blot for confirmatory testing    ID team 1 to sign off

## 2023-03-22 NOTE — PROGRESS NOTE ADULT - TIME BILLING
Apparent improvement in AV block on ceftriaxone supporting diagnosis of Lyme carditis. Lyme WB confirmatory testing pending.
Await confirmatory Lyme testing (WB) would likely treat for Lyme carditis given improvement on IV ceftriaxone and absence of alternative etiology of conduction abnormality. Anticipate transition to PO doxycycline on discharge to complete total 3w antibiotic course thru 4/7.
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    Please reconsult with ?

## 2023-03-22 NOTE — DISCHARGE NOTE NURSING/CASE MANAGEMENT/SOCIAL WORK - NSDCFUADDAPPT_GEN_ALL_CORE_FT
(1) Please follow up with cardiology, Dr. Rushing   (2) Please call and make an appointment with endocrinology at [phone]  (3) Please follow up with your PMD

## 2023-03-22 NOTE — DISCHARGE NOTE PROVIDER - PROVIDER TOKENS
PROVIDER:[TOKEN:[[id number]],FOLLOWUP:[1 week]] PROVIDER:[TOKEN:[[id number]],SCHEDULEDAPPT:[04/18/2023],SCHEDULEDAPPTTIME:[01:00 PM],ESTABLISHEDPATIENT:[T]],FREE:[LAST:[[facility]],PHONE:[phone]],FAX:[(   )    -]]

## 2023-03-22 NOTE — PROGRESS NOTE ADULT - SUBJECTIVE AND OBJECTIVE BOX
EPS Progress Note    S: OOB in a chair, NAD, no complains      MEDICATIONS  (STANDING):  dapagliflozin 10 milliGRAM(s) Oral every 24 hours  doxycycline monohydrate Capsule 100 milliGRAM(s) Oral every 12 hours  enoxaparin Injectable 40 milliGRAM(s) SubCutaneous every 12 hours  insulin lispro (ADMELOG) corrective regimen sliding scale   SubCutaneous Before meals and at bedtime  levothyroxine 25 MICROGram(s) Oral daily  valsartan 40 milliGRAM(s) Oral daily      Telemetry: sinus rhythm  1st degree AVB , LBBB           General:  NAD        HEENT:   PERRL, EOMI	  Neck: Supple, - JVD  Cardiovascular:  S1 S2, No JVD  Respiratory: CTA B/L     	  Gastrointestinal:  Soft, Non-tender, + BS	  Skin: No rashes, No ecchymoses, No cyanosis  Extremities: No edema  Psychiatry: A & O x 3	                                   < from: TTE Echo Complete w/ Contrast w/o Doppler (03.17.23 @ 10:45) >  1. Moderately reduced left ventricular systolic function with global   hypokinesis.   2. Normal right ventricular size and systolic function.   3. No significant valvular disease.   4. Mild pulmonary hypertension.   5. No pericardial effusion.    < from: MR Cardiac w/wo IV Cont (03.17.23 @ 22:48) >  1.  The left ventricle (LV) is normal in size. There is no evidence of LV   hypertrophy . Left ventricular global systolic function is reduced. There   is global hypokinesis with paradoxical septal motion. The LV ejection   fraction is 47%.  2.  Theright ventricle (RV) is normal in size. RV global systolic   function is normal. The RV ejection fraction is 60 %.  3.  No significant valvular abnormalities.  4.  No significant abnormalities of the visualized portions of the great   vessels.  5.  On delayed enhancement imaging,  there is evidence of myocardial   scarring in a non ischemic pattern. The pattern can be seen in   myocarditis.    < end of copied text >                            12.1   9.06  )-----------( 390      ( 22 Mar 2023 08:32 )             37.8     03-22    138  |  102  |  13  ----------------------------<  182<H>  4.4   |  28  |  0.63    Ca    9.3      22 Mar 2023 08:32  Phos  4.2     03-21  Mg     1.8     03-22    TPro  6.6  /  Alb  3.6  /  TBili  0.5  /  DBili  x   /  AST  24  /  ALT  33  /  AlkPhos  64  03-21    PT/INR - ( 21 Mar 2023 06:59 )   PT: 12.4 sec;   INR: 1.04          PTT - ( 21 Mar 2023 06:59 )  PTT:30.0 sec    Assessment/Plan:  46 y/o F, with no PMH, who presented to [facility] ED from outpatient provider, w/ 2 weeks of SOB and increased WOB w ambulation, found to have high degree AV block and LBBB, admitted to CCU for monitoring and acute heart failure management.   Patient was  Lyme C6 abs positive, she was started on empiric CTX. EBV abs c/w past infection.   Patients conduction improved, but she still has 1 dergee AVB and LBBB, she was offered EPS study but she declined at this time  She will continue treatment for Lyme with Doxycycline 100 mg bid till 4/7/23 and follow up with her cardiologist.

## 2023-03-22 NOTE — PROGRESS NOTE ADULT - PROBLEM SELECTOR PLAN 2
ECHO 03/17/2023: Moderately reduced LVSF, EF: 38%, with global hypokinesis and normal RV size/function  - s/p lasix 20 mg IVP x 1 and Lasix 40 mg IVP x 1, has not required additional Lasix since  - No further diuresis needed at this time  - Declining to start Entresto   - Continue Farxiga 10 mg daily

## 2023-03-22 NOTE — PROGRESS NOTE ADULT - ASSESSMENT
48 y/o F, with no reported PMH, who presented to [facility] ED from outpatient provider, w/ 2 weeks of SOB and increased WOB w ambulation, found to have high degree AV block and LBBB, admitted to CCU for monitoring and acute heart failure management. CXR showed mild pulmonary edema, labs with no major electrolyte abnormalities and no anemia, TTE shows global LV dysfunction with LVEF~30%, normal cavity size and normal RV. EP consulted, not recommending pacemaker at this time.  Lyme C6 abs positive, ID consulted, started on empiric CTX. EBV abs c/w past infection. 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 Although AV block has improved, patient still with wide QRS and EP consulted for ICD evaluation

## 2023-03-22 NOTE — CONSULT NOTE ADULT - SUBJECTIVE AND OBJECTIVE BOX
Patient is a 47y old  Female who presents with a chief complaint of AV block (22 Mar 2023 11:30)      HPI:  48 y/o F, with no significant PMH, who presented to [facility] ED on 3/16/23 from Dr. Rushing's office who did an EKG that shows a high degree AV block and LBBB. Patient states that she was in her usual state of health until about 2 weeks ago, where she had difficulty breathing with ambulation. Patient also w/ subjective fever, weakness, and light cough; however have all resolved.  Patient went to urgent care 1 night ago and had an EKG that showed high degree AV block, which prompted her to see her cardiologist today. Patient feels well overall. Patient denies CP, dizziness, headache, lightheadedness, syncope, fever, chills, malaise, or other symptoms.     In the ED, VS: T: 99F, BP: 125/71 mmHg, HR: 68 bpm, spO2: 96% on RA  Labs significant: WBC: 11.60, BNP: 2421, COVID-19: Negative; CXR: High degree AVB, LBBB @    CXR: Cardiomegaly. Question mild pulmonary edema. Hazy opacification of the peripheral lung bases, most likely due to overlying soft tissue, although   early consolidation cannot be excluded. EKG: High grade AV block, LBBB, rates in 66 bpm   Patient admitted to cardiology/telemetry for further workup of high-grade AV block.  (16 Mar 2023 15:43)    Overnight events: Pt denies any acute complaints today, is asking about when she can go home. She denies any chest pain, palpitations, SOB, headaches, lightheadedness, LE edema, orthopnea.     Telemetry: sinus with 1st degree AV block, occasional PVCs of 2-3 beats      PAST MEDICAL & SURGICAL HISTORY:  S/P       S/P hernia repair          MEDICATIONS  (STANDING):  cefTRIAXone   IVPB 2000 milliGRAM(s) IV Intermittent every 24 hours  dapagliflozin 10 milliGRAM(s) Oral every 24 hours  enoxaparin Injectable 40 milliGRAM(s) SubCutaneous every 12 hours  insulin lispro (ADMELOG) corrective regimen sliding scale   SubCutaneous Before meals and at bedtime  levothyroxine 25 MICROGram(s) Oral daily  valsartan 40 milliGRAM(s) Oral daily    MEDICATIONS  (PRN):      FAMILY HISTORY:      Review of Systems:  Otherwise negative except for pertinent positives and negatives above.       Vital Signs Last 24 Hrs  T(C): 36.4 (22 Mar 2023 09:00), Max: 36.7 (22 Mar 2023 06:09)  T(F): 97.6 (22 Mar 2023 09:00), Max: 98 (22 Mar 2023 06:09)  HR: 72 (22 Mar 2023 08:35) (72 - 80)  BP: 142/73 (22 Mar 2023 08:35) (142/59 - 150/78)  BP(mean): --  RR: 18 (22 Mar 2023 08:35) (18 - 18)  SpO2: 100% (22 Mar 2023 08:35) (97% - 100%)    Parameters below as of 22 Mar 2023 08:35  Patient On (Oxygen Delivery Method): room air        Physical Exam:  GENERAL: NAD  HEAD:  NCA  NECK: no JVD  NERVOUS SYSTEM: AAOX4  CHEST/LUNG: CTA b/l  HEART: +s1s2 RRR  ABDOMEN: soft, NT/ND  EXTREMITIES:  trace LE edema bilaterally  LYMPH: No lymphadenopathy noted  SKIN: No rashes or lesions    ECG: 3/20: sinus with 1st degree AV block, LBBB    ECHO:   < from: TTE Echo Complete w/ Contrast w/o Doppler (23 @ 10:45) >  CONCLUSIONS:     1. Moderately reduced left ventricular systolic function with global   hypokinesis.   2. Normal right ventricular size and systolic function.   3. No significant valvular disease.   4. Mild pulmonary hypertension.   5. No pericardial effusion.    < end of copied text >      Cardiac MRI:  < from: MR Cardiac w/wo IV Cont (23 @ 22:48) >  V.  Impression:    1.  The left ventricle (LV) is normal in size. There is no evidence of LV   hypertrophy . Left ventricular global systolic function is reduced. There   is global hypokinesis with paradoxical septal motion. The LV ejection   fraction is 47%.  2.  Theright ventricle (RV) is normal in size. RV global systolic   function is normal. The RV ejection fraction is 60 %.  3.  No significant valvular abnormalities.  4.  No significant abnormalities of the visualized portions of the great   vessels.  5.  On delayed enhancement imaging,  there is evidence of myocardial   scarring in a non ischemic pattern. The pattern can be seen in   myocarditis.    The sequences used in this study were designed for imaging cardiac   structures and are suboptimal for imaging other structures and organs    < end of copied text >      I&O's Detail    21 Mar 2023 07:01  -  22 Mar 2023 07:00  --------------------------------------------------------  IN:    Oral Fluid: 600 mL  Total IN: 600 mL    OUT:    Voided (mL): 1100 mL  Total OUT: 1100 mL    Total NET: -500 mL      22 Mar 2023 07:01  -  22 Mar 2023 11:52  --------------------------------------------------------  IN:    Oral Fluid: 180 mL  Total IN: 180 mL    OUT:  Total OUT: 0 mL    Total NET: 180 mL          LABS:                        12.1   9.06  )-----------( 390      ( 22 Mar 2023 08:32 )             37.8     03-22    138  |  102  |  13  ----------------------------<  182<H>  4.4   |  28  |  0.63    Ca    9.3      22 Mar 2023 08:32  Phos  4.2     03-21  Mg     1.8     22    TPro  6.6  /  Alb  3.6  /  TBili  0.5  /  DBili  x   /  AST  24  /  ALT  33  /  AlkPhos  64  03-21        PT/INR - ( 21 Mar 2023 06:59 )   PT: 12.4 sec;   INR: 1.04          PTT - ( 21 Mar 2023 06:59 )  PTT:30.0 sec

## 2023-03-22 NOTE — DISCHARGE NOTE PROVIDER - HOSPITAL COURSE
48 y/o F, with no reported PMH, who presented to [facility] ED from outpatient provider, w/ 2 weeks of SOB and increased WOB w ambulation, found to have high degree AV block and LBBB, admitted to CCU for monitoring and acute heart failure management. CXR showed mild pulmonary edema, labs with no major electrolyte abnormalities and no anemia, TTE shows global LV dysfunction with LVEF~30%, normal cavity size and normal RV. EP consulted, not recommending pacemaker at this time.  Lyme C6 abs positive, ID consulted, started on empiric CTX. EBV abs c/w past infection. Cardiac MRI w/ findings supporting myocarditis, likely lyme carditions. Western Blot confirms above diagnosis. Patient seen by endocrinology for newly diagnosed DM and hypothyrodism and CHF team.     Patient will be discharged on:      46 y/o F, with no reported PMH, who presented to [facility] ED from outpatient provider, w/ 2 weeks of SOB and increased WOB w ambulation, found to have high degree AV block and LBBB, admitted to CCU for monitoring and acute heart failure management. CXR showed mild pulmonary edema, labs with no major electrolyte abnormalities and no anemia, TTE shows global LV dysfunction with LVEF~30%, normal cavity size and normal RV. EP consulted, not recommending pacemaker at this time.  Lyme C6 abs positive, ID consulted, started on empiric CTX. EBV abs c/w past infection. Cardiac MRI w/ findings supporting myocarditis, likely lyme carditions. Western Blot confirms above diagnosis. Patient seen by endocrinology for newly diagnosed DM and hypothyrodism and CHF team.     Patient will be discharged on:     No significant events on telemetry overnight. Repeat EKG without ischemic changes. Patient has been medically cleared for discharge as per  ________. Patient has been given appropriate discharge instructions including medication regimen, access site management and follow up. Medications that patient needs refills on have been e-prescribed to preferred pharmacy.      46 y/o F, with no reported PMH, who presented to [facility] ED from outpatient provider, w/ 2 weeks of SOB and increased WOB w ambulation, found to have high degree AV block and LBBB, admitted to CCU for monitoring and acute heart failure management. CXR showed mild pulmonary edema, labs with no major electrolyte abnormalities and no anemia, TTE shows global LV dysfunction with LVEF~30%, normal cavity size and normal RV. EP consulted, not recommending pacemaker at this time.  Lyme C6 abs positive, ID consulted, started on empiric CTX. EBV abs c/w past infection. Cardiac MRI w/ findings supporting myocarditis, likely lyme carditions. Western Blot confirms above diagnosis. Patient seen by endocrinology for newly diagnosed DM and hypothyrodism and CHF team.     Patient will be discharged on:     No significant events on telemetry overnight. Repeat EKG without ischemic changes. Patient has been medically cleared for discharge as per  ________. Patient has been given appropriate discharge instructions including medication regimen, access site management and follow up. Medications that patient needs refills on have been e-prescribed to preferred pharmacy.       Cause of cardiomyopathy could be secondary to Lyme but cannot exclude lymphocytic myocarditis. Would ideally need EMB to differentiate between the two, however the pt has already declined this option. He is s/p IV lasix 70mg during her hospital stay and is currently euvolemic.  - Would recommend stopping valsartan and starting Entresto instead. (However, pt is refusing either option secondary to adverse reaction her father had to ACEi. She is interested in alternative medicine instead.)     46 y/o F, with no reported PMH, who presented to [facility] ED from outpatient provider, w/ 2 weeks of SOB and increased WOB w ambulation, found to have high degree AV block and LBBB, admitted to CCU for monitoring and acute heart failure management. CXR showed mild pulmonary edema, labs with no major electrolyte abnormalities and no anemia, TTE shows global LV dysfunction with LVEF~30%, normal cavity size and normal RV. EP consulted, not recommending pacemaker at this time.  EBV abs c/w past infection. Lyme C6 abs positive, ID consulted, started on empiric CTX and symptoms/heart block remarkably improved which clinically suggest the picture of lyme carditis. As per ID,  no need to wait for Western Blot results given strong clinical suspicion and can be transitioned to Doxycycline 100 mg Q12 hours until April 7, 2023. 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 Although lymphocytic myocarditis was considered patient was offered EMB to help differentiate between lyme carditis and lymphocytic myocarditis which she declined.      Patient will be discharged on: Doxycycline 100 mg BID x3 weeks and Farxiga 10mg.   Patient declining to start Valsartan or Entresto and would like to try natural remedies     No significant events on telemetry overnight. Repeat EKG without ischemic changes. Patient has been medically cleared for discharge as per Dr. Otto . Patient has been given appropriate discharge instructions including medication regimen, access site management and follow up. Medications that patient needs refills on have been e-prescribed to preferred pharmacy.      46 y/o F, with no reported PMH, who presented to [facility] ED from outpatient provider, w/ 2 weeks of SOB and increased WOB w ambulation, found to have high degree AV block and LBBB, admitted to CCU for monitoring and acute heart failure management. CXR showed mild pulmonary edema, labs with no major electrolyte abnormalities and no anemia, TTE shows global LV dysfunction with LVEF~30%, normal cavity size and normal RV. EP consulted, not recommending pacemaker at this time.  EBV abs c/w past infection. Lyme C6 abs positive, ID consulted, started on empiric CTX and symptoms/heart block remarkably improved which clinically suggest the picture of lyme carditis. As per ID,  no need to wait for Western Blot results given strong clinical suspicion and can be transitioned to Doxycycline 100 mg Q12 hours until April 7, 2023. 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 Although lymphocytic myocarditis was considered patient was offered EMB to help differentiate between lyme carditis and lymphocytic myocarditis which she declined.      Patient will be discharged on: Doxycycline 100 mg BID x3 weeks and Farxiga 10mg.   Patient declining to start Valsartan or Entresto and would like to try natural remedies     On telemetry AV conduction has improved but still having wide QRS which is not usual in lyme carditis. Patient was educated about risk of prolonged QRS and suggested for EP evaluation for ICD placement. However patient not amenable at this time and would like to be discharged back home.  Patient adamantly refusing, demonstrates capacity of risk vs benefits. Patient has been given appropriate discharge instructions including medication regimen, access site management and follow up. Medications that patient needs refills on have been e-prescribed to preferred pharmacy.      46 y/o F, with no reported PMH, who presented to [facility] ED from outpatient provider, w/ 2 weeks of SOB and increased WOB w ambulation, found to have high degree AV block and LBBB, admitted to CCU for monitoring and acute heart failure management. CXR showed mild pulmonary edema, labs with no major electrolyte abnormalities and no anemia, TTE shows global LV dysfunction with LVEF~30%, normal cavity size and normal RV. EP consulted, not recommending pacemaker at this time.  EBV abs c/w past infection. Lyme C6 abs positive, ID consulted, started on empiric CTX and symptoms/heart block remarkably improved which clinically suggest the picture of lyme carditis. As per ID,  no need to wait for Western Blot results given strong clinical suspicion and can be transitioned to Doxycycline 100 mg Q12 hours until April 7, 2023. 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 Although lymphocytic myocarditis was considered patient was offered EMB to help differentiate between lyme carditis and lymphocytic myocarditis which she declined.      Patient will be discharged on: Doxycycline 100 mg BID x3 weeks (through 4/7/23) and Farxiga 10mg.   Patient declining to start Valsartan or Entresto and would like to try natural remedies.    On telemetry AV conduction has improved but still having wide QRS which is not usual in lyme carditis. Patient was educated about risk of prolonged QRS and suggested for EP evaluation for ICD placement. However patient not amenable at this time and would like to be discharged back home.  Patient adamantly refusing, demonstrates capacity of risk vs benefits. Patient has been given appropriate discharge instructions including medication regimen, access site management and follow up. Medications that patient needs refills on have been e-prescribed to preferred pharmacy. Pt to follow up with Dr. Otto on Tuesday 4/18/23 at 1:00pm.      48 y/o F, with no reported PMH, who presented to [facility] ED from outpatient provider, w/ 2 weeks of SOB and increased WOB w ambulation, found to have high degree AV block and LBBB, admitted to CCU for monitoring and acute heart failure management. CXR showed mild pulmonary edema, labs with no major electrolyte abnormalities and no anemia, TTE shows global LV dysfunction with LVEF~30%, normal cavity size and normal RV. EP consulted, not recommending pacemaker at this time.  EBV abs c/w past infection. Lyme C6 abs positive, ID consulted, started on empiric CTX and symptoms/heart block remarkably improved which clinically suggest the picture of lyme carditis. As per ID,  no need to wait for Western Blot results given strong clinical suspicion and can be transitioned to Doxycycline 100 mg Q12 hours until April 7, 2023. 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 Although lymphocytic myocarditis was considered patient was offered EMB to help differentiate between lyme carditis and lymphocytic myocarditis which she declined.      Patient will be discharged on: Doxycycline 100 mg BID x3 weeks (through 4/7/23) and Levothyroxine 25 mcg daily.   Patient recommended for SLGT2 by heart failure unfortunately not covered by insurance and require prior authorization. Please follow up with your outpatient provider to obtain prior authorization and begin taking Farxiga 10mg daily.    Patient declining to start Valsartan or Entresto and would like to try natural remedies.    On telemetry AV conduction has improved but still having wide QRS which is not usual in lyme carditis. Patient was educated about risk of prolonged QRS and suggested for EP evaluation for ICD placement. However patient not amenable at this time and would like to be discharged back home.  Patient adamantly refusing, demonstrates capacity of risk vs benefits. Patient has been given appropriate discharge instructions including medication regimen, access site management and follow up. Medications that patient needs refills on have been e-prescribed to preferred pharmacy. Pt to follow up with Dr. Otto on Tuesday 4/18/23 at 1:00pm.

## 2023-03-22 NOTE — PROGRESS NOTE ADULT - PROBLEM SELECTOR PROBLEM 1
High degree atrioventricular block

## 2023-03-22 NOTE — PROGRESS NOTE ADULT - NUTRITIONAL ASSESSMENT
This patient has been assessed with a concern for Malnutrition and has been determined to have a diagnosis/diagnoses of Morbid obesity (BMI > 40).    This patient is being managed with:   Diet DASH/TLC-  Sodium & Cholesterol Restricted  [place]  Entered: Mar 17 2023  4:36PM    
This patient has been assessed with a concern for Malnutrition and has been determined to have a diagnosis/diagnoses of Morbid obesity (BMI > 40).    This patient is being managed with:   Diet DASH/TLC-  Sodium & Cholesterol Restricted  Consistent Carbohydrate {No Snacks} (CSTCHO)  [place]  Entered: Mar 22 2023  9:18AM

## 2023-03-22 NOTE — DISCHARGE NOTE PROVIDER - NSDCCPCAREPLAN_GEN_ALL_CORE_FT
PRINCIPAL DISCHARGE DIAGNOSIS  Diagnosis: Symptomatic advanced heart block  Assessment and Plan of Treatment: You were found to have a heart block on your EKG due to a conduction delay in your heart due to lyme disease. After reciving antibiotics your heart block has improved. Please ensure you finish taking your antibiotics to ensure that heart block does not return which could potentially dangerous and could lead to death.      SECONDARY DISCHARGE DIAGNOSES  Diagnosis: Chronic HFrEF (heart failure with reduced ejection fraction)  Assessment and Plan of Treatment: You were found to have weakened heart muscle called heart failure and should continue your daily farixga. Please follow up with your cardiologist in 1 week.  You should weigh yourself daily and if you notice a weight gain of more than 2-3 pounds in 2 days or 5 pounds in 1 week contact your doctor immediately as you may be retaining water weight. In addition, restrict your salt intake to less than 2 grams a day. If you develop worsening shortness of breath, leg swelling, fatigue, chest pain, difficulty sleeping at night due to shortness of breath, contact your cardiologist immediately.      Diagnosis: Lyme disease  Assessment and Plan of Treatment: You were found to have lyme disease while you were hospitalzied. Lyme disease is caused by ticks and you were treated with IV antibiotics. Please finish your course of Doxcycline 100mg twice a day for 21 days. If you don't finish your course of medications your heart block can return and worsen leading to death.    Diagnosis: Acute myocarditis  Assessment and Plan of Treatment: You were found to have inflammation of the muscles of the heart due to lyme disease. Please finish your course of Doxycycline.    Diagnosis: Diabetes mellitus  Assessment and Plan of Treatment: You were found to have diabetes during this admission. Your A1C was found to be 9.8 and were starting on Farxiga 10 mg daily to help maintain the sugar levels in your body.       Diagnosis: Hypothyroidism  Assessment and Plan of Treatment: You were found to have hypothyroidsim, pleaes continue taking Levothyroxine 25 mg daily. Pleaes follow up with the endocrinologist.     PRINCIPAL DISCHARGE DIAGNOSIS  Diagnosis: Symptomatic advanced heart block  Assessment and Plan of Treatment: You were found to have a heart block on your EKG due to a conduction delay in your heart due to lyme disease. After reciving antibiotics your heart block has improved. Please ensure you finish taking your antibiotics to ensure that heart block does not return which could potentially dangerous and could lead to death.      SECONDARY DISCHARGE DIAGNOSES  Diagnosis: Lyme disease  Assessment and Plan of Treatment: You were found to have lyme disease while you were hospitalzied. Lyme disease is caused by ticks and you were treated with IV antibiotics. Please finish your course of Doxcycline 100mg twice a day for 21 days. If you don't finish your course of medications your heart block can return and worsen leading to death.    Diagnosis: Diabetes mellitus  Assessment and Plan of Treatment: You were found to have diabetes during this admission. Your A1C was found to be 9.8 and were starting on Farxiga 10 mg daily to help maintain the sugar levels in your body.       Diagnosis: Chronic HFrEF (heart failure with reduced ejection fraction)  Assessment and Plan of Treatment: You were found to have weakened heart muscle called heart failure and should continue your daily farixga. Please follow up with your cardiologist in 1 week.  You should weigh yourself daily and if you notice a weight gain of more than 2-3 pounds in 2 days or 5 pounds in 1 week contact your doctor immediately as you may be retaining water weight. In addition, restrict your salt intake to less than 2 grams a day. If you develop worsening shortness of breath, leg swelling, fatigue, chest pain, difficulty sleeping at night due to shortness of breath, contact your cardiologist immediately.      Diagnosis: Acute myocarditis  Assessment and Plan of Treatment: You were found to have inflammation of the muscles of the heart due to lyme disease. Please finish your course of Doxycycline.    Diagnosis: Hypothyroidism  Assessment and Plan of Treatment: You were found to have hypothyroidsim, pleaes continue taking Levothyroxine 25 mg daily. Pleaes follow up with the endocrinologist.     PRINCIPAL DISCHARGE DIAGNOSIS  Diagnosis: Symptomatic advanced heart block  Assessment and Plan of Treatment: You were found to have a heart block on your EKG due to a conduction delay in your heart due to lyme disease. After reciving antibiotics your heart block has improved. Please ensure you finish taking your antibiotics to ensure that heart block does not return which could potentially dangerous and could lead to death.      SECONDARY DISCHARGE DIAGNOSES  Diagnosis: Chronic HFrEF (heart failure with reduced ejection fraction)  Assessment and Plan of Treatment: You were found to have weakened heart muscle called heart failure and should continue your daily farixga. Please follow up with your cardiologist in 1 week.  You should weigh yourself daily and if you notice a weight gain of more than 2-3 pounds in 2 days or 5 pounds in 1 week contact your doctor immediately as you may be retaining water weight. In addition, restrict your salt intake to less than 2 grams a day. If you develop worsening shortness of breath, leg swelling, fatigue, chest pain, difficulty sleeping at night due to shortness of breath, contact your cardiologist immediately.      Diagnosis: Lyme disease  Assessment and Plan of Treatment: You were found to have lyme disease while you were hospitalzied. Lyme disease is caused by ticks and you were treated with IV antibiotics. Please finish your course of Doxcycline 100mg twice a day for 21 days. If you don't finish your course of medications your heart block can return and worsen leading to death.    Diagnosis: Acute myocarditis  Assessment and Plan of Treatment: You were found to have inflammation of the muscles of the heart due to lyme disease. Please finish your course of Doxycycline.    Diagnosis: Diabetes mellitus  Assessment and Plan of Treatment: You were found to have diabetes during this admission. Your A1C was found to be 9.8 and were starting on Farxiga 10 mg daily to help maintain the sugar levels in your body.       Diagnosis: Hypothyroidism  Assessment and Plan of Treatment: You were found to have hypothyroidsim, pleaes continue taking Levothyroxine 25 mg daily. Pleaes follow up with the endocrinologist and get repeat thyroid levels checked in 4-6 with your outpatient provider to ensure you are on the current Levothyroxine dose.

## 2023-03-22 NOTE — DISCHARGE NOTE PROVIDER - CARE PROVIDER_API CALL
Nuno Rushing)  Cardiology  [address] [address]  Phone: [phone]  Fax: [phone]  Follow Up Time: 1 week   Nuno Rushing)  Cardiology  [address] [address]  Phone: [phone]  Fax: [phone]  Established Patient  Scheduled Appointment: 04/18/2023 01:00 PM    [facility],   Phone: [phone]  Fax: (   )    -  Follow Up Time:

## 2023-03-22 NOTE — PROGRESS NOTE ADULT - REASON FOR ADMISSION
AV block

## 2023-03-22 NOTE — DISCHARGE NOTE NURSING/CASE MANAGEMENT/SOCIAL WORK - PATIENT PORTAL LINK FT
You can access the FollowMyHealth Patient Portal offered by [facility] by registering at the following website: [email or web address]. By joining [facility]’s FollowMyHealth portal, you will also be able to view your health information using other applications (apps) compatible with our system.

## 2023-03-22 NOTE — DISCHARGE NOTE PROVIDER - NSDCFUADDAPPT_GEN_ALL_CORE_FT
(1) Please follow up  (1) Please follow up with cardiology, Dr. Rushing   (2) Please call and make an appointment with endocrinology at [phone]  (3) Please follow up with your PMD

## 2023-03-22 NOTE — DISCHARGE NOTE PROVIDER - DID THE PATIENT PRESENT WITH OR WAS TREATED FOR MALNUTRITION DURING THIS ADMISSION
Yes...
ROS: Constitutional- no fever, no chills.  Respiratory- no cough, no SOB  Cardiac- no chest pain, no palpitations, ENT- no rhinorrhea, no sore throat, no congestion.  Abdomen- No nausea, no vomiting, no diarrhea.  Urinary- no dysuria, no urgency, no frequency.  Skin- No rashes

## 2023-03-22 NOTE — CONSULT NOTE ADULT - ASSESSMENT
48 y/o F, with no significant PMH, who presented to [facility] ED on 3/16/23 from Dr. Rushing's office who did an EKG that showed a high degree AV block and LBBB. She was found to have + Lyme C6 Abs and a cMRI showing myocarditis and is currently being empirically treated with abx for Lyme myocarditis. Heart failure is consulted for cardiomyopathy.     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 Would ideally need EMB to differentiate between the two, however the pt has already declined this option. He is s/p IV lasix 70mg during her hospital stay and is currently euvolemic.  - Recommend stopping valsartan and starting Entresto (however, pt is refusing either option secondary to adverse reaction her father had to ACEi.)  - no BB in setting of high degree AV block on admission  - no further diuresis needed  - continue with Farxiga 10mg qD  - abx treatment as per ID    #high degree AV block- now in 1st degree AV block  - plan as per EP      Plan discussed with heart failure attending.  48 y/o F, with no significant PMH, who presented to [facility] ED on 3/16/23 from Dr. Rushing's office who did an EKG that showed a high degree AV block and LBBB. She was found to have + Lyme C6 Abs and a cMRI showing myocarditis and is currently being empirically treated with abx for Lyme myocarditis. Heart failure is consulted for cardiomyopathy.     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 Would ideally need EMB to differentiate between the two, however the pt has already declined this option. He is s/p IV lasix 70mg during her hospital stay and is currently euvolemic.  - Would recommend stopping valsartan and starting Entresto instead. (However, pt is refusing either option secondary to adverse reaction her father had to ACEi. She is interested in alternative medicine instead.)  - no BB in setting of high degree AV block on admission  - no further diuresis needed  - continue with Farxiga 10mg qD  - abx treatment as per ID    #high degree AV block- now in 1st degree AV block  - plan as per EP      Plan discussed with heart failure attending.

## 2023-03-22 NOTE — PROGRESS NOTE ADULT - PROBLEM SELECTOR PLAN 4
Lyme C6 ab (+). ID Consulted  - Patient's cardiac MRI with findings consistent with myocarditis  - Patient w/ Type 2 AV block on ekg and prolonged FL interval subsequently improved to 1st HB   - Transitioned from CTX 2g IV QD to Doxy 100 mg BID PO (last day April 7, 2023)      F: none   E: K >4, Mg >2  N: DASH, TLC, fluid restriction   DVT: pt refusing lovenox   Dispo: pending Lyme Western blot and endo recs     Case discussed with Dr. Rushing

## 2023-03-22 NOTE — CONSULT NOTE ADULT - ATTENDING COMMENTS
48 YO F without significant medical history who presented with a viral prodrome and signs of heart failure and found to have high grade AVB and LVEF 30%. Endomyocardial biopsy was suggested but patient declined. Her Lyme IgG screen was positive and cardiac MRI revealed LVEF 47% with patchy LGE suggestive of myocarditis. Her AV conduction system has normalized after Lyme treatment though still with a left bundle branch block.     Her MRI may be indicative of Lyme myocarditis though it is possible she may have a superimposed viral myocarditis. Given her Lyme exposure risk factors and improvement with ceftraxione, I suspect this all Lyme myocarditis. The only way to distinguish this would be endomyocardial biopsy (with PCR) which does confer risks and she has previously declined this procedure.     She is euvolemic on exam. She has mild dysfunction but declining RAAS inhibition and would defer beta blockers with recent conduction issues. She is on a SGLT2i.     Further management per EP and primary team.

## 2023-03-22 NOTE — DISCHARGE NOTE NURSING/CASE MANAGEMENT/SOCIAL WORK - NSDCPEFALRISK_GEN_ALL_CORE
For information on Fall & Injury Prevention, visit: [email or web address] OR  [email or web address] OR  https://www.cdc.gov/steadi/patient.html

## 2023-03-22 NOTE — DISCHARGE NOTE PROVIDER - NSDCMRMEDTOKEN_GEN_ALL_CORE_FT
alcohol swabs : Apply topically to affected area 4 times a day   glucometer (per patient&#x27;s insurance): Test blood sugars four times a day. Dispense #1 glucometer.  lancets: 1 application subcutaneously 4 times a day   test strips (per patient&#x27;s insurance): 1 application subcutaneously 4 times a day. ** Compatible with patient&#x27;s glucometer **   alcohol swabs : Apply topically to affected area 4 times a day   Farxiga 10 mg oral tablet: 1 tab(s) orally once a day   glucometer (per patient&#x27;s insurance): Test blood sugars four times a day. Dispense #1 glucometer.  lancets: 1 application subcutaneously 4 times a day   test strips (per patient&#x27;s insurance): 1 application subcutaneously 4 times a day. ** Compatible with patient&#x27;s glucometer **   alcohol swabs : Apply topically to affected area 4 times a day   doxycycline monohydrate 50 mg oral capsule: 2 cap(s) orally every 12 hours  glucometer (per patient&#x27;s insurance): Test blood sugars four times a day. Dispense #1 glucometer.  lancets: 1 application subcutaneously 4 times a day   Synthroid 25 mcg (0.025 mg) oral tablet: 1 tab(s) orally once a day  test strips (per patient&#x27;s insurance): 1 application subcutaneously 4 times a day. ** Compatible with patient&#x27;s glucometer **   alcohol swabs : Apply topically to affected area 4 times a day   doxycycline monohydrate 50 mg oral capsule: 2 cap(s) orally every 12 hours  Farxiga 10 mg oral tablet: 1 tab(s) orally every 24 hours  glucometer (per patient&#x27;s insurance): Test blood sugars four times a day. Dispense #1 glucometer.  lancets: 1 application subcutaneously 4 times a day   Synthroid 25 mcg (0.025 mg) oral tablet: 1 tab(s) orally once a day  test strips (per patient&#x27;s insurance): 1 application subcutaneously 4 times a day. ** Compatible with patient&#x27;s glucometer **

## 2023-03-22 NOTE — DISCHARGE NOTE PROVIDER - DETAILS OF MALNUTRITION DIAGNOSIS/DIAGNOSES
This patient has been assessed with a concern for Malnutrition and was treated during this hospitalization for the following Nutrition diagnosis/diagnoses:     -  03/21/2023: Morbid obesity (BMI > 40)

## 2023-03-22 NOTE — PROGRESS NOTE ADULT - SUBJECTIVE AND OBJECTIVE BOX
*************** INCOMPLETE *****************    INFECTIOUS DISEASES CONSULT FOLLOW-UP NOTE    INTERVAL HPI/OVERNIGHT EVENTS:      ROS:   Constitutional, eyes, ENT, cardiovascular, respiratory, gastrointestinal, genitourinary, integumentary, neurological, psychiatric and heme/lymph are otherwise negative other than noted above       ANTIBIOTICS/RELEVANT:    MEDICATIONS  (STANDING):  cefTRIAXone   IVPB 2000 milliGRAM(s) IV Intermittent every 24 hours  dapagliflozin 10 milliGRAM(s) Oral every 24 hours  enoxaparin Injectable 40 milliGRAM(s) SubCutaneous every 12 hours  insulin lispro (ADMELOG) corrective regimen sliding scale   SubCutaneous Before meals and at bedtime  levothyroxine 25 MICROGram(s) Oral daily  valsartan 40 milliGRAM(s) Oral daily    MEDICATIONS  (PRN):        Vital Signs Last 24 Hrs  T(C): 36.7 (22 Mar 2023 06:09), Max: 36.7 (21 Mar 2023 08:49)  T(F): 98 (22 Mar 2023 06:09), Max: 98.1 (21 Mar 2023 08:49)  HR: 80 (22 Mar 2023 05:24) (75 - 83)  BP: 142/59 (22 Mar 2023 05:24) (142/59 - 164/83)  BP(mean): --  RR: 18 (22 Mar 2023 05:24) (18 - 20)  SpO2: 98% (22 Mar 2023 05:24) (95% - 98%)    Parameters below as of 22 Mar 2023 05:24  Patient On (Oxygen Delivery Method): room air        03-21-23 @ 07:01  -  03-22-23 @ 07:00  --------------------------------------------------------  IN: 600 mL / OUT: 1100 mL / NET: -500 mL      PHYSICAL EXAM:  Constitutional: alert, NAD  Eyes: the sclera and conjunctiva were normal.   ENT: the ears and nose were normal in appearance.   Neck: the appearance of the neck was normal and the neck was supple.   Pulmonary: no respiratory distress and lungs were clear to auscultation bilaterally.   Heart: heart rate was normal and rhythm regular, normal S1 and S2  Vascular:. there was no peripheral edema  Abdomen: normal bowel sounds, soft, non-tender  Neurological: no focal deficits.   Psychiatric: the affect was normal        LABS:                        12.3   8.51  )-----------( 369      ( 21 Mar 2023 06:59 )             38.6     03-21    138  |  101  |  11  ----------------------------<  186<H>  4.0   |  25  |  0.56    Ca    9.1      21 Mar 2023 06:59  Phos  4.2     03-21  Mg     1.8     03-21    TPro  6.6  /  Alb  3.6  /  TBili  0.5  /  DBili  x   /  AST  24  /  ALT  33  /  AlkPhos  64  03-21    PT/INR - ( 21 Mar 2023 06:59 )   PT: 12.4 sec;   INR: 1.04          PTT - ( 21 Mar 2023 06:59 )  PTT:30.0 sec      MICROBIOLOGY:      RADIOLOGY & ADDITIONAL STUDIES:  Reviewed INFECTIOUS DISEASES CONSULT FOLLOW-UP NOTE    INTERVAL HPI/OVERNIGHT EVENTS: denies any acute complaints today      ROS:   Constitutional, eyes, ENT, cardiovascular, respiratory, gastrointestinal, genitourinary, integumentary, neurological, psychiatric and heme/lymph are otherwise negative other than noted above       ANTIBIOTICS/RELEVANT:    MEDICATIONS  (STANDING):  cefTRIAXone   IVPB 2000 milliGRAM(s) IV Intermittent every 24 hours  dapagliflozin 10 milliGRAM(s) Oral every 24 hours  enoxaparin Injectable 40 milliGRAM(s) SubCutaneous every 12 hours  insulin lispro (ADMELOG) corrective regimen sliding scale   SubCutaneous Before meals and at bedtime  levothyroxine 25 MICROGram(s) Oral daily  valsartan 40 milliGRAM(s) Oral daily    MEDICATIONS  (PRN):        Vital Signs Last 24 Hrs  T(C): 36.7 (22 Mar 2023 06:09), Max: 36.7 (21 Mar 2023 08:49)  T(F): 98 (22 Mar 2023 06:09), Max: 98.1 (21 Mar 2023 08:49)  HR: 80 (22 Mar 2023 05:24) (75 - 83)  BP: 142/59 (22 Mar 2023 05:24) (142/59 - 164/83)  BP(mean): --  RR: 18 (22 Mar 2023 05:24) (18 - 20)  SpO2: 98% (22 Mar 2023 05:24) (95% - 98%)    Parameters below as of 22 Mar 2023 05:24  Patient On (Oxygen Delivery Method): room air        03-21-23 @ 07:01  -  03-22-23 @ 07:00  --------------------------------------------------------  IN: 600 mL / OUT: 1100 mL / NET: -500 mL      PHYSICAL EXAM:  Constitutional: alert, NAD  Eyes: the sclera and conjunctiva were normal.   ENT: the ears and nose were normal in appearance.   Neck: the appearance of the neck was normal and the neck was supple.   Pulmonary: no respiratory distress and lungs were clear to auscultation bilaterally.   Heart: heart rate was normal and rhythm regular, normal S1 and S2  Vascular:. there was no peripheral edema  Abdomen: normal bowel sounds, soft, non-tender  Neurological: no focal deficits.   Psychiatric: the affect was normal        LABS:                        12.3   8.51  )-----------( 369      ( 21 Mar 2023 06:59 )             38.6     03-21    138  |  101  |  11  ----------------------------<  186<H>  4.0   |  25  |  0.56    Ca    9.1      21 Mar 2023 06:59  Phos  4.2     03-21  Mg     1.8     03-21    TPro  6.6  /  Alb  3.6  /  TBili  0.5  /  DBili  x   /  AST  24  /  ALT  33  /  AlkPhos  64  03-21    PT/INR - ( 21 Mar 2023 06:59 )   PT: 12.4 sec;   INR: 1.04          PTT - ( 21 Mar 2023 06:59 )  PTT:30.0 sec      MICROBIOLOGY:      RADIOLOGY & ADDITIONAL STUDIES:  Reviewed

## 2023-03-23 ENCOUNTER — TRANSCRIPTION ENCOUNTER (OUTPATIENT)
Age: 48
End: 2023-03-23

## 2023-03-23 LAB
ANA TITR SER: NEGATIVE — SIGNIFICANT CHANGE UP
T CRUZI AB SER-ACNC: SIGNIFICANT CHANGE UP

## 2023-03-24 LAB
DEPRECATED S PNEUM 1 IGG SER-MCNC: 5.7 MCG/ML — SIGNIFICANT CHANGE UP
DEPRECATED S PNEUM12 IGG SER-MCNC: <0.4 MCG/ML — SIGNIFICANT CHANGE UP
DEPRECATED S PNEUM14 IGG SER-MCNC: 6.6 MCG/ML — SIGNIFICANT CHANGE UP
DEPRECATED S PNEUM17 IGG SER-MCNC: 6.1 MCG/ML — SIGNIFICANT CHANGE UP
DEPRECATED S PNEUM19 IGG SER-MCNC: 5.7 MCG/ML — SIGNIFICANT CHANGE UP
DEPRECATED S PNEUM19 IGG SER-MCNC: 5.8 MCG/ML — SIGNIFICANT CHANGE UP
DEPRECATED S PNEUM2 IGG SER-MCNC: 0.7 MCG/ML — SIGNIFICANT CHANGE UP
DEPRECATED S PNEUM20 IGG SER-MCNC: 0.9 MCG/ML — SIGNIFICANT CHANGE UP
DEPRECATED S PNEUM22 IGG SER-MCNC: 16.2 MCG/ML — SIGNIFICANT CHANGE UP
DEPRECATED S PNEUM23 IGG SER-MCNC: 15.5 MCG/ML — SIGNIFICANT CHANGE UP
DEPRECATED S PNEUM3 IGG SER-MCNC: 7 MCG/ML — SIGNIFICANT CHANGE UP
DEPRECATED S PNEUM4 IGG SER-MCNC: 4.2 MCG/ML — SIGNIFICANT CHANGE UP
DEPRECATED S PNEUM5 IGG SER-MCNC: 4.3 MCG/ML — SIGNIFICANT CHANGE UP
DEPRECATED S PNEUM8 IGG SER-MCNC: 7 MCG/ML — SIGNIFICANT CHANGE UP
DEPRECATED S PNEUM9 IGG SER-MCNC: 10.2 MCG/ML — SIGNIFICANT CHANGE UP
DEPRECATED S PNEUM9 IGG SER-MCNC: SIGNIFICANT CHANGE UP MCG/ML
S PNEUM SEROTYPE IGG SER-IMP: 0.4 MCG/ML — SIGNIFICANT CHANGE UP
S PNEUM SEROTYPE IGG SER-IMP: 1.2 MCG/ML — SIGNIFICANT CHANGE UP
S PNEUM SEROTYPE IGG SER-IMP: 11.2 MCG/ML — SIGNIFICANT CHANGE UP
S PNEUM SEROTYPE IGG SER-IMP: 2.2 MCG/ML — SIGNIFICANT CHANGE UP
S PNEUM SEROTYPE IGG SER-IMP: 4.1 MCG/ML — SIGNIFICANT CHANGE UP
S PNEUM SEROTYPE IGG SER-IMP: 5.9 MCG/ML — SIGNIFICANT CHANGE UP
S PNEUM SEROTYPE IGG SER-IMP: 6.7 MCG/ML — SIGNIFICANT CHANGE UP

## 2023-03-25 LAB
ECV11 AB TITR SER CF: SIGNIFICANT CHANGE UP
ECV30 AB TITR SER CF: SIGNIFICANT CHANGE UP
ECV4 AB TITR SER CF: SIGNIFICANT CHANGE UP
ECV7 AB TITR SER CF: SIGNIFICANT CHANGE UP
ECV9 AB TITR SER CF: SIGNIFICANT CHANGE UP

## 2023-03-27 DIAGNOSIS — I40.0 INFECTIVE MYOCARDITIS: ICD-10-CM

## 2023-03-27 DIAGNOSIS — E03.8 OTHER SPECIFIED HYPOTHYROIDISM: ICD-10-CM

## 2023-03-27 DIAGNOSIS — I50.21 ACUTE SYSTOLIC (CONGESTIVE) HEART FAILURE: ICD-10-CM

## 2023-03-27 DIAGNOSIS — I44.39 OTHER ATRIOVENTRICULAR BLOCK: ICD-10-CM

## 2023-03-27 DIAGNOSIS — E66.01 MORBID (SEVERE) OBESITY DUE TO EXCESS CALORIES: ICD-10-CM

## 2023-03-27 DIAGNOSIS — A69.20 LYME DISEASE, UNSPECIFIED: ICD-10-CM

## 2023-03-27 DIAGNOSIS — I44.7 LEFT BUNDLE-BRANCH BLOCK, UNSPECIFIED: ICD-10-CM

## 2023-03-27 DIAGNOSIS — E11.65 TYPE 2 DIABETES MELLITUS WITH HYPERGLYCEMIA: ICD-10-CM

## 2023-03-27 DIAGNOSIS — I50.22 CHRONIC SYSTOLIC (CONGESTIVE) HEART FAILURE: ICD-10-CM

## 2023-03-30 LAB
B BURGDOR AB SER-IMP: NEGATIVE — SIGNIFICANT CHANGE UP
B BURGDOR18KD IGG SER QL IB: SIGNIFICANT CHANGE UP
B BURGDOR23KD IGG SER QL IB: SIGNIFICANT CHANGE UP
B BURGDOR23KD IGM SER QL IB: SIGNIFICANT CHANGE UP
B BURGDOR28KD IGG SER QL IB: SIGNIFICANT CHANGE UP
B BURGDOR30KD IGG SER QL IB: SIGNIFICANT CHANGE UP
B BURGDOR31KD IGG SER QL IB: SIGNIFICANT CHANGE UP
B BURGDOR39KD IGG SER QL IB: SIGNIFICANT CHANGE UP
B BURGDOR39KD IGM SER QL IB: SIGNIFICANT CHANGE UP
B BURGDOR41KD IGG SER QL IB: SIGNIFICANT CHANGE UP
B BURGDOR41KD IGM SER QL IB: SIGNIFICANT CHANGE UP
B BURGDOR45KD IGG SER QL IB: SIGNIFICANT CHANGE UP
B BURGDOR58KD IGG SER QL IB: SIGNIFICANT CHANGE UP
B BURGDOR66KD IGG SER QL IB: SIGNIFICANT CHANGE UP
B BURGDOR93KD IGG SER QL IB: SIGNIFICANT CHANGE UP
LYME WB IGM INTERPRETATION: NEGATIVE — SIGNIFICANT CHANGE UP

## 2023-04-02 ENCOUNTER — INPATIENT (INPATIENT)
Facility: HOSPITAL | Age: 48
LOS: 1 days | Discharge: HOME | DRG: 229 | End: 2023-04-04
Attending: HOSPITALIST | Admitting: HOSPITALIST
Payer: COMMERCIAL

## 2023-04-02 VITALS
DIASTOLIC BLOOD PRESSURE: 75 MMHG | HEART RATE: 54 BPM | HEIGHT: 62 IN | TEMPERATURE: 98 F | OXYGEN SATURATION: 97 % | WEIGHT: 265 LBS | SYSTOLIC BLOOD PRESSURE: 156 MMHG | RESPIRATION RATE: 17 BRPM

## 2023-04-02 DIAGNOSIS — Z98.890 OTHER SPECIFIED POSTPROCEDURAL STATES: Chronic | ICD-10-CM

## 2023-04-02 DIAGNOSIS — Z98.891 HISTORY OF UTERINE SCAR FROM PREVIOUS SURGERY: Chronic | ICD-10-CM

## 2023-04-02 LAB
ALBUMIN SERPL ELPH-MCNC: 4.3 G/DL — SIGNIFICANT CHANGE UP (ref 3.3–5)
ALP SERPL-CCNC: 63 U/L — SIGNIFICANT CHANGE UP (ref 40–120)
ALT FLD-CCNC: 47 U/L — HIGH (ref 10–45)
ANION GAP SERPL CALC-SCNC: 13 MMOL/L — SIGNIFICANT CHANGE UP (ref 5–17)
APPEARANCE UR: CLEAR — SIGNIFICANT CHANGE UP
APTT BLD: 34 SEC — SIGNIFICANT CHANGE UP (ref 27.5–35.5)
AST SERPL-CCNC: 55 U/L — HIGH (ref 10–40)
BASOPHILS # BLD AUTO: 0.07 K/UL — SIGNIFICANT CHANGE UP (ref 0–0.2)
BASOPHILS NFR BLD AUTO: 0.6 % — SIGNIFICANT CHANGE UP (ref 0–2)
BILIRUB DIRECT SERPL-MCNC: <0.2 MG/DL — SIGNIFICANT CHANGE UP (ref 0–0.3)
BILIRUB INDIRECT FLD-MCNC: SIGNIFICANT CHANGE UP MG/DL (ref 0.2–1)
BILIRUB SERPL-MCNC: 0.5 MG/DL — SIGNIFICANT CHANGE UP (ref 0.2–1.2)
BILIRUB UR-MCNC: NEGATIVE — SIGNIFICANT CHANGE UP
BUN SERPL-MCNC: 14 MG/DL — SIGNIFICANT CHANGE UP (ref 7–23)
CALCIUM SERPL-MCNC: 10.1 MG/DL — SIGNIFICANT CHANGE UP (ref 8.4–10.5)
CHLORIDE SERPL-SCNC: 103 MMOL/L — SIGNIFICANT CHANGE UP (ref 96–108)
CO2 SERPL-SCNC: 24 MMOL/L — SIGNIFICANT CHANGE UP (ref 22–31)
COLOR SPEC: YELLOW — SIGNIFICANT CHANGE UP
CREAT SERPL-MCNC: 0.59 MG/DL — SIGNIFICANT CHANGE UP (ref 0.5–1.3)
DIFF PNL FLD: NEGATIVE — SIGNIFICANT CHANGE UP
EGFR: 112 ML/MIN/1.73M2 — SIGNIFICANT CHANGE UP
EOSINOPHIL # BLD AUTO: 0.13 K/UL — SIGNIFICANT CHANGE UP (ref 0–0.5)
EOSINOPHIL NFR BLD AUTO: 1.1 % — SIGNIFICANT CHANGE UP (ref 0–6)
GLUCOSE BLDC GLUCOMTR-MCNC: 159 MG/DL — HIGH (ref 70–99)
GLUCOSE SERPL-MCNC: 182 MG/DL — HIGH (ref 70–99)
GLUCOSE UR QL: NEGATIVE — SIGNIFICANT CHANGE UP
HCT VFR BLD CALC: 39.5 % — SIGNIFICANT CHANGE UP (ref 34.5–45)
HGB BLD-MCNC: 12.7 G/DL — SIGNIFICANT CHANGE UP (ref 11.5–15.5)
IMM GRANULOCYTES NFR BLD AUTO: 0.2 % — SIGNIFICANT CHANGE UP (ref 0–0.9)
INR BLD: 1.13 — SIGNIFICANT CHANGE UP (ref 0.88–1.16)
KETONES UR-MCNC: NEGATIVE — SIGNIFICANT CHANGE UP
LEUKOCYTE ESTERASE UR-ACNC: NEGATIVE — SIGNIFICANT CHANGE UP
LYMPHOCYTES # BLD AUTO: 28.8 % — SIGNIFICANT CHANGE UP (ref 13–44)
LYMPHOCYTES # BLD AUTO: 3.45 K/UL — HIGH (ref 1–3.3)
MAGNESIUM SERPL-MCNC: 1.8 MG/DL — SIGNIFICANT CHANGE UP (ref 1.6–2.6)
MCHC RBC-ENTMCNC: 30 PG — SIGNIFICANT CHANGE UP (ref 27–34)
MCHC RBC-ENTMCNC: 32.2 GM/DL — SIGNIFICANT CHANGE UP (ref 32–36)
MCV RBC AUTO: 93.4 FL — SIGNIFICANT CHANGE UP (ref 80–100)
MONOCYTES # BLD AUTO: 0.8 K/UL — SIGNIFICANT CHANGE UP (ref 0–0.9)
MONOCYTES NFR BLD AUTO: 6.7 % — SIGNIFICANT CHANGE UP (ref 2–14)
NEUTROPHILS # BLD AUTO: 7.5 K/UL — HIGH (ref 1.8–7.4)
NEUTROPHILS NFR BLD AUTO: 62.6 % — SIGNIFICANT CHANGE UP (ref 43–77)
NITRITE UR-MCNC: NEGATIVE — SIGNIFICANT CHANGE UP
NRBC # BLD: 0 /100 WBCS — SIGNIFICANT CHANGE UP (ref 0–0)
NT-PROBNP SERPL-SCNC: 1352 PG/ML — HIGH (ref 0–300)
PH UR: 6 — SIGNIFICANT CHANGE UP (ref 5–8)
PLATELET # BLD AUTO: 427 K/UL — HIGH (ref 150–400)
POTASSIUM SERPL-MCNC: 3.9 MMOL/L — SIGNIFICANT CHANGE UP (ref 3.5–5.3)
POTASSIUM SERPL-SCNC: 3.9 MMOL/L — SIGNIFICANT CHANGE UP (ref 3.5–5.3)
PROT SERPL-MCNC: 7.5 G/DL — SIGNIFICANT CHANGE UP (ref 6–8.3)
PROT UR-MCNC: NEGATIVE MG/DL — SIGNIFICANT CHANGE UP
PROTHROM AB SERPL-ACNC: 13.5 SEC — HIGH (ref 10.5–13.4)
RBC # BLD: 4.23 M/UL — SIGNIFICANT CHANGE UP (ref 3.8–5.2)
RBC # FLD: 15.3 % — HIGH (ref 10.3–14.5)
SARS-COV-2 RNA SPEC QL NAA+PROBE: SIGNIFICANT CHANGE UP
SODIUM SERPL-SCNC: 140 MMOL/L — SIGNIFICANT CHANGE UP (ref 135–145)
SP GR SPEC: 1.02 — SIGNIFICANT CHANGE UP (ref 1–1.03)
TROPONIN T SERPL-MCNC: 0.01 NG/ML — SIGNIFICANT CHANGE UP (ref 0–0.01)
UROBILINOGEN FLD QL: 0.2 E.U./DL — SIGNIFICANT CHANGE UP
WBC # BLD: 11.97 K/UL — HIGH (ref 3.8–10.5)
WBC # FLD AUTO: 11.97 K/UL — HIGH (ref 3.8–10.5)

## 2023-04-02 PROCEDURE — 99223 1ST HOSP IP/OBS HIGH 75: CPT

## 2023-04-02 PROCEDURE — 99285 EMERGENCY DEPT VISIT HI MDM: CPT

## 2023-04-02 PROCEDURE — 71045 X-RAY EXAM CHEST 1 VIEW: CPT | Mod: 26

## 2023-04-02 RX ORDER — SODIUM CHLORIDE 9 MG/ML
1000 INJECTION, SOLUTION INTRAVENOUS
Refills: 0 | Status: DISCONTINUED | OUTPATIENT
Start: 2023-04-02 | End: 2023-04-04

## 2023-04-02 RX ORDER — DEXTROSE 50 % IN WATER 50 %
25 SYRINGE (ML) INTRAVENOUS ONCE
Refills: 0 | Status: DISCONTINUED | OUTPATIENT
Start: 2023-04-02 | End: 2023-04-04

## 2023-04-02 RX ORDER — CEFTRIAXONE 500 MG/1
2000 INJECTION, POWDER, FOR SOLUTION INTRAMUSCULAR; INTRAVENOUS EVERY 24 HOURS
Refills: 0 | Status: DISCONTINUED | OUTPATIENT
Start: 2023-04-02 | End: 2023-04-04

## 2023-04-02 RX ORDER — LEVOTHYROXINE SODIUM 125 MCG
25 TABLET ORAL DAILY
Refills: 0 | Status: DISCONTINUED | OUTPATIENT
Start: 2023-04-03 | End: 2023-04-04

## 2023-04-02 RX ORDER — DEXTROSE 50 % IN WATER 50 %
12.5 SYRINGE (ML) INTRAVENOUS ONCE
Refills: 0 | Status: DISCONTINUED | OUTPATIENT
Start: 2023-04-02 | End: 2023-04-04

## 2023-04-02 RX ORDER — GLUCAGON INJECTION, SOLUTION 0.5 MG/.1ML
1 INJECTION, SOLUTION SUBCUTANEOUS ONCE
Refills: 0 | Status: DISCONTINUED | OUTPATIENT
Start: 2023-04-02 | End: 2023-04-04

## 2023-04-02 RX ORDER — INSULIN LISPRO 100/ML
VIAL (ML) SUBCUTANEOUS
Refills: 0 | Status: DISCONTINUED | OUTPATIENT
Start: 2023-04-02 | End: 2023-04-04

## 2023-04-02 RX ORDER — DEXTROSE 50 % IN WATER 50 %
15 SYRINGE (ML) INTRAVENOUS ONCE
Refills: 0 | Status: DISCONTINUED | OUTPATIENT
Start: 2023-04-02 | End: 2023-04-04

## 2023-04-02 RX ORDER — CHLORHEXIDINE GLUCONATE 213 G/1000ML
1 SOLUTION TOPICAL
Refills: 0 | Status: DISCONTINUED | OUTPATIENT
Start: 2023-04-02 | End: 2023-04-04

## 2023-04-02 RX ORDER — LOSARTAN POTASSIUM 100 MG/1
25 TABLET, FILM COATED ORAL EVERY 24 HOURS
Refills: 0 | Status: DISCONTINUED | OUTPATIENT
Start: 2023-04-02 | End: 2023-04-04

## 2023-04-02 RX ADMIN — CEFTRIAXONE 100 MILLIGRAM(S): 500 INJECTION, POWDER, FOR SOLUTION INTRAMUSCULAR; INTRAVENOUS at 22:57

## 2023-04-02 NOTE — H&P ADULT - NSHPREVIEWOFSYSTEMS_GEN_ALL_CORE
REVIEW OF SYSTEMS:  CONSTITUTIONAL: ++ weakness, no fevers, chills, changes in weight  EYES/ENT: No visual changes;  No vertigo or throat pain   NECK: No pain or stiffness  RESPIRATORY: No cough, wheezing, hemoptysis; ++ shortness of breath  CARDIOVASCULAR: no chest pain or palpitations  GASTROINTESTINAL: No abdominal or epigastric pain. No nausea, vomiting, or hematemesis; No diarrhea or constipation. No melena or hematochezia.  GENITOURINARY: No dysuria, frequency or hematuria  NEUROLOGICAL: No numbness or weakness  SKIN: No itching, rashes

## 2023-04-02 NOTE — ED PROVIDER NOTE - CLINICAL SUMMARY MEDICAL DECISION MAKING FREE TEXT BOX
being treated for lyme disease now with 3rd degree heart block on ekg. bp stable but notes generalized weakness, shortness of breath with exertion.  labs/cxr/ecg ordered  cardiology consulted being treated for lyme disease now with 3rd degree heart block on ekg. bp stable but notes generalized weakness, shortness of breath with exertion.  labs/cxr/ecg ordered. r/o heart failure, acs, electrolyte abnormality. suspect related to lyme.  cardiology consulted  admit to [facility]cu

## 2023-04-02 NOTE — H&P ADULT - NSICDXPASTMEDICALHX_GEN_ALL_CORE_FT
PAST MEDICAL HISTORY:  Diabetes     History of myocarditis     Lyme disease     Third degree AV block

## 2023-04-02 NOTE — H&P ADULT - HISTORY OF PRESENT ILLNESS
48 y/o F w/ PMH of suspected lyme myocarditis, DM, and 3rd degree AV block, recently admitted to [facility] CCU for high degree AV block and LBBB 2/2 suspected lyme myocarditis s/p CTX with improvement in heart block, discharged on PO Doxycycline 100 mg Q12 hours until April 7, 2023. EP recommending PPM on last admission as well as biopsy to rule out lymphocytic myocarditis, however, patient declined both interventions at time of discharge.     Patient p/w 24 hours of generalized weakness, lightheadedness, shortness of breath, and dyspnea on exertion. Outpatient EKG performed on 4/2 and sent to her cardiologist Dr. Su who recommended evaluation in the ED for complete heart block with alternating RBBB and LBBB. Patient denies any chest pain, n/v/d, rashes, dizziness, focal weakness, cough, or dysuria.     In the ED:  Initial vital signs: T: 97.8 F, HR: 52, BP: 156/75, R: 16, SpO2: 97% on RA  Labs: significant for WBC 11.97, neutrophils 7.5, BNP 1352  CXR: no acute pathology  EKG: 3rd degree AV block, alternating RBBB and LBBB  Consults: CCU

## 2023-04-02 NOTE — ED ADULT TRIAGE NOTE - CHIEF COMPLAINT QUOTE
Pt sent for eval by cardiologist d/t 3rd degree heart block on EKG. Pt recent tx for lyme myocarditis. Pt co weakness, denies other associated sx.

## 2023-04-02 NOTE — ED ADULT TRIAGE NOTE - HISTORY OF COVID-19 VACCINATION
"Chief Complaint   Patient presents with   • Palpitations     started Sunday morning, \"can feel it beating through my chest\"   • Dizziness     \"have to sit down and take deep breaths\"     /105   Pulse (!) 106   Temp 36.4 °C (97.6 °F) (Temporal)   Resp 15   Ht 1.702 m (5' 7\")   Wt 84.7 kg (186 lb 11.7 oz)   SpO2 99%   BMI 29.25 kg/m²     Pt ambulated to ED w/ friend for c/o palpitations leading to dizziness started on Sunday.  Pt states stopped Marijuana two weeks ago and did drink alcohol this weekend \"which I don't normally do.\"    Covid Screen Negative.    KAVEH BP:  138/95  " Yes

## 2023-04-02 NOTE — ED PROVIDER NOTE - OBJECTIVE STATEMENT
history of lyme disease, currently being treated with doxycycline, diabetes, here with generalized weakness, lightheadedness, shortness of breath with exertion. Was in the hospital a few weeks ago with similar, told she had high degree av block that improved to1st degree. Seen by EP and told didn't need pacemaker at that time. Today, symptoms seemed to worsen so had another ekg done which was sent to cardiologist and advised to come to ED. Denies chest pain, fever, chills. Taking meds as prescribed.

## 2023-04-02 NOTE — H&P ADULT - NSHPPHYSICALEXAM_GEN_ALL_CORE
VITAL SIGNS:  T(C): 36.6 (04-02-23 @ 19:01), Max: 36.6 (04-02-23 @ 19:01)  T(F): 97.8 (04-02-23 @ 19:01), Max: 97.8 (04-02-23 @ 19:01)  HR: 40 (04-03-23 @ 00:00) (40 - 54)  BP: 139/60 (04-03-23 @ 00:00) (139/60 - 166/78)  BP(mean): 86 (04-03-23 @ 00:00) (86 - 107)  RR: 16 (04-03-23 @ 00:00) (16 - 18)  SpO2: 97% (04-03-23 @ 00:00) (97% - 98%)  Wt(kg): --    PHYSICAL EXAM:  Constitutional: obese, middle aged female, resting comfortably in bed, NAD  HEENT: NC/AT, PER, anicteric sclera, no nasal discharge; MMM  Neck: supple; no JVD or thyromegaly, no lymphadenopathy  Respiratory: CTA B/L; no W/R/R, no retractions  Cardiac: +S1/S2; regular rate, irregular rhythm, no audible murmurs  Gastrointestinal: soft, round, NT/ND; no rebound or guarding  Extremities: WWP, no clubbing or cyanosis; 1+ pitting LE edema to mid-shins b/l, +varicose veins, chronic appearing skin changes  Musculoskeletal: NROM x4; no joint swelling, tenderness or erythema  Vascular: 2+ DP pulses  Neurologic: AAOx3; CNII-XII grossly intact; no focal deficits  Psychiatric: affect and characteristics of appearance, verbalizations, behaviors are appropriate

## 2023-04-02 NOTE — H&P ADULT - NSHPLABSRESULTS_GEN_ALL_CORE
LABS:                         12.7   11.97 )-----------( 427      ( 2023 19:24 )             39.5     04-02    140  |  103  |  14  ----------------------------<  182<H>  3.9   |  24  |  0.59    Ca    10.1      2023 19:24  Mg     1.8     04-02    TPro  7.5  /  Alb  4.3  /  TBili  0.5  /  DBili  <0.2  /  AST  55<H>  /  ALT  47<H>  /  AlkPhos  63  04-02    PT/INR - ( 2023 19:24 )   PT: 13.5 sec;   INR: 1.13          PTT - ( 2023 19:24 )  PTT:34.0 sec  Urinalysis Basic - ( 2023 23:00 )    Color: Yellow / Appearance: Clear / S.025 / pH: x  Gluc: x / Ketone: NEGATIVE  / Bili: Negative / Urobili: 0.2 E.U./dL   Blood: x / Protein: NEGATIVE mg/dL / Nitrite: NEGATIVE   Leuk Esterase: NEGATIVE / RBC: x / WBC x   Sq Epi: x / Non Sq Epi: x / Bacteria: x      CARDIAC MARKERS ( 2023 19:24 )  x     / 0.01 ng/mL / x     / x     / x                RADIOLOGY, EKG & ADDITIONAL TESTS: Reviewed.

## 2023-04-02 NOTE — PATIENT PROFILE ADULT - FALL HARM RISK - HARM RISK INTERVENTIONS

## 2023-04-02 NOTE — ED ADULT NURSE NOTE - PHONE #
Called patient to make her an appointment to go over her MRI results from [employer]. Left message with callback number and told patient to call office to make appointment.
Patient returned our call.  She is scheduled for 03/21/2022
[phone] C, [phone] H

## 2023-04-02 NOTE — H&P ADULT - ASSESSMENT
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 w/ outpatient EKG showing complete heart block with alternating RBBB and LBBB, admitted for PPM placement.     NEURO  TRA    PULM  #PEREIRA/SOB  Symptoms resolved upon arrival to CCU. CXR w/o evidence of pulmonary edema, no crackles or LE edema. Satting well on RA.    CARDIOVASCULAR  #3rd Degree AV Block  #Chronic HFrEF  #Acute myocarditis    Symptomatic complete  AV block w/ alternating RBBB and LBBB likely 2/2 myocarditis presumed to be Lyme disease due to Lyme titers 3/17: (+) Lyme C6 ab, however, Western Blot negative on 3/18.   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 Pt refused cardiac biopsy to differentiate between lyme carditis and lymphocytic myocarditis on last admission.  Discharged on Doxycycline 100 mg BID x3 weeks (through 4/7/23) and Levothyroxine 25 mcg daily.   Per patient, was not taking losartan, farxiga, or synthroid at home.     - c/w CTX  - refusing losartan at this time  - EP consult in AM, plan for PPM which patient is now amenable for  - NPO for procedure  - ID consult in AM    ENDO  #DM  A1c 9.8, not on any home meds, refusing inpatient sliding scale.    #Hypothyroidism  TSH 7.15 on last admission, pt has not been compliant with home synthroid. Refusing synthroid inpatient    -repeat TSH    GI  TRA    RENAL  TRA    HEME  TRA    ID  #Lyme Disease  Lyme C6 ab (+), Western Blot negative on 3/18  - c/w CTX 2g QD, pending ID consult in AM    MISC  F: None   E: Replete PRN   N: DASH/TLC   DVT ppx: holding for possible PPM on 4/3 AM  Dispo: CCU

## 2023-04-03 ENCOUNTER — FORM ENCOUNTER (OUTPATIENT)
Age: 48
End: 2023-04-03

## 2023-04-03 LAB
ALBUMIN SERPL ELPH-MCNC: 3.8 G/DL — SIGNIFICANT CHANGE UP (ref 3.3–5)
ALP SERPL-CCNC: 55 U/L — SIGNIFICANT CHANGE UP (ref 40–120)
ALT FLD-CCNC: 43 U/L — SIGNIFICANT CHANGE UP (ref 10–45)
ANION GAP SERPL CALC-SCNC: 12 MMOL/L — SIGNIFICANT CHANGE UP (ref 5–17)
APTT BLD: 31.2 SEC — SIGNIFICANT CHANGE UP (ref 27.5–35.5)
AST SERPL-CCNC: 39 U/L — SIGNIFICANT CHANGE UP (ref 10–40)
BASOPHILS # BLD AUTO: 0.06 K/UL — SIGNIFICANT CHANGE UP (ref 0–0.2)
BASOPHILS NFR BLD AUTO: 0.5 % — SIGNIFICANT CHANGE UP (ref 0–2)
BILIRUB SERPL-MCNC: 0.4 MG/DL — SIGNIFICANT CHANGE UP (ref 0.2–1.2)
BUN SERPL-MCNC: 12 MG/DL — SIGNIFICANT CHANGE UP (ref 7–23)
CALCIUM SERPL-MCNC: 9.1 MG/DL — SIGNIFICANT CHANGE UP (ref 8.4–10.5)
CHLORIDE SERPL-SCNC: 103 MMOL/L — SIGNIFICANT CHANGE UP (ref 96–108)
CO2 SERPL-SCNC: 23 MMOL/L — SIGNIFICANT CHANGE UP (ref 22–31)
CREAT SERPL-MCNC: 0.62 MG/DL — SIGNIFICANT CHANGE UP (ref 0.5–1.3)
EGFR: 110 ML/MIN/1.73M2 — SIGNIFICANT CHANGE UP
EOSINOPHIL # BLD AUTO: 0.16 K/UL — SIGNIFICANT CHANGE UP (ref 0–0.5)
EOSINOPHIL NFR BLD AUTO: 1.5 % — SIGNIFICANT CHANGE UP (ref 0–6)
GLUCOSE BLDC GLUCOMTR-MCNC: 119 MG/DL — HIGH (ref 70–99)
GLUCOSE BLDC GLUCOMTR-MCNC: 126 MG/DL — HIGH (ref 70–99)
GLUCOSE BLDC GLUCOMTR-MCNC: 147 MG/DL — HIGH (ref 70–99)
GLUCOSE BLDC GLUCOMTR-MCNC: 150 MG/DL — HIGH (ref 70–99)
GLUCOSE SERPL-MCNC: 167 MG/DL — HIGH (ref 70–99)
HCT VFR BLD CALC: 37.6 % — SIGNIFICANT CHANGE UP (ref 34.5–45)
HGB BLD-MCNC: 12 G/DL — SIGNIFICANT CHANGE UP (ref 11.5–15.5)
IMM GRANULOCYTES NFR BLD AUTO: 0.2 % — SIGNIFICANT CHANGE UP (ref 0–0.9)
INR BLD: 1.15 — SIGNIFICANT CHANGE UP (ref 0.88–1.16)
LACTATE SERPL-SCNC: 1.1 MMOL/L — SIGNIFICANT CHANGE UP (ref 0.5–2)
LYMPHOCYTES # BLD AUTO: 3.5 K/UL — HIGH (ref 1–3.3)
LYMPHOCYTES # BLD AUTO: 31.8 % — SIGNIFICANT CHANGE UP (ref 13–44)
MAGNESIUM SERPL-MCNC: 1.7 MG/DL — SIGNIFICANT CHANGE UP (ref 1.6–2.6)
MCHC RBC-ENTMCNC: 30.1 PG — SIGNIFICANT CHANGE UP (ref 27–34)
MCHC RBC-ENTMCNC: 31.9 GM/DL — LOW (ref 32–36)
MCV RBC AUTO: 94.2 FL — SIGNIFICANT CHANGE UP (ref 80–100)
MONOCYTES # BLD AUTO: 0.75 K/UL — SIGNIFICANT CHANGE UP (ref 0–0.9)
MONOCYTES NFR BLD AUTO: 6.8 % — SIGNIFICANT CHANGE UP (ref 2–14)
NEUTROPHILS # BLD AUTO: 6.51 K/UL — SIGNIFICANT CHANGE UP (ref 1.8–7.4)
NEUTROPHILS NFR BLD AUTO: 59.2 % — SIGNIFICANT CHANGE UP (ref 43–77)
NRBC # BLD: 0 /100 WBCS — SIGNIFICANT CHANGE UP (ref 0–0)
PHOSPHATE SERPL-MCNC: 3.7 MG/DL — SIGNIFICANT CHANGE UP (ref 2.5–4.5)
PLATELET # BLD AUTO: 375 K/UL — SIGNIFICANT CHANGE UP (ref 150–400)
POTASSIUM SERPL-MCNC: 3.9 MMOL/L — SIGNIFICANT CHANGE UP (ref 3.5–5.3)
POTASSIUM SERPL-SCNC: 3.9 MMOL/L — SIGNIFICANT CHANGE UP (ref 3.5–5.3)
PROT SERPL-MCNC: 6.9 G/DL — SIGNIFICANT CHANGE UP (ref 6–8.3)
PROTHROM AB SERPL-ACNC: 13.7 SEC — HIGH (ref 10.5–13.4)
RBC # BLD: 3.99 M/UL — SIGNIFICANT CHANGE UP (ref 3.8–5.2)
RBC # FLD: 15 % — HIGH (ref 10.3–14.5)
SODIUM SERPL-SCNC: 138 MMOL/L — SIGNIFICANT CHANGE UP (ref 135–145)
T3FREE SERPL-MCNC: 2.47 PG/ML — SIGNIFICANT CHANGE UP (ref 2–4.4)
T4 FREE SERPL-MCNC: 1.22 NG/DL — SIGNIFICANT CHANGE UP (ref 0.93–1.7)
TSH SERPL-MCNC: 12 UIU/ML — HIGH (ref 0.27–4.2)
WBC # BLD: 11 K/UL — HIGH (ref 3.8–10.5)
WBC # FLD AUTO: 11 K/UL — HIGH (ref 3.8–10.5)

## 2023-04-03 PROCEDURE — 99291 CRITICAL CARE FIRST HOUR: CPT | Mod: 25

## 2023-04-03 PROCEDURE — 99223 1ST HOSP IP/OBS HIGH 75: CPT | Mod: 57

## 2023-04-03 PROCEDURE — 71045 X-RAY EXAM CHEST 1 VIEW: CPT | Mod: 26

## 2023-04-03 PROCEDURE — 99222 1ST HOSP IP/OBS MODERATE 55: CPT

## 2023-04-03 PROCEDURE — 93306 TTE W/DOPPLER COMPLETE: CPT | Mod: 26

## 2023-04-03 PROCEDURE — 33274 TCAT INSJ/RPL PERM LDLS PM: CPT | Mod: Q0

## 2023-04-03 RX ORDER — POTASSIUM CHLORIDE 20 MEQ
10 PACKET (EA) ORAL ONCE
Refills: 0 | Status: COMPLETED | OUTPATIENT
Start: 2023-04-03 | End: 2023-04-03

## 2023-04-03 RX ORDER — MAGNESIUM SULFATE 500 MG/ML
2 VIAL (ML) INJECTION ONCE
Refills: 0 | Status: COMPLETED | OUTPATIENT
Start: 2023-04-03 | End: 2023-04-03

## 2023-04-03 RX ORDER — POTASSIUM CHLORIDE 20 MEQ
10 PACKET (EA) ORAL ONCE
Refills: 0 | Status: DISCONTINUED | OUTPATIENT
Start: 2023-04-03 | End: 2023-04-03

## 2023-04-03 RX ORDER — POTASSIUM CHLORIDE 20 MEQ
20 PACKET (EA) ORAL ONCE
Refills: 0 | Status: COMPLETED | OUTPATIENT
Start: 2023-04-03 | End: 2023-04-03

## 2023-04-03 RX ADMIN — Medication 25 GRAM(S): at 04:38

## 2023-04-03 RX ADMIN — Medication 20 MILLIEQUIVALENT(S): at 05:15

## 2023-04-03 RX ADMIN — Medication 25 MICROGRAM(S): at 06:09

## 2023-04-03 RX ADMIN — CHLORHEXIDINE GLUCONATE 1 APPLICATION(S): 213 SOLUTION TOPICAL at 05:05

## 2023-04-03 RX ADMIN — CEFTRIAXONE 100 MILLIGRAM(S): 500 INJECTION, POWDER, FOR SOLUTION INTRAMUSCULAR; INTRAVENOUS at 21:18

## 2023-04-03 NOTE — CONSULT NOTE ADULT - SUBJECTIVE AND OBJECTIVE BOX
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 Outpatient EKG performed on  and sent to her cardiologist Dr. Su who recommended evaluation in the ED for complete heart block with alternating RBBB and LBBB. Patient denies any chest pain, n/v/d, rashes, dizziness, focal weakness, cough, or dysuria.     In the ED, vital signs: T: 97.8 F, HR: 52, BP: 156/75, R: 16, SpO2: 97% on RA; Labs: significant for WBC 11.97, neutrophils 7.5, BNP 1352; CXR: no acute findings.    In CCU,     PAST MEDICAL & SURGICAL HISTORY:  Lyme disease  Diabetes  History of myocarditis  Third degree AV block  s/P   s/P hernia repair    Social History: no smoking, no drugs, no alcohol    Inpatient Medications:   cefTRIAXone   IVPB 2000 milliGRAM(s) IV Intermittent every 24 hours  insulin lispro (ADMELOG) corrective regimen sliding scale   SubCutaneous Before meals and at bedtime  levothyroxine 25 MICROGram(s) Oral daily  losartan 25 milliGRAM(s) Oral every 24 hours    Allergies: No Known Allergies    ROS:   CONSTITUTIONAL: No fever, weight loss + fatigue  EYES: Pt denies  RESPIRATORY: No cough, wheezing, chills or hemoptysis; +SOB  CARDIOVASCULAR: see HPI  GASTROINTESTINAL: Pt denies  NEUROLOGICAL: Pt denies  SKIN: Pt denies   PSYCHIATRIC: Pt denies  HEME/LYMPH: Pt denies    PHYSICAL:  T(C): 36.6 (23 @ 09:00), Max: 37.1 (23 @ 01:12)  HR: 41 (23 @ 13:00) (39 - 54)  BP: 138/60 (23 @ 13:00) (110/56 - 166/78)  RR: 28 (23 @ 13:00) (16 - 44)  SpO2: 98% (04-03-23 @ 13:00) (93% - 99%)    Appearance: No acute distress, well developed  Eyes: normal appearing conjunctiva, pupils and eyelids  Cardiovascular: Normal S1 S2, No JVD, No murmurs, No edema  Respiratory: CTA bilaterally.  No wheeze, rhonchi, rales noted  Gastrointestinal:  Soft, NT/ND 	  Neurologic:  A&Ox3, normal mood/affect  Skin: no rash noted, normal color and pigmentation.        LABS:                        12.0   11.00 )-----------( 375      ( 2023 03:19 )             37.6     04-03    138  |  103  |  12  --------------------<  167<H>  3.9   |  23  |  0.62    Ca    9.1      2023 03:19  Phos  3.7     04-03  Mg     1.7     04-03    TPro  6.9  /  Alb  3.8  /  TBili  0.4  /  DBili  x   /  AST  39  /  ALT  43  /  AlkPhos  55  04-03    PT/INR - ( 2023 03:19 )   PT: 13.7 sec;   INR: 1.15     PTT - ( 2023 03:19 )  PTT:31.2 sec        EKG:    Telemetry:    TTE Echo Complete w/ Contrast w/ Doppler (23 @ 09:19) >  CONCLUSIONS:   1. Moderately reduced left ventricular systolic function, ejection fraction  35-40%.   2. Global left ventricular hypokinesis.   3. Normal right ventricular size and systolic function.   4. Normal atria.   5. No significant valvular disease.   6. Pulmonary hypertension present, pulmonary artery systolic pressure is 47 mmHg.   7. Trivial pericardial effusion.   8. Compared to the previous TTE performed on 3/17/2023, the pulmonary artery systolic pressure is higher.    TTE Echo Complete w/ Contrast w/o Doppler (23 @ 10:45) >  CONCLUSIONS:   1. Moderately reduced left ventricular systolic function with global hypokinesis.   2. Normal right ventricular size and systolic function.   3. No significant valvular disease.   4. Mild pulmonary hypertension.   5. No pericardial effusion.  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     Of note, outpatient EKG performed on  and sent to her cardiologist Dr. Su who recommended evaluation in the ED for high grade heart block with alternating RBBB and LBBB. Patient denies any chest pain, n/v/d, rashes, dizziness, focal weakness, cough, or dysuria.     In the ED, vital signs: T: 97.8 F, HR: 52, BP: 156/75, R: 16, SpO2: 97% on RA; Labs: significant for WBC 11.97, neutrophils 7.5, BNP 1352; CXR: no acute findings.    In CCU, started ceftriaxone 2g q24 for lyme coverage. Today ID re-consulted for lyme given prior neg western blot, recommend c/w CTX & repeat lyme studies. TTE w/ mod reduced LF systolic function, EF 35-40%, global LV hypokinesis. EP consulted for PPM. EKGs show Wenckebach and high grade HB with alternating LBBB (yesterday) and RBBB (today).     PAST MEDICAL & SURGICAL HISTORY:  Lyme disease  Diabetes  History of myocarditis  Third degree AV block  s/P   s/P hernia repair    Social History: no smoking, no drugs, no alcohol    Inpatient Medications:   cefTRIAXone   IVPB 2000 milliGRAM(s) IV Intermittent every 24 hours  insulin lispro (ADMELOG) corrective regimen sliding scale   SubCutaneous Before meals and at bedtime  levothyroxine 25 MICROGram(s) Oral daily  losartan 25 milliGRAM(s) Oral every 24 hours    Allergies: No Known Allergies    ROS:   CONSTITUTIONAL: No fever, weight loss + fatigue  EYES: Pt denies  RESPIRATORY: No cough, wheezing, chills or hemoptysis; +SOB  CARDIOVASCULAR: see HPI  GASTROINTESTINAL: Pt denies  NEUROLOGICAL: Pt denies  SKIN: Pt denies   PSYCHIATRIC: Pt denies  HEME/LYMPH: Pt denies    PHYSICAL:  T(C): 36.6 (23 @ 09:00), Max: 37.1 (23 @ 01:12)  HR: 41 (23 @ 13:00) (39 - 54)  BP: 138/60 (23 @ 13:00) (110/56 - 166/78)  RR: 28 (23 @ 13:00) (16 - 44)  SpO2: 98% (23 @ 13:00) (93% - 99%)    Appearance: No acute distress, well developed  Eyes: normal appearing conjunctiva, pupils and eyelids  Cardiovascular: Normal S1 S2, No JVD, No murmurs, No edema  Respiratory: CTA bilaterally.  No wheeze, rhonchi, rales noted  Gastrointestinal:  Soft, NT/ND 	  Neurologic:  A&Ox3, normal mood/affect  Skin: no rash noted, normal color and pigmentation.      LABS:                        12.0   11.00 )-----------( 375      ( 2023 03:19 )             37.6     04-03    138  |  103  |  12  --------------------<  167<H>  3.9   |  23  |  0.62    Ca    9.1      2023 03:19  Phos  3.7     04-03  Mg     1.7     04-03    TPro  6.9  /  Alb  3.8  /  TBili  0.4  /  DBili  x   /  AST  39  /  ALT  43  /  AlkPhos  55  04-03    PT/INR - ( 2023 03:19 )   PT: 13.7 sec;   INR: 1.15     PTT - ( 2023 03:19 )  PTT:31.2 sec    EK/3 0501: high grade Hb and RBBB @ 40 bpm   0702: Wenckebach and LBBB @ 50 bpm   2207: High grade HB and LBBB @ 44 bpm    Telemetry:  Wenckebach, rates 30-50s     TTE Echo Complete w/ Contrast w/ Doppler (23 @ 09:19) >  CONCLUSIONS:   1. Moderately reduced left ventricular systolic function, ejection fraction  35-40%.   2. Global left ventricular hypokinesis.   3. Normal right ventricular size and systolic function.   4. Normal atria.   5. No significant valvular disease.   6. Pulmonary hypertension present, pulmonary artery systolic pressure is 47 mmHg.   7. Trivial pericardial effusion.   8. Compared to the previous TTE performed on 3/17/2023, the pulmonary artery systolic pressure is higher.    TTE Echo Complete w/ Contrast w/o Doppler (23 @ 10:45) >  CONCLUSIONS:   1. Moderately reduced left ventricular systolic function with global hypokinesis.   2. Normal right ventricular size and systolic function.   3. No significant valvular disease.   4. Mild pulmonary hypertension.   5. No pericardial effusion.  ejection fraction of 35-40% with global hypokinesis    < from: MR Cardiac w/wo IV Cont (23 @ 22:48) >  Impression:  1.  The left ventricle (LV) is normal in size. There is no evidence of LV hypertrophy . Left ventricular global systolic function is reduced. There is global hypokinesis with paradoxical septal motion. The LV ejection fraction is 47%.  2.  Theright ventricle (RV) is normal in size. RV global systolic function is normal. The RV ejection fraction is 60 %.  3.  No significant valvular abnormalities.  4.  No significant abnormalities of the visualized portions of the great vessels.  5.  On delayed enhancement imaging,  there is evidence of myocardial scarring in a non ischemic pattern. The pattern can be seen in myocarditis.    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 Wenckebach and High degree block with notable alternating LBBB and RBBB. Echo unchanged. Given her age, symptoms and high grade Hb with alternating LBBB and RBBB EP rec is for MICRA PPM.     - f/u with lyme studies  - f/u with ID  - NPO  - consented for MICRA; plan is for today  - post MICRA: check right groin for swelling, hematoma and bleeding; EP team to remove suture in the AM

## 2023-04-03 NOTE — CONSULT NOTE ADULT - ASSESSMENT
IMPRESSION: Unclear if the patient has Lyme carditis.  Lyme testing (Western blot and IgM/IgG) were negative.  However these could be false negative in setting of acute infection.  Can check these again to look for seroconversion. If they are negative now and she did not respond to Doxycycline, I would question the diagnosis. If the serologies are now positive it would raise suspicion for Lyme Carditis and I would question her compliance with doxycycline    Recommend:  1.  Continue Ceftriaxone 2 grams IV daily   2.  Check Lyme VISE with reflex IgG/IgM  3.  Send repeat lyme western blot     ID team 1 will follow

## 2023-04-03 NOTE — PROGRESS NOTE ADULT - ASSESSMENT
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 w/ outpatient EKG showing complete heart block with alternating RBBB and LBBB, admitted for PPM placement.     NEURO  TRA    PULM  #PEREIRA/SOB  Symptoms resolved upon arrival to CCU. CXR w/o evidence of pulmonary edema, no crackles or LE edema. Satting well on RA.    CARDIOVASCULAR  #3rd Degree AV Block  #Chronic HFrEF  #Acute myocarditis    Symptomatic complete  AV block w/ alternating RBBB and LBBB likely 2/2 myocarditis presumed to be Lyme disease due to Lyme titers 3/17: (+) Lyme C6 ab, however, Western Blot negative on 3/18.   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 Pt refused cardiac biopsy to differentiate between lyme carditis and lymphocytic myocarditis on last admission.  Discharged on Doxycycline 100 mg BID x3 weeks (through 4/7/23) and Levothyroxine 25 mcg daily.   Per patient, was not taking losartan, farxiga, or synthroid at home.     - c/w CTX  - refusing losartan at this time  - EP consult in AM, plan for PPM which patient is now amenable for  - NPO for procedure  - ID consult in AM    ENDO  #DM  A1c 9.8, not on any home meds, refusing inpatient sliding scale.    #Hypothyroidism  TSH 7.15 on last admission, pt has not been compliant with home synthroid. 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 w/ outpatient EKG showing complete heart block with alternating RBBB and LBBB, admitted for PPM placement.     NEURO  TRA    PULM  TRA    CARDIOVASCULAR  #3rd Degree AV Block  #Chronic HFrEF  #Acute myocarditis    Symptomatic complete  AV block w/ alternating RBBB and LBBB and CHB likely 2/2 myocarditis presumed to be Lyme disease due to Lyme titers 3/17: (+) Lyme C6 ab, however, Western Blot negative on 3/18.   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 Pt refused cardiac biopsy to differentiate between lyme carditis and lymphocytic myocarditis on last admission.  Discharged on Doxycycline 100 mg BID x3 weeks (through 4/7/23) and Levothyroxine 25 mcg daily.   Per patient, was not taking losartan, farxiga, or synthroid at home.     - c/w CTX  - refusing losartan at this time  - PPM placement today under EP  - ID consult for comanagement of lyme disease    ENDO  #DM  A1c 9.8, not on any home meds, refusing inpatient sliding scale.    #Hypothyroidism  TSH 7.15 on last admission, pt has not been compliant with home synthroid. Refusing synthroid inpatient  -repeat TSH 12  - cont home synthroid 25mcg QD - pt refusing    GI  TRA    RENAL  TRA    HEME  TRA    ID  #Lyme Disease  Lyme C6 ab (+), Western Blot negative on 3/18  - c/w CTX 2g QD  - ID consulted - f/u recs  - send repeat lyme Abs w/ reflex western blot    MISC  F: None   E: Replete PRN   N: DASH/TLC   DVT ppx: holding for PPM on 4/3 AM  Dispo: CCU

## 2023-04-03 NOTE — CONSULT NOTE ADULT - SUBJECTIVE AND OBJECTIVE BOX
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 EP recommending PPM on last admission as well as biopsy to rule out lymphocytic myocarditis, however, patient declined both interventions at time of discharge.     Patient p/w 24 hours of generalized weakness, lightheadedness, shortness of breath, and dyspnea on exertion. Outpatient EKG performed on  and sent to her cardiologist Dr. Su who recommended evaluation in the ED for complete heart block with alternating RBBB and LBBB. Patient denies any chest pain, n/v/d, rashes, dizziness, focal weakness, cough, or dysuria.     Patient reports compliance with doxycycline     PAST MEDICAL & SURGICAL HISTORY:  Lyme disease      Diabetes      History of myocarditis      Third degree AV block      S/P       S/P hernia repair            REVIEW OF SYSTEMS:    General:	 no weakness; no fevers, no chills  Skin/Breast: no rash  Respiratory and Thorax: no SOB, no cough  Cardiovascular:	No chest pain  Gastrointestinal:	 no nausea, vomiting , diarrhea  Genitourinary:	no dysuria, no difficulty urinating, no hematuria  Musculoskeletal:	no weakness, no joint swelling/pain  Neurological:	no focal weakness/numbness  Endocrine:	no polyuria, no polydipsia      ANTIBIOTICS:  MEDICATIONS  (STANDING):  cefTRIAXone   IVPB 2000 milliGRAM(s) IV Intermittent every 24 hours  chlorhexidine 4% Liquid 1 Application(s) Topical <User Schedule>  dextrose 5%. 1000 milliLiter(s) (50 mL/Hr) IV Continuous <Continuous>  dextrose 5%. 1000 milliLiter(s) (100 mL/Hr) IV Continuous <Continuous>  dextrose 50% Injectable 25 Gram(s) IV Push once  dextrose 50% Injectable 12.5 Gram(s) IV Push once  dextrose 50% Injectable 25 Gram(s) IV Push once  glucagon  Injectable 1 milliGRAM(s) IntraMuscular once  insulin lispro (ADMELOG) corrective regimen sliding scale   SubCutaneous Before meals and at bedtime  levothyroxine 25 MICROGram(s) Oral daily  losartan 25 milliGRAM(s) Oral every 24 hours    MEDICATIONS  (PRN):  dextrose Oral Gel 15 Gram(s) Oral once PRN Blood Glucose LESS THAN 70 milliGRAM(s)/deciliter      Allergies    No Known Allergies    Intolerances        SOCIAL HISTORY:    FAMILY HISTORY:      Vital Signs Last 24 Hrs  T(C): 36.6 (2023 09:00), Max: 37.1 (2023 01:12)  T(F): 97.9 (2023 09:00), Max: 98.7 (2023 01:12)  HR: 44 (2023 12:00) (39 - 54)  BP: 142/71 (2023 11:00) (110/56 - 166/78)  BP(mean): 98 (2023 11:00) (80 - 115)  RR: 44 (2023 12:00) (16 - 44)  SpO2: 98% (2023 12:00) (93% - 99%)    Parameters below as of 2023 09:00  Patient On (Oxygen Delivery Method): room air        PHYSICAL EXAM:  Constitutional:Well-developed, well nourished  Eyes:YADI, EOMI  Ear/Nose/Throat: no oral lesion, no sinus tenderness on percussion	  Neck:no JVD, no lymphadenopathy, supple  Respiratory: CTA willis  Cardiovascular: S1S2 RRR, no murmurs  Gastrointestinal:soft, (+) BS, no HSM  Extremities:no e/e/c  Vascular: DP Pulse:	right normal; left normal            LABS:                        12.0   11.00 )-----------( 375      ( 2023 03:19 )             37.6     04-03    138  |  103  |  12  ----------------------------<  167<H>  3.9   |  23  |  0.62    Ca    9.1      2023 03:19  Phos  3.7     04-03  Mg     1.7     04-03    TPro  6.9  /  Alb  3.8  /  TBili  0.4  /  DBili  x   /  AST  39  /  ALT  43  /  AlkPhos  55  04-03    PT/INR - ( 2023 03:19 )   PT: 13.7 sec;   INR: 1.15          PTT - ( 2023 03:19 )  PTT:31.2 sec  Urinalysis Basic - ( 2023 23:00 )    Color: Yellow / Appearance: Clear / S.025 / pH: x  Gluc: x / Ketone: NEGATIVE  / Bili: Negative / Urobili: 0.2 E.U./dL   Blood: x / Protein: NEGATIVE mg/dL / Nitrite: NEGATIVE   Leuk Esterase: NEGATIVE / RBC: x / WBC x   Sq Epi: x / Non Sq Epi: x / Bacteria: x        MICROBIOLOGY:    Culture - Group A Streptococcus (23 @ 11:44)    Specimen Source: Cult/Viral Throat   Culture Results:   No beta hemolytic streptococci or Arcanobacterium haemolyticum isolated.      RADIOLOGY & ADDITIONAL STUDIES:

## 2023-04-03 NOTE — PROGRESS NOTE ADULT - SUBJECTIVE AND OBJECTIVE BOX
INTERVAL HPI/OVERNIGHT EVENTS:  Patient was seen and examined at bedside. As per night team, , patient resting comfortably. C/o. Patient denies: fever, chills, dizziness, weakness, HA, changes in vision, CP, palpitations, SOB, cough, N/V, dysuria, changes in bowel movements, LE edema.     VITAL SIGNS:  T(F): 97.9 (23 @ 09:00)  HR: 43 (23 @ 11:00)  BP: 142/71 (23 @ 11:00)  RR: 21 (23 @ 11:00)  SpO2: 97% (23 @ 11:00)  Wt(kg): --      23 @ 07:01  -  23 @ 07:00  --------------------------------------------------------  IN: 220 mL / OUT: 781 mL / NET: -561 mL        PHYSICAL EXAM:  Constitutional: NAD  HEENT: PERRL, EOMI, sclera non-icteric, neck supple, trachea midline, no masses, no JVD, MMM  Respiratory: CTA b/l, good air entry b/l, no wheezing, no rhonchi, no rales, without accessory muscle use and no intercostal retractions  Cardiovascular: RRR, normal S1S2, no M/R/G  Gastrointestinal: soft, NTND, no masses palpable, BS normal  Extremities: Warm, well perfused, pulses equal bilateral upper and lower extremities, no edema, no clubbing  Neurological: AAOx3, CN Grossly intact  Skin: Normal temperature, warm, dry    MEDICATIONS  (STANDING):  cefTRIAXone   IVPB 2000 milliGRAM(s) IV Intermittent every 24 hours  chlorhexidine 4% Liquid 1 Application(s) Topical <User Schedule>  dextrose 5%. 1000 milliLiter(s) (50 mL/Hr) IV Continuous <Continuous>  dextrose 5%. 1000 milliLiter(s) (100 mL/Hr) IV Continuous <Continuous>  dextrose 50% Injectable 25 Gram(s) IV Push once  dextrose 50% Injectable 12.5 Gram(s) IV Push once  dextrose 50% Injectable 25 Gram(s) IV Push once  glucagon  Injectable 1 milliGRAM(s) IntraMuscular once  insulin lispro (ADMELOG) corrective regimen sliding scale   SubCutaneous Before meals and at bedtime  levothyroxine 25 MICROGram(s) Oral daily  losartan 25 milliGRAM(s) Oral every 24 hours    MEDICATIONS  (PRN):  dextrose Oral Gel 15 Gram(s) Oral once PRN Blood Glucose LESS THAN 70 milliGRAM(s)/deciliter      Allergies    No Known Allergies    Intolerances        LABS:                        12.0   11.00 )-----------( 375      ( 2023 03:19 )             37.6     04-03    138  |  103  |  12  ----------------------------<  167<H>  3.9   |  23  |  0.62    Ca    9.1      2023 03:19  Phos  3.7     04-03  Mg     1.7     04-03    TPro  6.9  /  Alb  3.8  /  TBili  0.4  /  DBili  x   /  AST  39  /  ALT  43  /  AlkPhos  55  04-03    PT/INR - ( 2023 03:19 )   PT: 13.7 sec;   INR: 1.15          PTT - ( 2023 03:19 )  PTT:31.2 sec  Urinalysis Basic - ( 2023 23:00 )    Color: Yellow / Appearance: Clear / S.025 / pH: x  Gluc: x / Ketone: NEGATIVE  / Bili: Negative / Urobili: 0.2 E.U./dL   Blood: x / Protein: NEGATIVE mg/dL / Nitrite: NEGATIVE   Leuk Esterase: NEGATIVE / RBC: x / WBC x   Sq Epi: x / Non Sq Epi: x / Bacteria: x          RADIOLOGY & ADDITIONAL TESTS:  Reviewed INTERVAL HPI/OVERNIGHT EVENTS:  Patient was seen and examined at bedside. As per night team, started ceftriaxone 2g QD and losartan 25mg QD, patient resting comfortably. No active complaints. Patient denies: fever, chills, dizziness, weakness, HA, changes in vision, CP, palpitations, SOB, cough, N/V, dysuria, changes in bowel movements, LE edema.     VITAL SIGNS:  T(F): 97.9 (23 @ 09:00)  HR: 43 (23 @ 11:00)  BP: 142/71 (23 @ 11:00)  RR: 21 (23 @ 11:00)  SpO2: 97% (23 @ 11:00)  Wt(kg): --    23 @ 07:01  -  23 @ 07:00  --------------------------------------------------------  IN: 220 mL / OUT: 781 mL / NET: -561 mL    PHYSICAL EXAM:  Constitutional: NAD  HEENT: PERRL, EOMI, sclera non-icteric, neck supple, trachea midline, no masses, no JVD, MMM  Respiratory: CTA b/l, good air entry b/l, no wheezing, no rhonchi, no rales, without accessory muscle use and no intercostal retractions  Cardiovascular: IIR, S1 w/ varying intensity, no M/R/G  Gastrointestinal: soft, NTND, no masses palpable, BS normal  Extremities: Warm, well perfused, pulses equal bilateral upper and lower extremities, no edema, no clubbing  Neurological: AAOx3, CN Grossly intact  Skin: Normal temperature, warm, dry    MEDICATIONS  (STANDING):  cefTRIAXone   IVPB 2000 milliGRAM(s) IV Intermittent every 24 hours  chlorhexidine 4% Liquid 1 Application(s) Topical <User Schedule>  dextrose 5%. 1000 milliLiter(s) (50 mL/Hr) IV Continuous <Continuous>  dextrose 5%. 1000 milliLiter(s) (100 mL/Hr) IV Continuous <Continuous>  dextrose 50% Injectable 25 Gram(s) IV Push once  dextrose 50% Injectable 12.5 Gram(s) IV Push once  dextrose 50% Injectable 25 Gram(s) IV Push once  glucagon  Injectable 1 milliGRAM(s) IntraMuscular once  insulin lispro (ADMELOG) corrective regimen sliding scale   SubCutaneous Before meals and at bedtime  levothyroxine 25 MICROGram(s) Oral daily  losartan 25 milliGRAM(s) Oral every 24 hours    MEDICATIONS  (PRN):  dextrose Oral Gel 15 Gram(s) Oral once PRN Blood Glucose LESS THAN 70 milliGRAM(s)/deciliter      Allergies    No Known Allergies    Intolerances        LABS:                        12.0   11.00 )-----------( 375      ( 2023 03:19 )             37.6     04-03    138  |  103  |  12  ----------------------------<  167<H>  3.9   |  23  |  0.62    Ca    9.1      2023 03:19  Phos  3.7     04-03  Mg     1.7     04-03    TPro  6.9  /  Alb  3.8  /  TBili  0.4  /  DBili  x   /  AST  39  /  ALT  43  /  AlkPhos  55  04-03    PT/INR - ( 2023 03:19 )   PT: 13.7 sec;   INR: 1.15          PTT - ( 2023 03:19 )  PTT:31.2 sec  Urinalysis Basic - ( 2023 23:00 )    Color: Yellow / Appearance: Clear / S.025 / pH: x  Gluc: x / Ketone: NEGATIVE  / Bili: Negative / Urobili: 0.2 E.U./dL   Blood: x / Protein: NEGATIVE mg/dL / Nitrite: NEGATIVE   Leuk Esterase: NEGATIVE / RBC: x / WBC x   Sq Epi: x / Non Sq Epi: x / Bacteria: x          RADIOLOGY & ADDITIONAL TESTS:  Reviewed

## 2023-04-04 ENCOUNTER — TRANSCRIPTION ENCOUNTER (OUTPATIENT)
Age: 48
End: 2023-04-04

## 2023-04-04 VITALS
RESPIRATION RATE: 18 BRPM | DIASTOLIC BLOOD PRESSURE: 69 MMHG | HEART RATE: 64 BPM | OXYGEN SATURATION: 92 % | SYSTOLIC BLOOD PRESSURE: 131 MMHG

## 2023-04-04 PROBLEM — Z86.79 PERSONAL HISTORY OF OTHER DISEASES OF THE CIRCULATORY SYSTEM: Chronic | Status: ACTIVE | Noted: 2023-04-03

## 2023-04-04 PROBLEM — I44.2 ATRIOVENTRICULAR BLOCK, COMPLETE: Chronic | Status: ACTIVE | Noted: 2023-04-03

## 2023-04-04 PROBLEM — E11.9 TYPE 2 DIABETES MELLITUS WITHOUT COMPLICATIONS: Chronic | Status: ACTIVE | Noted: 2023-04-03

## 2023-04-04 PROBLEM — A69.20 LYME DISEASE, UNSPECIFIED: Chronic | Status: ACTIVE | Noted: 2023-04-02

## 2023-04-04 LAB
ALBUMIN SERPL ELPH-MCNC: 3.3 G/DL — SIGNIFICANT CHANGE UP (ref 3.3–5)
ALBUMIN SERPL ELPH-MCNC: 3.8 G/DL — SIGNIFICANT CHANGE UP (ref 3.3–5)
ALP SERPL-CCNC: 53 U/L — SIGNIFICANT CHANGE UP (ref 40–120)
ALP SERPL-CCNC: 60 U/L — SIGNIFICANT CHANGE UP (ref 40–120)
ALT FLD-CCNC: 38 U/L — SIGNIFICANT CHANGE UP (ref 10–45)
ALT FLD-CCNC: 40 U/L — SIGNIFICANT CHANGE UP (ref 10–45)
ANION GAP SERPL CALC-SCNC: 12 MMOL/L — SIGNIFICANT CHANGE UP (ref 5–17)
ANION GAP SERPL CALC-SCNC: 13 MMOL/L — SIGNIFICANT CHANGE UP (ref 5–17)
AST SERPL-CCNC: 34 U/L — SIGNIFICANT CHANGE UP (ref 10–40)
AST SERPL-CCNC: 45 U/L — HIGH (ref 10–40)
B BURGDOR C6 AB SER-ACNC: POSITIVE
B BURGDOR IGG+IGM SER QL IB: SIGNIFICANT CHANGE UP
B BURGDOR IGG+IGM SER-ACNC: 7.23 INDEX — HIGH (ref 0.01–0.89)
BASOPHILS # BLD AUTO: 0.05 K/UL — SIGNIFICANT CHANGE UP (ref 0–0.2)
BASOPHILS NFR BLD AUTO: 0.5 % — SIGNIFICANT CHANGE UP (ref 0–2)
BILIRUB SERPL-MCNC: 0.6 MG/DL — SIGNIFICANT CHANGE UP (ref 0.2–1.2)
BILIRUB SERPL-MCNC: 0.7 MG/DL — SIGNIFICANT CHANGE UP (ref 0.2–1.2)
BUN SERPL-MCNC: 9 MG/DL — SIGNIFICANT CHANGE UP (ref 7–23)
BUN SERPL-MCNC: 9 MG/DL — SIGNIFICANT CHANGE UP (ref 7–23)
CALCIUM SERPL-MCNC: 8.2 MG/DL — LOW (ref 8.4–10.5)
CALCIUM SERPL-MCNC: 8.5 MG/DL — SIGNIFICANT CHANGE UP (ref 8.4–10.5)
CHLORIDE SERPL-SCNC: 101 MMOL/L — SIGNIFICANT CHANGE UP (ref 96–108)
CHLORIDE SERPL-SCNC: 105 MMOL/L — SIGNIFICANT CHANGE UP (ref 96–108)
CO2 SERPL-SCNC: 18 MMOL/L — LOW (ref 22–31)
CO2 SERPL-SCNC: 23 MMOL/L — SIGNIFICANT CHANGE UP (ref 22–31)
CREAT SERPL-MCNC: 0.46 MG/DL — LOW (ref 0.5–1.3)
CREAT SERPL-MCNC: 0.54 MG/DL — SIGNIFICANT CHANGE UP (ref 0.5–1.3)
EGFR: 114 ML/MIN/1.73M2 — SIGNIFICANT CHANGE UP
EGFR: 119 ML/MIN/1.73M2 — SIGNIFICANT CHANGE UP
EOSINOPHIL # BLD AUTO: 0.21 K/UL — SIGNIFICANT CHANGE UP (ref 0–0.5)
EOSINOPHIL NFR BLD AUTO: 2.2 % — SIGNIFICANT CHANGE UP (ref 0–6)
GLUCOSE BLDC GLUCOMTR-MCNC: 129 MG/DL — HIGH (ref 70–99)
GLUCOSE BLDC GLUCOMTR-MCNC: 139 MG/DL — HIGH (ref 70–99)
GLUCOSE BLDC GLUCOMTR-MCNC: 150 MG/DL — HIGH (ref 70–99)
GLUCOSE SERPL-MCNC: 168 MG/DL — HIGH (ref 70–99)
GLUCOSE SERPL-MCNC: 171 MG/DL — HIGH (ref 70–99)
HCT VFR BLD CALC: 37.5 % — SIGNIFICANT CHANGE UP (ref 34.5–45)
HGB BLD-MCNC: 12.3 G/DL — SIGNIFICANT CHANGE UP (ref 11.5–15.5)
IMM GRANULOCYTES NFR BLD AUTO: 0.2 % — SIGNIFICANT CHANGE UP (ref 0–0.9)
LACTATE SERPL-SCNC: 1.1 MMOL/L — SIGNIFICANT CHANGE UP (ref 0.5–2)
LYME IGG AB: 0.05 INDEX — SIGNIFICANT CHANGE UP (ref 0.01–0.9)
LYME IGG INTERP: NEGATIVE — SIGNIFICANT CHANGE UP
LYME IGM AB: <0.01 INDEX — SIGNIFICANT CHANGE UP (ref 0.01–0.9)
LYME IGM INTERP: NEGATIVE — SIGNIFICANT CHANGE UP
LYMPHOCYTES # BLD AUTO: 1.69 K/UL — SIGNIFICANT CHANGE UP (ref 1–3.3)
LYMPHOCYTES # BLD AUTO: 17.8 % — SIGNIFICANT CHANGE UP (ref 13–44)
MAGNESIUM SERPL-MCNC: 1.9 MG/DL — SIGNIFICANT CHANGE UP (ref 1.6–2.6)
MAGNESIUM SERPL-MCNC: 2 MG/DL — SIGNIFICANT CHANGE UP (ref 1.6–2.6)
MCHC RBC-ENTMCNC: 30.4 PG — SIGNIFICANT CHANGE UP (ref 27–34)
MCHC RBC-ENTMCNC: 32.8 GM/DL — SIGNIFICANT CHANGE UP (ref 32–36)
MCV RBC AUTO: 92.8 FL — SIGNIFICANT CHANGE UP (ref 80–100)
MONOCYTES # BLD AUTO: 0.49 K/UL — SIGNIFICANT CHANGE UP (ref 0–0.9)
MONOCYTES NFR BLD AUTO: 5.2 % — SIGNIFICANT CHANGE UP (ref 2–14)
NEUTROPHILS # BLD AUTO: 7.04 K/UL — SIGNIFICANT CHANGE UP (ref 1.8–7.4)
NEUTROPHILS NFR BLD AUTO: 74.1 % — SIGNIFICANT CHANGE UP (ref 43–77)
NRBC # BLD: 0 /100 WBCS — SIGNIFICANT CHANGE UP (ref 0–0)
PHOSPHATE SERPL-MCNC: 2.3 MG/DL — LOW (ref 2.5–4.5)
PHOSPHATE SERPL-MCNC: 2.8 MG/DL — SIGNIFICANT CHANGE UP (ref 2.5–4.5)
PLATELET # BLD AUTO: 354 K/UL — SIGNIFICANT CHANGE UP (ref 150–400)
POTASSIUM SERPL-MCNC: 4.1 MMOL/L — SIGNIFICANT CHANGE UP (ref 3.5–5.3)
POTASSIUM SERPL-MCNC: 4.6 MMOL/L — SIGNIFICANT CHANGE UP (ref 3.5–5.3)
POTASSIUM SERPL-SCNC: 4.1 MMOL/L — SIGNIFICANT CHANGE UP (ref 3.5–5.3)
POTASSIUM SERPL-SCNC: 4.6 MMOL/L — SIGNIFICANT CHANGE UP (ref 3.5–5.3)
PROT SERPL-MCNC: 6.8 G/DL — SIGNIFICANT CHANGE UP (ref 6–8.3)
PROT SERPL-MCNC: 7.1 G/DL — SIGNIFICANT CHANGE UP (ref 6–8.3)
RBC # BLD: 4.04 M/UL — SIGNIFICANT CHANGE UP (ref 3.8–5.2)
RBC # FLD: 15.1 % — HIGH (ref 10.3–14.5)
SODIUM SERPL-SCNC: 136 MMOL/L — SIGNIFICANT CHANGE UP (ref 135–145)
SODIUM SERPL-SCNC: 136 MMOL/L — SIGNIFICANT CHANGE UP (ref 135–145)
WBC # BLD: 9.5 K/UL — SIGNIFICANT CHANGE UP (ref 3.8–10.5)
WBC # FLD AUTO: 9.5 K/UL — SIGNIFICANT CHANGE UP (ref 3.8–10.5)

## 2023-04-04 PROCEDURE — C1730: CPT

## 2023-04-04 PROCEDURE — 84439 ASSAY OF FREE THYROXINE: CPT

## 2023-04-04 PROCEDURE — 84443 ASSAY THYROID STIM HORMONE: CPT

## 2023-04-04 PROCEDURE — 87635 SARS-COV-2 COVID-19 AMP PRB: CPT

## 2023-04-04 PROCEDURE — 85610 PROTHROMBIN TIME: CPT

## 2023-04-04 PROCEDURE — 83880 ASSAY OF NATRIURETIC PEPTIDE: CPT

## 2023-04-04 PROCEDURE — C1786: CPT

## 2023-04-04 PROCEDURE — 84100 ASSAY OF PHOSPHORUS: CPT

## 2023-04-04 PROCEDURE — 71045 X-RAY EXAM CHEST 1 VIEW: CPT | Mod: 26

## 2023-04-04 PROCEDURE — 84484 ASSAY OF TROPONIN QUANT: CPT

## 2023-04-04 PROCEDURE — C8929: CPT

## 2023-04-04 PROCEDURE — 82962 GLUCOSE BLOOD TEST: CPT

## 2023-04-04 PROCEDURE — 84481 FREE ASSAY (FT-3): CPT

## 2023-04-04 PROCEDURE — 85025 COMPLETE CBC W/AUTO DIFF WBC: CPT

## 2023-04-04 PROCEDURE — 80053 COMPREHEN METABOLIC PANEL: CPT

## 2023-04-04 PROCEDURE — 83605 ASSAY OF LACTIC ACID: CPT

## 2023-04-04 PROCEDURE — 99285 EMERGENCY DEPT VISIT HI MDM: CPT

## 2023-04-04 PROCEDURE — 86617 LYME DISEASE ANTIBODY: CPT

## 2023-04-04 PROCEDURE — 80048 BASIC METABOLIC PNL TOTAL CA: CPT

## 2023-04-04 PROCEDURE — 81003 URINALYSIS AUTO W/O SCOPE: CPT

## 2023-04-04 PROCEDURE — 80076 HEPATIC FUNCTION PANEL: CPT

## 2023-04-04 PROCEDURE — 99239 HOSP IP/OBS DSCHRG MGMT >30: CPT

## 2023-04-04 PROCEDURE — 83735 ASSAY OF MAGNESIUM: CPT

## 2023-04-04 PROCEDURE — 86850 RBC ANTIBODY SCREEN: CPT

## 2023-04-04 PROCEDURE — 71045 X-RAY EXAM CHEST 1 VIEW: CPT

## 2023-04-04 PROCEDURE — C1894: CPT

## 2023-04-04 PROCEDURE — C1769: CPT

## 2023-04-04 PROCEDURE — 86901 BLOOD TYPING SEROLOGIC RH(D): CPT

## 2023-04-04 PROCEDURE — 36415 COLL VENOUS BLD VENIPUNCTURE: CPT

## 2023-04-04 PROCEDURE — 85730 THROMBOPLASTIN TIME PARTIAL: CPT

## 2023-04-04 PROCEDURE — 86900 BLOOD TYPING SEROLOGIC ABO: CPT

## 2023-04-04 PROCEDURE — 99232 SBSQ HOSP IP/OBS MODERATE 35: CPT

## 2023-04-04 PROCEDURE — 86618 LYME DISEASE ANTIBODY: CPT

## 2023-04-04 RX ORDER — LOSARTAN POTASSIUM 100 MG/1
1 TABLET, FILM COATED ORAL
Qty: 0 | Refills: 0 | DISCHARGE
Start: 2023-04-04

## 2023-04-04 RX ADMIN — Medication 85 MILLIMOLE(S): at 14:09

## 2023-04-04 RX ADMIN — Medication 25 MICROGRAM(S): at 05:40

## 2023-04-04 NOTE — PROGRESS NOTE ADULT - SUBJECTIVE AND OBJECTIVE BOX
infectious diseases progress note:  ALISHA MIDDLETON is a 47yFemale patient    THIRD DEGREE HEART BLOCK    HPI: pt refuses to take anti-HTN but otherwise has no other complaints such as HA, fevers/chills, n/v/d, abd pain, CP, or LE swelling. Sutures were removed last night and has minor pain near incision site.           ROS:  CONSTITUTIONAL:  Negative fever or chills, feels well, good appetite  EYES:  Negative  blurry vision or double vision  CARDIOVASCULAR:  Negative for chest pain or palpitations  RESPIRATORY:  Negative for cough, wheezing, or SOB   GASTROINTESTINAL:  Negative for nausea, vomiting, diarrhea, constipation, or abdominal pain  GENITOURINARY:  Negative frequency, urgency or dysuria  NEUROLOGIC:  No headache, confusion, dizziness, lightheadedness    Allergies    No Known Allergies    Intolerances        ANTIBIOTICS/RELEVANT:  antimicrobials  cefTRIAXone   IVPB 2000 milliGRAM(s) IV Intermittent every 24 hours    immunologic:    OTHER:  chlorhexidine 4% Liquid 1 Application(s) Topical <User Schedule>  dextrose 5%. 1000 milliLiter(s) IV Continuous <Continuous>  dextrose 5%. 1000 milliLiter(s) IV Continuous <Continuous>  dextrose 50% Injectable 25 Gram(s) IV Push once  dextrose 50% Injectable 12.5 Gram(s) IV Push once  dextrose 50% Injectable 25 Gram(s) IV Push once  dextrose Oral Gel 15 Gram(s) Oral once PRN  glucagon  Injectable 1 milliGRAM(s) IntraMuscular once  insulin lispro (ADMELOG) corrective regimen sliding scale   SubCutaneous Before meals and at bedtime  levothyroxine 25 MICROGram(s) Oral daily  losartan 25 milliGRAM(s) Oral every 24 hours      Objective:  Vital Signs Last 24 Hrs  T(C): 36.9 (2023 10:00), Max: 37 (2023 05:01)  T(F): 98.5 (2023 10:00), Max: 98.6 (2023 05:01)  HR: 68 (2023 11:00) (41 - 88)  BP: 148/72 (2023 11:00) (105/56 - 168/72)  BP(mean): 100 (2023 11:00) (73 - 125)  RR: 28 (2023 11:00) (11 - 44)  SpO2: 95% (2023 11:00) (93% - 98%)    Parameters below as of 2023 11:00  Patient On (Oxygen Delivery Method): room air        PHYSICAL EXAM:  Constitutional: Well-developed, well nourished  Eyes :YADI, EOMI  Ear/Nose/Throat: no oral lesion, no sinus tenderness on percussion	  Neck :no JVD, no lymphadenopathy, supple  Respiratory: CTA b/l  Cardiovascular: S1S2 RRR, no murmurs  Gastrointestinal:soft, (+) BS, no HSM  Extremities: WWP, 1+ b/l peripheral edema         LABS:                        12.3   9.50  )-----------( 354      ( 2023 06:27 )             37.5     04-04    136  |  101  |  9   ----------------------------<  171<H>  4.1   |  23  |  0.54    Ca    8.5      2023 06:27  Phos  2.3     04-04  Mg     1.9     04-04    TPro  7.1  /  Alb  3.8  /  TBili  0.7  /  DBili  x   /  AST  34  /  ALT  38  /  AlkPhos  60  04-04    PT/INR - ( 2023 03:19 )   PT: 13.7 sec;   INR: 1.15          PTT - ( 2023 03:19 )  PTT:31.2 sec  Urinalysis Basic - ( 2023 23:00 )    Color: Yellow / Appearance: Clear / S.025 / pH: x  Gluc: x / Ketone: NEGATIVE  / Bili: Negative / Urobili: 0.2 E.U./dL   Blood: x / Protein: NEGATIVE mg/dL / Nitrite: NEGATIVE   Leuk Esterase: NEGATIVE / RBC: x / WBC x   Sq Epi: x / Non Sq Epi: x / Bacteria: x          MICROBIOLOGY:        RADIOLOGY & ADDITIONAL STUDIES:

## 2023-04-04 NOTE — DISCHARGE NOTE PROVIDER - NSDCMRMEDTOKEN_GEN_ALL_CORE_FT
losartan 25 mg oral tablet: 1 tab(s) orally every 24 hours  Synthroid 25 mcg (0.025 mg) oral tablet: 1 tab(s) orally once a day

## 2023-04-04 NOTE — DISCHARGE NOTE PROVIDER - NSDCCPCAREPLAN_GEN_ALL_CORE_FT
PRINCIPAL DISCHARGE DIAGNOSIS  Diagnosis: Third degree heart block  Assessment and Plan of Treatment: Third-degree atrioventricular block, also called complete block, is the most serious type of heart block. In this condition, the signals that control heart rate are completely blocked. Third-degree heart block is a medical emergency that, for you, was treated right away at a hospital.  Your treatment included the placement of a permanent pacemaker, which uses electrical pulses to help the heart beat normally.      SECONDARY DISCHARGE DIAGNOSES  Diagnosis: Lyme carditis  Assessment and Plan of Treatment: Your complete heart block was most likely due to lyme carditis. Please finish 2 weeks of IV ceftraixone to treat this condition and follow up with your outpatient provider.

## 2023-04-04 NOTE — PROGRESS NOTE ADULT - ASSESSMENT
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 w/ outpatient EKG showing complete heart block with alternating RBBB and LBBB, admitted for PPM placement.     NEURO  TRA    PULM  TRA    CARDIOVASCULAR  #3rd Degree AV Block  #Chronic HFrEF  #Acute myocarditis    Symptomatic complete  AV block w/ alternating RBBB and LBBB and CHB likely 2/2 myocarditis presumed to be Lyme disease due to Lyme titers 3/17: (+) Lyme C6 ab, however, Western Blot negative on 3/18.   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 Pt refused cardiac biopsy to differentiate between lyme carditis and lymphocytic myocarditis on last admission.  Discharged on Doxycycline 100 mg BID x3 weeks (through 4/7/23) and Levothyroxine 25 mcg daily.   Per patient, was not taking losartan, farxiga, or synthroid at home.     - c/w CTX  - refusing losartan at this time  - PPM placement today under EP  - ID consult for comanagement of lyme disease    ENDO  #DM  A1c 9.8, not on any home meds, refusing inpatient sliding scale.    #Hypothyroidism  TSH 7.15 on last admission, pt has not been compliant with home synthroid. 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 w/ outpatient EKG showing complete heart block with alternating RBBB and LBBB, admitted for PPM placement.     NEURO  TRA    PULM  TRA    CARDIOVASCULAR  #3rd Degree AV Block s/p MI[employer] PPM  #Chronic HFrEF  #Acute myocarditis    Symptomatic complete  AV block w/ alternating RBBB and LBBB and CHB likely 2/2 myocarditis presumed to be Lyme disease due to Lyme titers 3/17: (+) Lyme C6 ab, however, Western Blot negative on 3/18.   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 Pt refused cardiac biopsy to differentiate between lyme carditis and lymphocytic myocarditis on last admission.  Discharged on Doxycycline 100 mg BID x3 weeks (through 4/7/23) and Levothyroxine 25 mcg daily.   Per patient, was not taking losartan, farxiga, or synthroid at home.     - c/w CTX   - refusing losartan at this time  - MI[employer] PPM placed under EP 4/4/23  - ID consulted for comanagement of lyme disease - recommending 2w CTX     ENDO  #DM  A1c 9.8, not on any home meds, refusing inpatient sliding scale.    #Hypothyroidism  TSH 7.15 on last admission, pt has not been compliant with home synthroid. 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 w/ outpatient EKG showing complete heart block with alternating RBBB and LBBB, admitted for PPM placement.     NEURO  TRA    PULM  TRA    CARDIOVASCULAR  #3rd Degree AV Block s/p MI[employer] PPM  #Chronic HFrEF  #Acute myocarditis    Symptomatic complete  AV block w/ alternating RBBB and LBBB and CHB likely 2/2 myocarditis presumed to be Lyme disease due to Lyme titers 3/17: (+) Lyme C6 ab, however, Western Blot negative on 3/18.   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 Pt refused cardiac biopsy to differentiate between lyme carditis and lymphocytic myocarditis on last admission.  Discharged on Doxycycline 100 mg BID x3 weeks (through 4/7/23) and Levothyroxine 25 mcg daily.   Per patient, was not taking losartan, farxiga, or synthroid at home.     - c/w CTX   - refusing losartan at this time  - [facility] PPM placed under EP 4/4/23  - ID consulted for comanagement of lyme disease - recommending 2w CTX via midline    ENDO  #DM  A1c 9.8, not on any home meds, refusing inpatient sliding scale.    #Hypothyroidism  TSH 7.15 on last admission, pt has not been compliant with home synthroid. Refusing synthroid inpatient  -repeat TSH 12  - cont home synthroid 25mcg QD    GI  TRA    RENAL  TRA    HEME  TRA    ID  #Lyme Disease  Lyme C6 ab (+), Western Blot negative on 3/18  - c/w CTX 2g QD - to be discharged on CTX via midline  - ID consulted - f/u recs  - sent repeat lyme Abs w/ reflex western blot    MISC  F: None   E: Replete PRN   N: DASH/TLC   DVT ppx: none  Dispo: CCU

## 2023-04-04 NOTE — PROGRESS NOTE ADULT - SUBJECTIVE AND OBJECTIVE BOX
INTERVAL HPI/OVERNIGHT EVENTS:  Patient was seen and examined at bedside. As per night team, , patient resting comfortably. C/o. Patient denies: fever, chills, dizziness, weakness, HA, changes in vision, CP, palpitations, SOB, cough, N/V, dysuria, changes in bowel movements, LE edema.     VITAL SIGNS:  T(F): 98.5 (23 @ 10:00)  HR: 68 (23 @ 11:00)  BP: 148/72 (23 @ 11:00)  RR: 28 (23 @ 11:00)  SpO2: 95% (23 @ 11:00)  Wt(kg): --      23 @ 07:01  -  23 @ 07:00  --------------------------------------------------------  IN: 0 mL / OUT: 1900 mL / NET: -1900 mL    23 @ 07:01  -  23 @ 12:11  --------------------------------------------------------  IN: 0 mL / OUT: 250 mL / NET: -250 mL        PHYSICAL EXAM:  Constitutional: NAD  HEENT: PERRL, EOMI, sclera non-icteric, neck supple, trachea midline, no masses, no JVD, MMM  Respiratory: CTA b/l, good air entry b/l, no wheezing, no rhonchi, no rales, without accessory muscle use and no intercostal retractions  Cardiovascular: RRR, normal S1S2, no M/R/G  Gastrointestinal: soft, NTND, no masses palpable, BS normal  Extremities: Warm, well perfused, pulses equal bilateral upper and lower extremities, no edema, no clubbing  Neurological: AAOx3, CN Grossly intact  Skin: Normal temperature, warm, dry    MEDICATIONS  (STANDING):  cefTRIAXone   IVPB 2000 milliGRAM(s) IV Intermittent every 24 hours  chlorhexidine 4% Liquid 1 Application(s) Topical <User Schedule>  dextrose 5%. 1000 milliLiter(s) (50 mL/Hr) IV Continuous <Continuous>  dextrose 5%. 1000 milliLiter(s) (100 mL/Hr) IV Continuous <Continuous>  dextrose 50% Injectable 25 Gram(s) IV Push once  dextrose 50% Injectable 12.5 Gram(s) IV Push once  dextrose 50% Injectable 25 Gram(s) IV Push once  glucagon  Injectable 1 milliGRAM(s) IntraMuscular once  insulin lispro (ADMELOG) corrective regimen sliding scale   SubCutaneous Before meals and at bedtime  levothyroxine 25 MICROGram(s) Oral daily  losartan 25 milliGRAM(s) Oral every 24 hours    MEDICATIONS  (PRN):  dextrose Oral Gel 15 Gram(s) Oral once PRN Blood Glucose LESS THAN 70 milliGRAM(s)/deciliter      Allergies    No Known Allergies    Intolerances        LABS:                        12.3   9.50  )-----------( 354      ( 2023 06:27 )             37.5     04-04    136  |  101  |  9   ----------------------------<  171<H>  4.1   |  23  |  0.54    Ca    8.5      2023 06:27  Phos  2.3     04-04  Mg     1.9     04-04    TPro  7.1  /  Alb  3.8  /  TBili  0.7  /  DBili  x   /  AST  34  /  ALT  38  /  AlkPhos  60  04-04    PT/INR - ( 2023 03:19 )   PT: 13.7 sec;   INR: 1.15          PTT - ( 2023 03:19 )  PTT:31.2 sec  Urinalysis Basic - ( 2023 23:00 )    Color: Yellow / Appearance: Clear / S.025 / pH: x  Gluc: x / Ketone: NEGATIVE  / Bili: Negative / Urobili: 0.2 E.U./dL   Blood: x / Protein: NEGATIVE mg/dL / Nitrite: NEGATIVE   Leuk Esterase: NEGATIVE / RBC: x / WBC x   Sq Epi: x / Non Sq Epi: x / Bacteria: x          RADIOLOGY & ADDITIONAL TESTS:  Reviewed INTERVAL HPI/OVERNIGHT EVENTS:  Patient was seen and examined at bedside. As per night team, pt refused losartan, patient resting comfortably. No complaints currently. Patient denies: fever, chills, dizziness, weakness, HA, changes in vision, CP, palpitations, SOB, cough, N/V, dysuria, changes in bowel movements, LE edema.     VITAL SIGNS:  T(F): 98.5 (23 @ 10:00)  HR: 68 (23 @ 11:00)  BP: 148/72 (23 @ 11:00)  RR: 28 (23 @ 11:00)  SpO2: 95% (23 @ 11:00)  Wt(kg): --      23 @ 07:01  -  23 @ 07:00  --------------------------------------------------------  IN: 0 mL / OUT: 1900 mL / NET: -1900 mL    23 @ 07:01  -  23 @ 12:11  --------------------------------------------------------  IN: 0 mL / OUT: 250 mL / NET: -250 mL        PHYSICAL EXAM:  Constitutional: NAD  HEENT: PERRL, EOMI, sclera non-icteric, neck supple, trachea midline, no masses, no JVD, MMM  Respiratory: CTA b/l, good air entry b/l, no wheezing, no rhonchi, no rales, without accessory muscle use and no intercostal retractions  Cardiovascular: RRR, varying S1 intensity, no M/R/G  Gastrointestinal: soft, NTND, no masses palpable, BS normal  Extremities: Warm, well perfused, pulses equal bilateral upper and lower extremities, no edema, no clubbing  Neurological: AAOx3, CN Grossly intact  Skin: Normal temperature, warm, dry    MEDICATIONS  (STANDING):  cefTRIAXone   IVPB 2000 milliGRAM(s) IV Intermittent every 24 hours  chlorhexidine 4% Liquid 1 Application(s) Topical <User Schedule>  dextrose 5%. 1000 milliLiter(s) (50 mL/Hr) IV Continuous <Continuous>  dextrose 5%. 1000 milliLiter(s) (100 mL/Hr) IV Continuous <Continuous>  dextrose 50% Injectable 25 Gram(s) IV Push once  dextrose 50% Injectable 12.5 Gram(s) IV Push once  dextrose 50% Injectable 25 Gram(s) IV Push once  glucagon  Injectable 1 milliGRAM(s) IntraMuscular once  insulin lispro (ADMELOG) corrective regimen sliding scale   SubCutaneous Before meals and at bedtime  levothyroxine 25 MICROGram(s) Oral daily  losartan 25 milliGRAM(s) Oral every 24 hours    MEDICATIONS  (PRN):  dextrose Oral Gel 15 Gram(s) Oral once PRN Blood Glucose LESS THAN 70 milliGRAM(s)/deciliter      Allergies    No Known Allergies    Intolerances        LABS:                        12.3   9.50  )-----------( 354      ( 2023 06:27 )             37.5     04-04    136  |  101  |  9   ----------------------------<  171<H>  4.1   |  23  |  0.54    Ca    8.5      2023 06:27  Phos  2.3     04-04  Mg     1.9     04-04    TPro  7.1  /  Alb  3.8  /  TBili  0.7  /  DBili  x   /  AST  34  /  ALT  38  /  AlkPhos  60  04-04    PT/INR - ( 2023 03:19 )   PT: 13.7 sec;   INR: 1.15          PTT - ( 2023 03:19 )  PTT:31.2 sec  Urinalysis Basic - ( 2023 23:00 )    Color: Yellow / Appearance: Clear / S.025 / pH: x  Gluc: x / Ketone: NEGATIVE  / Bili: Negative / Urobili: 0.2 E.U./dL   Blood: x / Protein: NEGATIVE mg/dL / Nitrite: NEGATIVE   Leuk Esterase: NEGATIVE / RBC: x / WBC x   Sq Epi: x / Non Sq Epi: x / Bacteria: x          RADIOLOGY & ADDITIONAL TESTS:  Reviewed

## 2023-04-04 NOTE — DISCHARGE NOTE PROVIDER - CARE PROVIDER_API CALL
Fernando Lopez)  Internal Medicine  [address]  [place], [place] [address]  Phone: [phone]  Fax: [phone]  Follow Up Time: 1 month

## 2023-04-04 NOTE — PROGRESS NOTE ADULT - ASSESSMENT
Unclear if the patient has Lyme carditis.  Lyme testing (Western blot and IgM/IgG) were negative.  However these could be false negative in setting of acute infection.  Can check these again to look for seroconversion. If they are negative now and she did not respond to Doxycycline, I would question the diagnosis. If the serologies are now positive it would raise suspicion for Lyme Carditis and although patient states she was compliant with doxycycline.     Recommend:  1.  Pt would like to be discharged 4/4 prior to holiday; ok to c/w Ceftriaxone 2 grams IV daily via midline until 4/7  2.  F/u Lyme VISE with reflex IgG/IgM  3.  Send repeat lyme western blot     ID team 1 will follow  Unclear if the patient has Lyme carditis.  Lyme testing (Western blot and IgM/IgG) were negative.  However these could be false negative in setting of acute infection.  Can check these again to look for seroconversion. If they are negative now and she did not respond to Doxycycline, I would question the diagnosis. If the serologies are now positive it would raise suspicion for Lyme Carditis and although patient states she was compliant with doxycycline.     Recommend:  1.  Pt would like to be discharged 4/4 prior to holiday; ok to c/w Ceftriaxone 2 grams IV daily x 14 days (4/3-4/16)  2.  F/u Lyme VISE with reflex IgG/IgM  3.  Send repeat lyme western blot     ID team 1 will follow

## 2023-04-04 NOTE — PROGRESS NOTE ADULT - SUBJECTIVE AND OBJECTIVE BOX
EPS Device interrogation    Indication: post implant    Device model: MDT Micra 		Implanting Physician: Dr. iRvers     Functioning Mode: VDD 50 -110 			    Underlying Rhythm: AS/    Pacemaker dependency:  No    Battery status: 3.18 V,  9 years   Interrogating parameters:   				RA			RV			LV    Sense:                                                                               11.3  mV    Threshold:                                                                       0.25 V@ 0.24 ms    Pacing Impedance:                                                                     700  om    Shock Impedance:                                                                      n/a     Events/Alert:  none    Parameter change: 	none    Normal function Micra PPM   suture removed, no bleeding, no swelling, no hematoma   Follow up Dr. Rivers 5/9/23 @ 3;20 pm , [phone]  [ ]EPS attending: Interrogation reviewed. Agree with above.

## 2023-04-04 NOTE — PROGRESS NOTE ADULT - ATTENDING COMMENTS
Agree with above. Patient claims she was compliant with doxycycline however heart block has progressed suggesting an alternate etiology of heart block.  Repeat Lyme serologies are pending and therefore I can not definitively say she doesn't have Lyme Carditis.  She is insistent on leaving today.  Therefore recommend she take Ceftriaxone 2 grams IV daily x 14 days
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 previously started on Losartan 24 and Farxiga 10 daily on last admissiont but was not taking either. Will restart both and add Toprol 25 post PPM placement. Counseled pt. on medication compliance  -DM - Recently dx'd uncontrolled DM on last admission. A1C 9.8%; Pt. currently declining all diabetes medication. Discussed risk of poorly controlled DM w/ patient  -NPO for PPM; DASH diet post procedure  -DVT PPx  -Full Code  -Dispo: CCU    Giovanni De León MD  CCU Attending

## 2023-04-04 NOTE — DISCHARGE NOTE NURSING/CASE MANAGEMENT/SOCIAL WORK - PATIENT PORTAL LINK FT
You can access the FollowMyHealth Patient Portal offered by [facility] by registering at the following website: [email or web address]. By joining [facility]’s FollowMyHealth portal, you will also be able to view your health information using other applications (apps) compatible with our system.

## 2023-04-04 NOTE — DISCHARGE NOTE PROVIDER - NSDCFUADDINST_GEN_ALL_CORE_FT
Follow these instructions at home:    Medicines    Take over-the-counter and prescription medicines only as told by your health care provider.  If you were prescribed an antibiotic medicine, take it as told by your health care provider. Do not stop taking the antibiotic even if you start to feel better.  Ask your health care provider if the medicine prescribed to you requires you to avoid driving or using machinery.  Incision site care      Do not remove the bandage (dressing) on your chest until you are told to do so by your health care provider.  After your dressing is removed, you may see pieces of tape called skin adhesive strips over the incision site. Let the strips fall off on their own.  Check your incision area every day for signs of infection. Check for:  More redness, swelling, or pain.  Fluid or blood.  Warmth.  Pus or a bad smell.  Do not use lotions or ointments near the incision site unless you are told to do so.  Keep the incision area clean and dry for 2–3 days after the procedure or as told by your health care provider. It takes several weeks for the incision site to completely heal.  Women may want to place a small pad over the incision site to protect it from their bra strap.  Do not take baths, swim, or use a hot tub for 7–10 days or until your health care provider approves. Ask your health care provider if you may take showers. You may only be allowed to take sponge baths.  Activity      If you were given a medicine to help you relax (sedative) during the procedure, it can affect you for several hours. Do not drive or operate machinery until your health care provider says that it is safe.  Return to your normal activities as told by your health care provider. Ask your health care provider what activities are safe for you.  Do not lift anything that is heavier than 10 lb (4.5 kg), or the limit that you are told, until your health care provider says that it is safe.  Avoid sudden jerking, pulling, or chopping movements that pull your upper arm far away from your body. Avoid these movements for at least 6 weeks or as long as told by your health care provider.  Do not lift your upper arm above your shoulders for at least 6 weeks or as long as told by your health care provider. This includes activities like tennis, golf, or swimming.  You may go back to work when your health care provider says it is okay.  Electricity and magnetic fields    Avoid places or objects that have a strong electric or magnetic field, including:  Airport security checkpoints. When at the airport, let officials know that you have a pacemaker. Carry your pacemaker ID card.  Metal detectors. If you must pass through a metal detector, walk through it quickly. Do not stop under the detector or stand near it.  Power plants.  Large electrical generators.  Radiofrequency transmission towers, such as mobile phone and radio towers.  Do not use amateur radio equipment or electric welding torches. If you are unsure of whether something is safe to use, ask your health care provider. Some devices may be safe to use if you hold them at least 1 ft (0.3 m) from your pacemaker. These devices may include power tools, lawn mowers, and speakers.  When using your mobile phone, hold it to the ear opposite the pacemaker. Do not leave your mobile phone in a pocket over the pacemaker.  Long-term care    You may be shown how to transfer data from your pacemaker through the phone to your health care provider.  Always let all health care providers, including dentists, know about your pacemaker before you have any medical procedures or tests.  Wear a medical ID bracelet or necklace stating that you have a pacemaker.  Carry a pacemaker ID card with you at all times.  Your pacemaker battery will last for 5–15 years. Your health care provider will do routine checks to know when the battery is starting to run down. When this happens, the pacemaker will need to be replaced.  General instructions    Do not use any products that contain nicotine or tobacco, such as cigarettes, e-cigarettes, and chewing tobacco. These can delay incision healing after surgery. If you need help quitting, ask your health care provider.  Follow instructions from your health care provider about eating or drinking restrictions.  Weigh yourself every day. If you suddenly gain weight, fluid may be building up in your body.  Keep all follow-up visits as told by your health care provider. This is important. During follow-up visits, your pacemaker will be checked and reprogrammed if necessary.  Contact a health care provider if:  You gain weight suddenly.  Your legs or feet swell.  It feels like your heart is fluttering or skipping beats (you have heart palpitations).  You have any of these signs of infection:  More redness, swelling, or pain around an incision.  Fluid or blood coming from an incision.  Warmth coming from an incision.  Pus or a bad smell coming from an incision.  A fever or chills.  Get help right away if:  You have chest pain.  You have trouble breathing or are short of breath.  You become extremely tired.  You are light-headed or you faint.

## 2023-04-04 NOTE — DISCHARGE NOTE PROVIDER - NSDCFUSCHEDAPPT_GEN_ALL_CORE_FT
Nuno Rushing  [facility] Physician [facility]  [facility] [address]  Scheduled Appointment: 04/18/2023    Roosveelt Rivers  [facility]  [facility] [address]  Scheduled Appointment: 05/09/2023

## 2023-04-04 NOTE — DISCHARGE NOTE PROVIDER - HOSPITAL COURSE
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 w/ outpatient EKG showing complete heart block with alternating RBBB and LBBB, admitted for PPM placement.     Problem List/Main Diagnoses (system-based):     #3rd Degree AV Block s/p MICRA PPM  #Chronic HFrEF  #Acute myocarditis  Symptomatic complete  AV block w/ alternating RBBB and LBBB and CHB likely 2/2 myocarditis presumed to be Lyme disease due to Lyme titers 3/17: (+) Lyme C6 ab, however, Western Blot negative on 3/18.   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 Pt refused cardiac biopsy to differentiate between lyme carditis and lymphocytic myocarditis on last admission.  Discharged on Doxycycline 100 mg BID x3 weeks (through 4/7/23) and Levothyroxine 25 mcg daily. Per patient, was not taking losartan, farxiga, or synthroid at home. ID consulted - treated w/ ceftriaxone 2g QD as IP.    - c/w CTX for 2 weeks (first day 4/3/23)  - MICRA PPM placed under EP 4/4/23    #DM  A1c 9.8, not on any home meds, refusing inpatient sliding scale.  - f/u as OP    #Hypothyroidism  TSH 7.15 on last admission, pt has not been compliant with home synthroid. Refusing synthroid inpatient  - repeat TSH 12  - cont home synthroid 25mcg QD    #Lyme Disease  Lyme C6 ab (+), Western Blot negative on 3/18. ID consulted and recommending treatment w/ ceftriaxone IV as OP.  - c/w CTX 2g QD for a total of 14 days as per ID (start 4/3/23)  - sent repeat lyme Abs w/ reflex western blot    Patient was discharged to: Home  New medications:   IV ceftriaxone 2g QD for 14 days  Jardiance 10mg QD  Changes to old medications: None  Medications that were stopped: None  Items to Follow up as outpatient   PCP f/u  cardiology f/u

## 2023-04-05 RX ORDER — CEFTRIAXONE 500 MG/1
2 INJECTION, POWDER, FOR SOLUTION INTRAMUSCULAR; INTRAVENOUS
Qty: 0 | Refills: 0 | DISCHARGE
Start: 2023-04-05 | End: 2023-04-17

## 2023-04-05 RX ORDER — EMPAGLIFLOZIN 10 MG/1
1 TABLET, FILM COATED ORAL
Qty: 30 | Refills: 0
Start: 2023-04-05 | End: 2023-05-04

## 2023-04-13 DIAGNOSIS — E11.65 TYPE 2 DIABETES MELLITUS WITH HYPERGLYCEMIA: ICD-10-CM

## 2023-04-13 DIAGNOSIS — I44.2 ATRIOVENTRICULAR BLOCK, COMPLETE: ICD-10-CM

## 2023-04-13 DIAGNOSIS — I50.22 CHRONIC SYSTOLIC (CONGESTIVE) HEART FAILURE: ICD-10-CM

## 2023-04-13 DIAGNOSIS — I45.2 BIFASCICULAR BLOCK: ICD-10-CM

## 2023-04-13 DIAGNOSIS — A69.20 LYME DISEASE, UNSPECIFIED: ICD-10-CM

## 2023-04-13 DIAGNOSIS — B96.89 OTHER SPECIFIED BACTERIAL AGENTS AS THE CAUSE OF DISEASES CLASSIFIED ELSEWHERE: ICD-10-CM

## 2023-04-13 DIAGNOSIS — I40.0 INFECTIVE MYOCARDITIS: ICD-10-CM

## 2023-04-13 DIAGNOSIS — E03.9 HYPOTHYROIDISM, UNSPECIFIED: ICD-10-CM

## 2023-04-13 DIAGNOSIS — E66.9 OBESITY, UNSPECIFIED: ICD-10-CM

## 2023-04-17 ENCOUNTER — NON-APPOINTMENT (OUTPATIENT)
Age: 48
End: 2023-04-17

## 2023-04-17 ENCOUNTER — APPOINTMENT (OUTPATIENT)
Dept: HEART AND VASCULAR | Facility: CLINIC | Age: 48
End: 2023-04-17
Payer: COMMERCIAL

## 2023-04-17 VITALS
HEIGHT: 62 IN | DIASTOLIC BLOOD PRESSURE: 78 MMHG | TEMPERATURE: 98.4 F | HEART RATE: 80 BPM | WEIGHT: 262.99 LBS | OXYGEN SATURATION: 98 % | BODY MASS INDEX: 48.4 KG/M2 | SYSTOLIC BLOOD PRESSURE: 138 MMHG

## 2023-04-17 DIAGNOSIS — I50.9 HEART FAILURE, UNSPECIFIED: ICD-10-CM

## 2023-04-17 DIAGNOSIS — A69.29 OTHER CONDITIONS ASSOCIATED WITH LYME DISEASE: ICD-10-CM

## 2023-04-17 DIAGNOSIS — E11.9 TYPE 2 DIABETES MELLITUS W/OUT COMPLICATIONS: ICD-10-CM

## 2023-04-17 PROCEDURE — 99024 POSTOP FOLLOW-UP VISIT: CPT

## 2023-04-17 PROCEDURE — 99214 OFFICE O/P EST MOD 30 MIN: CPT

## 2023-04-17 NOTE — PHYSICAL EXAM
[Normal] : alert and oriented, normal memory [de-identified] : heavyset [de-identified] : intertrigo R keishain

## 2023-04-17 NOTE — ASSESSMENT
[FreeTextEntry1] : EKG " V paced rare PVCs\par \par A/P\par Myocarditis\par CHF, systolic, chronic\par hx complete heart block\par s/p PPM\par Hx as above\par Fatigue '95% improved' since PPM. She continue to decline GDMT CHF meds under theroy that supplements can improve EF. LVEF was 35-40% last hospital stay: will repeat 3 mos (after daughter's wedding)\par In terms of etiology, as per HPI, EF as noted w heart block, possibility of lYme carditis, although a second Lyme titer sent at hospital discharge in April also was Western blot neg. The possibility of lymphocytic myocarditis was also considered in hospital: pt ws advised myocardial biopsy prior to consideration of course of steroids but refused. At this point, she is improved -  would continue current rx (will complete ceftriaxone tomorrow)\par \par Diabetes Mellitus, Type 2, without insulin, without complications\par Pt will start Jardiance\par \par Intertrigo R groin\par PM site intact will f/u w PMD re topical anti fungal \par \par

## 2023-04-17 NOTE — HISTORY OF PRESENT ILLNESS
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 w/ outpatient EKG showing complete heart block with alternating RBBB and LBBB, admitted for PPM placement. \par \par Problem List/Main Diagnoses (system-based): \par \par #3rd Degree AV Block s/p MICRA PPM\par #Chronic HFrEF\par #Acute myocarditis\par Symptomatic complete  AV block w/ alternating RBBB and LBBB and CHB likely 2/2 myocarditis presumed to be Lyme disease due to Lyme titers 3/17: (+) Lyme C6 ab, however, Western Blot negative on 3/18. 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 Pt refused cardiac biopsy to differentiate between lyme carditis and lymphocytic myocarditis on last admission.\par Lyme C6 ab (+), Western Blot negative on 3/18. ID consulted and recommending treatment w/ ceftriaxone IV as OP.\par - c/w CTX 2g QD for a total of 14 days as per ID (start 4/3/23)\par - sent repeat lyme Abs w/ reflex western blot\par \par Discharged on Doxycycline 100 mg BID x3 weeks (through 4/7/23) and Levothyroxine 25 mcg daily. Per patient, was not taking losartan, farxiga, or synthroid at home. ID consulted - treated w/ ceftriaxone 2g QD as IP.\par \par - c/w Ceftriaxone for 2 weeks (first day 4/3/23)\par - MICRA PM placed under EP 4/4/23\par \par Since d/c much improved. No further debiilitating fatigue. No recurrence of LE edema\par \par Other medica; issues\par DM\par A1c 9.8, Jardiance was prescrbed at hospital discharge, just received insurance approval, will start today \par \par Lyme disease - question as above\par Lyme C6 ab (+), Western Blot negative on 3/18. ID consulted and recommending treatment w/ ceftriaxone IV as OP.\par - c/w CTX 2g QD for a total of 14 days as per ID (start 4/3/23)\par - sent repeat lyme Abs w/ reflex western blot\par

## 2023-05-09 ENCOUNTER — APPOINTMENT (OUTPATIENT)
Dept: HEART AND VASCULAR | Facility: CLINIC | Age: 48
End: 2023-05-09

## 2023-07-31 ENCOUNTER — APPOINTMENT (OUTPATIENT)
Dept: HEART AND VASCULAR | Facility: CLINIC | Age: 48
End: 2023-07-31

## 2023-08-08 ENCOUNTER — APPOINTMENT (OUTPATIENT)
Dept: HEART AND VASCULAR | Facility: CLINIC | Age: 48
End: 2023-08-08
Payer: SELF-PAY

## 2023-08-08 ENCOUNTER — NON-APPOINTMENT (OUTPATIENT)
Age: 48
End: 2023-08-08

## 2023-08-08 PROCEDURE — 93296 REM INTERROG EVL PM/IDS: CPT

## 2023-08-08 PROCEDURE — 93294 REM INTERROG EVL PM/LDLS PM: CPT

## 2023-08-16 ENCOUNTER — APPOINTMENT (OUTPATIENT)
Dept: HEART AND VASCULAR | Facility: CLINIC | Age: 48
End: 2023-08-16
Payer: COMMERCIAL

## 2023-08-16 ENCOUNTER — NON-APPOINTMENT (OUTPATIENT)
Age: 48
End: 2023-08-16

## 2023-08-16 VITALS
OXYGEN SATURATION: 99 % | TEMPERATURE: 98.4 F | SYSTOLIC BLOOD PRESSURE: 125 MMHG | DIASTOLIC BLOOD PRESSURE: 70 MMHG | WEIGHT: 241 LBS | HEART RATE: 72 BPM | BODY MASS INDEX: 44.35 KG/M2 | HEIGHT: 62 IN

## 2023-08-16 DIAGNOSIS — I44.30 UNSPECIFIED ATRIOVENTRICULAR BLOCK: ICD-10-CM

## 2023-08-16 DIAGNOSIS — E11.49 TYPE 2 DIABETES MELLITUS WITH OTHER DIABETIC NEUROLOGICAL COMPLICATION: ICD-10-CM

## 2023-08-16 DIAGNOSIS — I44.7 LEFT BUNDLE-BRANCH BLOCK, UNSPECIFIED: ICD-10-CM

## 2023-08-16 DIAGNOSIS — Z95.0 PRESENCE OF CARDIAC PACEMAKER: ICD-10-CM

## 2023-08-16 DIAGNOSIS — R94.31 ABNORMAL ELECTROCARDIOGRAM [ECG] [EKG]: ICD-10-CM

## 2023-08-16 PROCEDURE — ZZZZZ: CPT

## 2023-08-16 PROCEDURE — XXXXX: CPT | Mod: 1L

## 2023-08-16 PROCEDURE — 99214 OFFICE O/P EST MOD 30 MIN: CPT | Mod: 25

## 2023-08-16 PROCEDURE — 93000 ELECTROCARDIOGRAM COMPLETE: CPT

## 2023-10-10 PROBLEM — R94.31 ABNORMAL EKG: Status: ACTIVE | Noted: 2023-03-16

## 2023-10-10 PROBLEM — I44.30 AV BLOCK: Status: ACTIVE | Noted: 2023-03-16

## 2023-10-10 PROBLEM — E11.49 TYPE 2 DIABETES MELLITUS WITH OTHER NEUROLOGIC COMPLICATION, WITHOUT LONG-TERM CURRENT USE OF INSULIN: Status: ACTIVE | Noted: 2023-04-17

## 2023-10-10 PROBLEM — Z95.0 PACEMAKER: Status: ACTIVE | Noted: 2023-04-17

## 2023-10-10 PROBLEM — I44.7 LBBB (LEFT BUNDLE BRANCH BLOCK): Status: ACTIVE | Noted: 2023-03-16

## 2023-11-07 ENCOUNTER — NON-APPOINTMENT (OUTPATIENT)
Age: 48
End: 2023-11-07

## 2023-11-07 ENCOUNTER — APPOINTMENT (OUTPATIENT)
Dept: HEART AND VASCULAR | Facility: CLINIC | Age: 48
End: 2023-11-07
Payer: COMMERCIAL

## 2023-11-07 PROCEDURE — 93294 REM INTERROG EVL PM/LDLS PM: CPT

## 2023-11-07 PROCEDURE — 93296 REM INTERROG EVL PM/IDS: CPT

## 2024-02-08 ENCOUNTER — APPOINTMENT (OUTPATIENT)
Dept: HEART AND VASCULAR | Facility: CLINIC | Age: 49
End: 2024-02-08
Payer: COMMERCIAL

## 2024-02-08 ENCOUNTER — NON-APPOINTMENT (OUTPATIENT)
Age: 49
End: 2024-02-08

## 2024-02-08 PROCEDURE — 93294 REM INTERROG EVL PM/LDLS PM: CPT

## 2024-02-08 PROCEDURE — 93296 REM INTERROG EVL PM/IDS: CPT

## 2024-02-27 NOTE — PROGRESS NOTE ADULT - PROBLEM/PLAN-2
DISPLAY PLAN FREE TEXT
The patient is a 39y Female complaining of chest pain.

## 2024-05-09 ENCOUNTER — APPOINTMENT (OUTPATIENT)
Dept: HEART AND VASCULAR | Facility: CLINIC | Age: 49
End: 2024-05-09

## 2024-06-13 ENCOUNTER — APPOINTMENT (OUTPATIENT)
Dept: HEART AND VASCULAR | Facility: CLINIC | Age: 49
End: 2024-06-13

## 2024-07-05 ENCOUNTER — APPOINTMENT (OUTPATIENT)
Dept: HEART AND VASCULAR | Facility: CLINIC | Age: 49
End: 2024-07-05

## 2024-07-05 PROCEDURE — 93294 REM INTERROG EVL PM/LDLS PM: CPT

## 2024-07-05 PROCEDURE — 93296 REM INTERROG EVL PM/IDS: CPT

## 2024-10-04 ENCOUNTER — NON-APPOINTMENT (OUTPATIENT)
Age: 49
End: 2024-10-04

## 2024-10-04 ENCOUNTER — APPOINTMENT (OUTPATIENT)
Dept: HEART AND VASCULAR | Facility: CLINIC | Age: 49
End: 2024-10-04
Payer: COMMERCIAL

## 2024-10-04 PROCEDURE — 93296 REM INTERROG EVL PM/IDS: CPT

## 2024-10-04 PROCEDURE — 93294 REM INTERROG EVL PM/LDLS PM: CPT

## 2025-01-03 ENCOUNTER — APPOINTMENT (OUTPATIENT)
Dept: HEART AND VASCULAR | Facility: CLINIC | Age: 50
End: 2025-01-03
Payer: COMMERCIAL

## 2025-01-03 ENCOUNTER — NON-APPOINTMENT (OUTPATIENT)
Age: 50
End: 2025-01-03

## 2025-01-03 PROCEDURE — 93294 REM INTERROG EVL PM/LDLS PM: CPT

## 2025-01-03 PROCEDURE — 93296 REM INTERROG EVL PM/IDS: CPT

## 2025-04-04 ENCOUNTER — APPOINTMENT (OUTPATIENT)
Dept: HEART AND VASCULAR | Facility: CLINIC | Age: 50
End: 2025-04-04
Payer: COMMERCIAL

## 2025-04-04 ENCOUNTER — NON-APPOINTMENT (OUTPATIENT)
Age: 50
End: 2025-04-04

## 2025-04-04 PROCEDURE — 93294 REM INTERROG EVL PM/LDLS PM: CPT

## 2025-04-04 PROCEDURE — 93296 REM INTERROG EVL PM/IDS: CPT

## 2025-05-06 ENCOUNTER — TRANSCRIPTION ENCOUNTER (OUTPATIENT)
Age: 50
End: 2025-05-06

## 2025-05-26 ENCOUNTER — NON-APPOINTMENT (OUTPATIENT)
Age: 50
End: 2025-05-26

## 2025-05-27 ENCOUNTER — NON-APPOINTMENT (OUTPATIENT)
Age: 50
End: 2025-05-27

## 2025-05-27 ENCOUNTER — APPOINTMENT (OUTPATIENT)
Dept: HEART AND VASCULAR | Facility: CLINIC | Age: 50
End: 2025-05-27

## 2025-05-27 VITALS
OXYGEN SATURATION: 99 % | BODY MASS INDEX: 38.46 KG/M2 | WEIGHT: 209 LBS | TEMPERATURE: 98 F | HEART RATE: 76 BPM | HEIGHT: 62 IN | DIASTOLIC BLOOD PRESSURE: 70 MMHG | SYSTOLIC BLOOD PRESSURE: 120 MMHG

## 2025-05-27 DIAGNOSIS — Z83.3 FAMILY HISTORY OF DIABETES MELLITUS: ICD-10-CM

## 2025-05-27 DIAGNOSIS — R01.1 CARDIAC MURMUR, UNSPECIFIED: ICD-10-CM

## 2025-05-27 PROCEDURE — 93000 ELECTROCARDIOGRAM COMPLETE: CPT

## 2025-05-27 PROCEDURE — 99214 OFFICE O/P EST MOD 30 MIN: CPT

## 2025-05-27 PROCEDURE — G2211 COMPLEX E/M VISIT ADD ON: CPT

## 2025-07-05 NOTE — ED ADULT NURSE NOTE - OBJECTIVE STATEMENT
Patient arrived to floor from ICU, diagnosis of ankle fracture. Oriented to room and call light.  Pain assessed. Care plan discussed and patient involved in plan of care.   Pt sent by MD for heart block on EKG. Pt c/o generalized weakness. Denies palpitations. Endorses mid sternal chest pressure.

## 2025-07-08 ENCOUNTER — APPOINTMENT (OUTPATIENT)
Dept: HEART AND VASCULAR | Facility: CLINIC | Age: 50
End: 2025-07-08

## 2025-08-08 ENCOUNTER — APPOINTMENT (OUTPATIENT)
Dept: HEART AND VASCULAR | Facility: CLINIC | Age: 50
End: 2025-08-08
Payer: COMMERCIAL

## 2025-08-08 VITALS
OXYGEN SATURATION: 99 % | BODY MASS INDEX: 39.75 KG/M2 | WEIGHT: 216 LBS | SYSTOLIC BLOOD PRESSURE: 118 MMHG | TEMPERATURE: 97.7 F | HEART RATE: 92 BPM | HEIGHT: 62 IN | DIASTOLIC BLOOD PRESSURE: 82 MMHG

## 2025-08-08 DIAGNOSIS — R94.31 ABNORMAL ELECTROCARDIOGRAM [ECG] [EKG]: ICD-10-CM

## 2025-08-08 DIAGNOSIS — I50.9 HEART FAILURE, UNSPECIFIED: ICD-10-CM

## 2025-08-08 DIAGNOSIS — E11.49 TYPE 2 DIABETES MELLITUS WITH OTHER DIABETIC NEUROLOGICAL COMPLICATION: ICD-10-CM

## 2025-08-08 DIAGNOSIS — R01.1 CARDIAC MURMUR, UNSPECIFIED: ICD-10-CM

## 2025-08-08 DIAGNOSIS — Z95.0 PRESENCE OF CARDIAC PACEMAKER: ICD-10-CM

## 2025-08-08 DIAGNOSIS — I50.21 ACUTE SYSTOLIC (CONGESTIVE) HEART FAILURE: ICD-10-CM

## 2025-08-08 DIAGNOSIS — I44.7 LEFT BUNDLE-BRANCH BLOCK, UNSPECIFIED: ICD-10-CM

## 2025-08-08 PROCEDURE — 99214 OFFICE O/P EST MOD 30 MIN: CPT

## 2025-08-08 PROCEDURE — G2211 COMPLEX E/M VISIT ADD ON: CPT

## 2025-08-13 LAB
ALBUMIN SERPL ELPH-MCNC: 4.7 G/DL
ALP BLD-CCNC: 71 U/L
ALT SERPL-CCNC: 20 U/L
ANION GAP SERPL CALC-SCNC: 15 MMOL/L
AST SERPL-CCNC: 18 U/L
BILIRUB SERPL-MCNC: 0.5 MG/DL
BUN SERPL-MCNC: 15 MG/DL
CALCIUM SERPL-MCNC: 9.8 MG/DL
CHLORIDE SERPL-SCNC: 103 MMOL/L
CHOLEST SERPL-MCNC: 193 MG/DL
CO2 SERPL-SCNC: 23 MMOL/L
CREAT SERPL-MCNC: 0.54 MG/DL
CRP SERPL HS-MCNC: 11.49 MG/L
EGFRCR SERPLBLD CKD-EPI 2021: 112 ML/MIN/1.73M2
ESTIMATED AVERAGE GLUCOSE: 128 MG/DL
GLUCOSE SERPL-MCNC: 137 MG/DL
HBA1C MFR BLD HPLC: 6.1 %
HDL-C: 50 MG/DL
HDL-P (TOTAL): 29.6 UMOL/L
HDLC SERPL-MCNC: 51 MG/DL
LDL SIZE: 20 NM
LDL-C: 125 MG/DL
LDL-P: 1924 NMOL/L
LDLC SERPL-MCNC: 127 MG/DL
LP-IR SCORE: 42
NMR CHOLESTEROL, TOTAL: 191 MG/DL
NONHDLC SERPL-MCNC: 142 MG/DL
POTASSIUM SERPL-SCNC: 5 MMOL/L
PROT SERPL-MCNC: 7.9 G/DL
SMALL LDL-P: 1404 NMOL/L
SODIUM SERPL-SCNC: 140 MMOL/L
TRIGL SERPL-MCNC: 80 MG/DL
TRIGL SERPL-MCNC: 85 MG/DL
TSH SERPL-ACNC: 0.44 UIU/ML

## 2025-08-14 ENCOUNTER — APPOINTMENT (OUTPATIENT)
Dept: HEART AND VASCULAR | Facility: CLINIC | Age: 50
End: 2025-08-14

## 2025-08-14 ENCOUNTER — NON-APPOINTMENT (OUTPATIENT)
Age: 50
End: 2025-08-14

## 2025-08-14 ENCOUNTER — APPOINTMENT (OUTPATIENT)
Dept: HEART AND VASCULAR | Facility: CLINIC | Age: 50
End: 2025-08-14
Payer: COMMERCIAL

## 2025-08-14 LAB — APO LP(A) SERPL-MCNC: 44.5 NMOL/L

## 2025-08-14 PROCEDURE — 93294 REM INTERROG EVL PM/LDLS PM: CPT

## 2025-08-14 PROCEDURE — G2211 COMPLEX E/M VISIT ADD ON: CPT | Mod: 93

## 2025-08-14 PROCEDURE — 93296 REM INTERROG EVL PM/IDS: CPT

## 2025-08-14 PROCEDURE — 99213 OFFICE O/P EST LOW 20 MIN: CPT | Mod: 93
